# Patient Record
Sex: FEMALE | Race: WHITE | NOT HISPANIC OR LATINO | Employment: FULL TIME | URBAN - METROPOLITAN AREA
[De-identification: names, ages, dates, MRNs, and addresses within clinical notes are randomized per-mention and may not be internally consistent; named-entity substitution may affect disease eponyms.]

---

## 2023-04-15 DIAGNOSIS — Z00.00 ENCOUNTER FOR GENERAL ADULT MEDICAL EXAMINATION WITHOUT ABNORMAL FINDINGS: ICD-10-CM

## 2023-04-16 RX ORDER — PROPRANOLOL HYDROCHLORIDE 80 MG/1
CAPSULE, EXTENDED RELEASE ORAL
Qty: 90 CAPSULE | Refills: 0 | Status: SHIPPED | OUTPATIENT
Start: 2023-04-16 | End: 2023-08-21

## 2023-06-20 ENCOUNTER — OFFICE VISIT (OUTPATIENT)
Dept: PRIMARY CARE | Facility: CLINIC | Age: 58
End: 2023-06-20
Payer: COMMERCIAL

## 2023-06-20 VITALS
WEIGHT: 202 LBS | DIASTOLIC BLOOD PRESSURE: 82 MMHG | BODY MASS INDEX: 35.79 KG/M2 | HEART RATE: 84 BPM | HEIGHT: 63 IN | SYSTOLIC BLOOD PRESSURE: 139 MMHG

## 2023-06-20 DIAGNOSIS — E04.2 MULTINODULAR GOITER: ICD-10-CM

## 2023-06-20 DIAGNOSIS — Z00.00 HEALTH CARE MAINTENANCE: Primary | ICD-10-CM

## 2023-06-20 DIAGNOSIS — H90.3 BILATERAL SENSORINEURAL HEARING LOSS: ICD-10-CM

## 2023-06-20 DIAGNOSIS — E05.90 HYPERTHYROIDISM: ICD-10-CM

## 2023-06-20 DIAGNOSIS — K21.9 CHRONIC GERD: ICD-10-CM

## 2023-06-20 DIAGNOSIS — I10 PRIMARY HYPERTENSION: ICD-10-CM

## 2023-06-20 PROBLEM — D64.9 ANEMIA: Status: ACTIVE | Noted: 2023-06-20

## 2023-06-20 PROBLEM — F43.20 ADULT SITUATIONAL STRESS DISORDER: Status: ACTIVE | Noted: 2023-06-20

## 2023-06-20 PROBLEM — K80.20 CHOLELITHIASIS: Status: ACTIVE | Noted: 2023-06-20

## 2023-06-20 PROCEDURE — 1036F TOBACCO NON-USER: CPT | Performed by: INTERNAL MEDICINE

## 2023-06-20 PROCEDURE — 99396 PREV VISIT EST AGE 40-64: CPT | Performed by: INTERNAL MEDICINE

## 2023-06-20 PROCEDURE — 3079F DIAST BP 80-89 MM HG: CPT | Performed by: INTERNAL MEDICINE

## 2023-06-20 PROCEDURE — 3075F SYST BP GE 130 - 139MM HG: CPT | Performed by: INTERNAL MEDICINE

## 2023-06-20 RX ORDER — MESALAMINE 1.2 G/1
4 TABLET, DELAYED RELEASE ORAL
COMMUNITY
Start: 2022-05-18 | End: 2024-02-26 | Stop reason: SDUPTHER

## 2023-06-20 RX ORDER — AMOXICILLIN 500 MG/1
2000 TABLET, FILM COATED ORAL DAILY
COMMUNITY
Start: 2022-04-04

## 2023-06-20 ASSESSMENT — PROMIS GLOBAL HEALTH SCALE
RATE_AVERAGE_FATIGUE: MILD
CARRYOUT_PHYSICAL_ACTIVITIES: COMPLETELY
RATE_GENERAL_HEALTH: GOOD
RATE_AVERAGE_PAIN: 3
RATE_QUALITY_OF_LIFE: VERY GOOD
RATE_PHYSICAL_HEALTH: GOOD
EMOTIONAL_PROBLEMS: SOMETIMES
RATE_SOCIAL_SATISFACTION: GOOD
RATE_MENTAL_HEALTH: VERY GOOD
CARRYOUT_SOCIAL_ACTIVITIES: VERY GOOD

## 2023-06-20 ASSESSMENT — PATIENT HEALTH QUESTIONNAIRE - PHQ9
2. FEELING DOWN, DEPRESSED OR HOPELESS: NOT AT ALL
SUM OF ALL RESPONSES TO PHQ9 QUESTIONS 1 AND 2: 0
1. LITTLE INTEREST OR PLEASURE IN DOING THINGS: NOT AT ALL

## 2023-06-20 NOTE — ASSESSMENT & PLAN NOTE
Check screening labs.  Mammogram ordered.  Check ultrasound of the thyroid.  Check TSH.  Encouraged regular exercise well-balanced diet.  Encouraged weight reduction.

## 2023-06-20 NOTE — PROGRESS NOTES
"Subjective   Patient ID: Chelita Quiroz is a 58 y.o. female who presents for Annual Exam.        HPI   Patient is a 58-year-old female with past medical history of chronic GERD hypothyroidism hypertension cholelithiasis and hearing loss who presents for wellness.  She has no particular complaints at this time.  She has seen ENT in the last year but states that she is doing well.  No need for hearing aids.    She did go to GI to discuss her abdominal complaints.  She had a right upper quadrant ultrasound showing cholelithiasis and fatty liver without evidence of cholecystitis.  Patient denies any right upper quadrant abdominal pain when eating fatty foods    She has hypothyroidism in setting of multinodular goiter.  Last ultrasound done a year and a half ago and she is due for repeat ultrasound in 6 months.  Due for TSH recheck.  No heat or cold intolerances.    Patient states that she is up-to-date on her well woman examination and is due for a mammogram later this year    Denies illicit substances or smoking        Review of Systems  Constitutional: No fever or chills, No Night Sweats  Eyes: No Blurry Vision or Eye sight problems  ENT: No Nasal Discharge, Hoarseness, sore throat  Cardiovascular: no chest pain, no palpitations and no syncope.   Respiratory: no cough, no shortness of breath during exertion and no shortness of breath at rest.   Gastrointestinal: no abdominal pain, no nausea and no vomiting.   : No vaginal discharge, burning with urination, no blood in urine or stools  Skin: No Skin rashes or Lesions  Neuro: No Headache, no dizziness or Numbness or tingling  Psych: No Anxiety, depression or sleeping problems  Heme: No Easy bleeding or brusing.     Objective   /82   Pulse 84   Ht 1.6 m (5' 3\")   Wt 91.6 kg (202 lb)   BMI 35.78 kg/m²     Physical Exam  Patient declined Chaperone  Constitutional: Alert and in no acute distress. Well developed, well nourished.   Head and Face: Head and face: " Normal.    Eyes: Normal external exam. Pupils were equal in size, round, reactive to light (PERRL) with normal accommodation and extraocular movements intact (EOMI).   Ears, Nose, Mouth, and Throat: External inspection of ears and nose: Normal.  Hearing: Normal.  Nasal mucosa, septum, and turbinates: Normal.  Lips, teeth, and gums: Normal.  Oropharynx: Normal.   Neck: No neck mass was observed. Supple. Thyroid not enlarged and there were no palpable thyroid nodules.   Cardiovascular: Heart rate and rhythm were normal, normal S1 and S2. Pedal pulses: Normal. No peripheral edema.   Pulmonary: No respiratory distress. Clear bilateral breath sounds.   Breast: Normal Appearance, No Masses or lumps palpated  Abdomen: Soft nontender; no abdominal mass palpated. Normal bowel sounds. No organomegaly.   : Deferred   Musculoskeletal: No joint swelling seen, normal movements of all extremities. Range of motion: Normal.  Muscle strength/tone: Normal.    Skin: Normal skin color and pigmentation, normal skin turgor, and no rash.   Neurologic: Deep tendon reflexes were 2+ and symmetric.   Psychiatric: Judgment and insight: Intact. Mood and affect: Normal.  Lymphatic: No cervical lymphadenopathy. Palpation of lymph nodes in axillae: Normal.  Palpation of lymph nodes in groin: Normal.    Lab Results   Component Value Date    WBC 7.9 09/23/2021    HGB 13.3 09/23/2021    HCT 42.0 09/23/2021     09/23/2021    CHOL 231 (H) 09/23/2021    TRIG 144 09/23/2021    HDL 49.2 09/23/2021    ALT 20 09/23/2021    AST 21 09/23/2021     09/23/2021    K 4.4 09/23/2021     09/23/2021    CREATININE 0.87 09/23/2021    BUN 14 09/23/2021    CO2 30 09/23/2021    TSH 0.70 09/23/2021    HGBA1C 5.4 02/08/2021       US right upper quadrant  Narrative: Interpreted By:  BAILEY HERRERA MD  MRN: [id number]  Patient Name: COLEEN ZELAYA     STUDY:  US RUQ ABDOMEN  6/20/2023 8:00 am     INDICATION:  57 y/o   F with  change in bowel habits   R19.4: Change in bowel  habits K80.20: Cholelithiasis.     COMPARISON:  None.     ACCESSION NUMBER(S):  [id number]     ORDERING CLINICIAN:  SANDIE MOONEY     TECHNIQUE:  Routine ultrasound of the right upper quadrant was performed.  Static  images were obtained for remote interpretation.     FINDINGS:  LIVER:  Craniocaudal length:  17.9 cm,  enlarged  Echogenicity:  Increased  Mass:  None.     BILE DUCTS:  Intrahepatic ducts: Non-dilated.  Common bile duct diameter: 6 mm.     GALLBLADDER:  Gallbladder:  Normal.  Gallstones:  Present  Gallbladder sludge:  None.  Gallbladder wall thickening:  None.  Pericholecystic fluid:  None.     PANCREAS:   Visualized portions are unremarkable.     RIGHT KIDNEY:  Craniocaudal length:  10.4 cm,  within normal limits of size for age.  No hydronephrosis, hydroureter or focal renal lesion.     PERITONEAL FLUID:   None seen in the right upper quadrant.     Impression: Hepatomegaly with diffuse steatosis. No focal lesion.  Cholelithiasis without evidence of cholecystitis.      Assessment/Plan   Problem List Items Addressed This Visit          Circulatory    Hypertension     Blood pressure elevated but deferring increase in medications at this time            Digestive    Chronic GERD     Now off PPI.  Symptoms have improved since starting the PPI.  Continue following gastroenterology            Endocrine/Metabolic    Hyperthyroidism     Check TSH  Repeat ultrasound at the end of the year            Other    Bilateral sensorineural hearing loss     Continue following with ENT         Health care maintenance - Primary     Check screening labs.  Mammogram ordered.  Check ultrasound of the thyroid.  Check TSH.  Encouraged regular exercise well-balanced diet.  Encouraged weight reduction.         Relevant Orders    BI mammo bilateral screening tomosynthesis    Comprehensive metabolic panel    CBC    Lipid Panel    TSH with reflex to Free T4 if abnormal     Other Visit Diagnoses        Multinodular goiter        Relevant Orders    US thyroid              Dear Heidi Quiroz     It was my pleasure to take care of you today in the office. Below are the things we discussed today:    1. 1. Immunizations: Yearly Flu shot is recommended.          a: COVID: Up-to-Date         b: Tetanus: Up-to-date         c: Shingrix: Deferring    2. Blood Work: Ordered  3. Seen your dentist twice a year  4. Yearly Eye exam is recommended    5. BMI: 35 8  6: Diet recommendations:   Eat Clean, Try to have as many home cooked meals as possible  Avoid processed foods which contain excess calories, sugar, and sodium.    7. Exercise recommendations:   150 minutes a week to maintain your weight     If you have to loose weight, you need a better diet and exercise plan.     8. Supplements recommended:  a - Calcium 600 mg up to twice a day to get a total of 1200 mg. Each 8 oz of milk or yogurt or 1 oz of cheese, 1 Banana, 1 serving of green Leafy vegetable has about 300 mg of Calcium, so you may subtract that amount. Calcium citrate is the only acceptable supplement to take if you take an acid suppressing medication like Prilosec; otherwise Calcium carbonate is acceptable too (It can cause Constipation).   b - Vitamin D - 2000 IU daily     9. Please get your Living will / Advance directive completed if you do not have one already. Please make sure our office has a copy of the latest one.     10. Colonoscopy: Uptodate,   11. Mammogram: Ordered  12. PAP: Advise follow-up with gynecology  13. DEXA: N/A  14: Skin Check: Please see Dermatology once a year for a Skin Check.     15.    Follow up in one year for a Physical. Please call the office before your Physical to see if you need blood work completed prior to your physical.     Please call me if any questions arise from now until your next visit. I will call you after I am done seeing patients. A Doctor is always available by phone when the office is closed. Please feel free to  call for help with any problem that you feel shouldn't wait until the office re-opens.     Holden Hare, DO

## 2023-07-01 ENCOUNTER — LAB (OUTPATIENT)
Dept: LAB | Facility: LAB | Age: 58
End: 2023-07-01
Payer: COMMERCIAL

## 2023-07-01 DIAGNOSIS — Z00.00 HEALTH CARE MAINTENANCE: ICD-10-CM

## 2023-07-01 LAB
ALANINE AMINOTRANSFERASE (SGPT) (U/L) IN SER/PLAS: 28 U/L (ref 7–45)
ALBUMIN (G/DL) IN SER/PLAS: 4 G/DL (ref 3.4–5)
ALKALINE PHOSPHATASE (U/L) IN SER/PLAS: 100 U/L (ref 33–110)
ANION GAP IN SER/PLAS: 11 MMOL/L (ref 10–20)
ASPARTATE AMINOTRANSFERASE (SGOT) (U/L) IN SER/PLAS: 23 U/L (ref 9–39)
BILIRUBIN TOTAL (MG/DL) IN SER/PLAS: 0.6 MG/DL (ref 0–1.2)
C REACTIVE PROTEIN (MG/L) IN SER/PLAS: 1.75 MG/DL
CALCIUM (MG/DL) IN SER/PLAS: 9.1 MG/DL (ref 8.6–10.3)
CARBON DIOXIDE, TOTAL (MMOL/L) IN SER/PLAS: 29 MMOL/L (ref 21–32)
CHLORIDE (MMOL/L) IN SER/PLAS: 102 MMOL/L (ref 98–107)
CHOLESTEROL (MG/DL) IN SER/PLAS: 227 MG/DL (ref 0–199)
CHOLESTEROL IN HDL (MG/DL) IN SER/PLAS: 40.1 MG/DL
CHOLESTEROL/HDL RATIO: 5.7
CREATININE (MG/DL) IN SER/PLAS: 0.81 MG/DL (ref 0.5–1.05)
ERYTHROCYTE DISTRIBUTION WIDTH (RATIO) BY AUTOMATED COUNT: 13 % (ref 11.5–14.5)
ERYTHROCYTE MEAN CORPUSCULAR HEMOGLOBIN CONCENTRATION (G/DL) BY AUTOMATED: 30.7 G/DL (ref 32–36)
ERYTHROCYTE MEAN CORPUSCULAR VOLUME (FL) BY AUTOMATED COUNT: 87 FL (ref 80–100)
ERYTHROCYTES (10*6/UL) IN BLOOD BY AUTOMATED COUNT: 5.3 X10E12/L (ref 4–5.2)
GFR FEMALE: 84 ML/MIN/1.73M2
GLUCOSE (MG/DL) IN SER/PLAS: 85 MG/DL (ref 74–99)
HEMATOCRIT (%) IN BLOOD BY AUTOMATED COUNT: 46 % (ref 36–46)
HEMOGLOBIN (G/DL) IN BLOOD: 14.1 G/DL (ref 12–16)
LDL: 143 MG/DL (ref 0–99)
LEUKOCYTES (10*3/UL) IN BLOOD BY AUTOMATED COUNT: 7.1 X10E9/L (ref 4.4–11.3)
NON HDL CHOLESTEROL: 187 MG/DL
PLATELETS (10*3/UL) IN BLOOD AUTOMATED COUNT: 238 X10E9/L (ref 150–450)
POTASSIUM (MMOL/L) IN SER/PLAS: 4 MMOL/L (ref 3.5–5.3)
PROTEIN TOTAL: 6.7 G/DL (ref 6.4–8.2)
SODIUM (MMOL/L) IN SER/PLAS: 138 MMOL/L (ref 136–145)
THYROTROPIN (MIU/L) IN SER/PLAS BY DETECTION LIMIT <= 0.05 MIU/L: 0.76 MIU/L (ref 0.44–3.98)
TRIGLYCERIDE (MG/DL) IN SER/PLAS: 220 MG/DL (ref 0–149)
UREA NITROGEN (MG/DL) IN SER/PLAS: 13 MG/DL (ref 6–23)
VLDL: 44 MG/DL (ref 0–40)

## 2023-07-01 PROCEDURE — 80053 COMPREHEN METABOLIC PANEL: CPT

## 2023-07-01 PROCEDURE — 80061 LIPID PANEL: CPT

## 2023-07-01 PROCEDURE — 85027 COMPLETE CBC AUTOMATED: CPT

## 2023-07-01 PROCEDURE — 36415 COLL VENOUS BLD VENIPUNCTURE: CPT

## 2023-07-01 PROCEDURE — 84443 ASSAY THYROID STIM HORMONE: CPT

## 2023-07-06 DIAGNOSIS — E78.5 DYSLIPIDEMIA: Primary | ICD-10-CM

## 2023-07-06 RX ORDER — ATORVASTATIN CALCIUM 20 MG/1
20 TABLET, FILM COATED ORAL DAILY
Qty: 30 TABLET | Refills: 11 | Status: SHIPPED | OUTPATIENT
Start: 2023-07-06 | End: 2023-07-30

## 2023-07-06 NOTE — RESULT ENCOUNTER NOTE
We will start statin.  Start atorvastatin 20 mg p.o. daily.  Advised Mediterranean diet.  Follow-up 3 months for repeat lipid

## 2023-07-12 LAB — CALPROTECTIN, STOOL: 38 UG/G

## 2023-07-28 DIAGNOSIS — E78.5 DYSLIPIDEMIA: ICD-10-CM

## 2023-07-30 RX ORDER — ATORVASTATIN CALCIUM 20 MG/1
20 TABLET, FILM COATED ORAL DAILY
Qty: 30 TABLET | Refills: 11 | Status: SHIPPED | OUTPATIENT
Start: 2023-07-30 | End: 2023-10-31

## 2023-08-02 DIAGNOSIS — I10 PRIMARY HYPERTENSION: Primary | ICD-10-CM

## 2023-08-02 RX ORDER — ROSUVASTATIN CALCIUM 10 MG/1
10 TABLET, COATED ORAL DAILY
Qty: 30 TABLET | Refills: 5 | Status: SHIPPED | OUTPATIENT
Start: 2023-08-02 | End: 2023-08-24

## 2023-08-16 ENCOUNTER — OFFICE VISIT (OUTPATIENT)
Dept: PRIMARY CARE | Facility: CLINIC | Age: 58
End: 2023-08-16
Payer: COMMERCIAL

## 2023-08-16 VITALS
HEART RATE: 62 BPM | DIASTOLIC BLOOD PRESSURE: 83 MMHG | SYSTOLIC BLOOD PRESSURE: 134 MMHG | WEIGHT: 202 LBS | BODY MASS INDEX: 35.78 KG/M2

## 2023-08-16 DIAGNOSIS — M54.9 BACK PAIN, UNSPECIFIED BACK LOCATION, UNSPECIFIED BACK PAIN LATERALITY, UNSPECIFIED CHRONICITY: ICD-10-CM

## 2023-08-16 DIAGNOSIS — Z00.00 ENCOUNTER FOR GENERAL ADULT MEDICAL EXAMINATION WITHOUT ABNORMAL FINDINGS: ICD-10-CM

## 2023-08-16 DIAGNOSIS — E05.90 HYPERTHYROIDISM: Primary | ICD-10-CM

## 2023-08-16 DIAGNOSIS — I10 PRIMARY HYPERTENSION: ICD-10-CM

## 2023-08-16 PROCEDURE — 1036F TOBACCO NON-USER: CPT | Performed by: INTERNAL MEDICINE

## 2023-08-16 PROCEDURE — 3079F DIAST BP 80-89 MM HG: CPT | Performed by: INTERNAL MEDICINE

## 2023-08-16 PROCEDURE — 3075F SYST BP GE 130 - 139MM HG: CPT | Performed by: INTERNAL MEDICINE

## 2023-08-16 PROCEDURE — 99214 OFFICE O/P EST MOD 30 MIN: CPT | Performed by: INTERNAL MEDICINE

## 2023-08-16 NOTE — PROGRESS NOTES
Subjective   Patient ID: Chelita Quiroz is a 58 y.o. female who presents for Follow-up.        HPI   Patient is a 58-year-old female with past medical history of chronic GERD hypothyroidism hypertension cholelithiasis and hearing loss who presents for follow up.          She did go to GI to discuss her abdominal complaints.  She had a right upper quadrant ultrasound showing cholelithiasis and fatty liver without evidence of cholecystitis.  Patient denies any right upper quadrant abdominal pain when eating fatty foods.  She does have inflammatory bowel disease and follows with gastroenterology regularly.  Her CRP was slightly elevated and the calprotectin was normal.  Patient is worried about the elevated CRP as she states gastroenterology does not feel that is related to her IBD    She has hypothyroidism in setting of multinodular goiter.  Last ultrasound done a year and a half ago and she is due for repeat ultrasound in 6 months.  TSH within normal range since he has follow-up with ENT towards the end of the year.          Review of Systems  Constitutional: No fever or chills, No Night Sweats  Eyes: No Blurry Vision or Eye sight problems  ENT: No Nasal Discharge, Hoarseness, sore throat  Cardiovascular: no chest pain, no palpitations and no syncope.   Respiratory: no cough, no shortness of breath during exertion and no shortness of breath at rest.   Gastrointestinal: no abdominal pain, no nausea and no vomiting.   : No vaginal discharge, burning with urination, no blood in urine or stools  Skin: No Skin rashes or Lesions  Neuro: No Headache, no dizziness or Numbness or tingling  Psych: No Anxiety, depression or sleeping problems  Heme: No Easy bleeding or brusing.     Objective   /83   Pulse 62   Wt 91.6 kg (202 lb)   BMI 35.78 kg/m²     Physical Exam  Patient declined Chaperone  Constitutional: Alert and in no acute distress. Well developed, well nourished.   Head and Face: Head and face: Normal.     Eyes: Normal external exam. Pupils were equal in size, round, reactive to light (PERRL) with normal accommodation and extraocular movements intact (EOMI).   Ears, Nose, Mouth, and Throat: External inspection of ears and nose: Normal.  Hearing: Normal.  Nasal mucosa, septum, and turbinates: Normal.  Lips, teeth, and gums: Normal.  Oropharynx: Normal.   Neck: No neck mass was observed. Supple. Thyroid not enlarged and there were no palpable thyroid nodules.   Cardiovascular: Heart rate and rhythm were normal, normal S1 and S2. Pedal pulses: Normal. No peripheral edema.   Pulmonary: No respiratory distress. Clear bilateral breath sounds.   Breast: Normal Appearance, No Masses or lumps palpated  Abdomen: Soft nontender; no abdominal mass palpated. Normal bowel sounds. No organomegaly.   : Deferred   Musculoskeletal: No joint swelling seen, normal movements of all extremities. Range of motion: Normal.  Muscle strength/tone: Normal.    Skin: Normal skin color and pigmentation, normal skin turgor, and no rash.   Neurologic: Deep tendon reflexes were 2+ and symmetric.   Psychiatric: Judgment and insight: Intact. Mood and affect: Normal.  Lymphatic: No cervical lymphadenopathy. Palpation of lymph nodes in axillae: Normal.  Palpation of lymph nodes in groin: Normal.    Lab Results   Component Value Date    WBC 7.1 07/01/2023    HGB 14.1 07/01/2023    HCT 46.0 07/01/2023     07/01/2023    CHOL 227 (H) 07/01/2023    TRIG 220 (H) 07/01/2023    HDL 40.1 07/01/2023    ALT 28 07/01/2023    AST 23 07/01/2023     07/01/2023    K 4.0 07/01/2023     07/01/2023    CREATININE 0.81 07/01/2023    BUN 13 07/01/2023    CO2 29 07/01/2023    TSH 0.76 07/01/2023    HGBA1C 5.4 02/08/2021       US right upper quadrant  Narrative: Interpreted By:  BAILEY HERRERA MD  MRN: [id number]  Patient Name: COLEEN ZELAYA     STUDY:  US RUQ ABDOMEN  6/20/2023 8:00 am     INDICATION:  57 y/o   F with  change in bowel habits  R19.4:  Change in bowel  habits K80.20: Cholelithiasis.     COMPARISON:  None.     ACCESSION NUMBER(S):  [id number]     ORDERING CLINICIAN:  SANDIE MOONEY     TECHNIQUE:  Routine ultrasound of the right upper quadrant was performed.  Static  images were obtained for remote interpretation.     FINDINGS:  LIVER:  Craniocaudal length:  17.9 cm,  enlarged  Echogenicity:  Increased  Mass:  None.     BILE DUCTS:  Intrahepatic ducts: Non-dilated.  Common bile duct diameter: 6 mm.     GALLBLADDER:  Gallbladder:  Normal.  Gallstones:  Present  Gallbladder sludge:  None.  Gallbladder wall thickening:  None.  Pericholecystic fluid:  None.     PANCREAS:   Visualized portions are unremarkable.     RIGHT KIDNEY:  Craniocaudal length:  10.4 cm,  within normal limits of size for age.  No hydronephrosis, hydroureter or focal renal lesion.     PERITONEAL FLUID:   None seen in the right upper quadrant.     Impression: Hepatomegaly with diffuse steatosis. No focal lesion.  Cholelithiasis without evidence of cholecystitis.      Assessment/Plan   Problem List Items Addressed This Visit          Cardiac and Vasculature    Hypertension    Relevant Orders    C-reactive protein    CK       Endocrine/Metabolic    Hyperthyroidism - Primary    Relevant Orders    C-reactive protein    CK     Other Visit Diagnoses       Back pain, unspecified back location, unspecified back pain laterality, unspecified chronicity        Relevant Orders    Referral to Physical Therapy          With regards to the lower back pain considering the chronicity of the pain will refer to physical therapy.  Can take Tylenol as needed  Recommend stretching and weight reduction    Cholelithiasis without cholecystitis  Encourage Mediterranean diet and weight reduction explained to the patient she should develop unremitting right upper quadrant abdominal pain she may need to go to the emergency room for  Removal of the gallbladder    IBD  Continue following with gastroenterology.   Check CRP as well as CPK  We will call with results of testing    Multinodular goiter  Continue following with ENT    Follow-up 6 months

## 2023-08-19 PROBLEM — E66.812 CLASS 2 SEVERE OBESITY WITH SERIOUS COMORBIDITY AND BODY MASS INDEX (BMI) OF 36.0 TO 36.9 IN ADULT: Status: ACTIVE | Noted: 2023-08-19

## 2023-08-19 PROBLEM — R10.13 DYSPEPSIA: Status: ACTIVE | Noted: 2023-08-19

## 2023-08-19 PROBLEM — Z78.0 MENOPAUSE: Status: ACTIVE | Noted: 2023-08-19

## 2023-08-19 PROBLEM — M25.512 LEFT SHOULDER PAIN: Status: ACTIVE | Noted: 2023-08-19

## 2023-08-19 PROBLEM — E66.812 OBESITY, CLASS II, BMI 35-39.9: Status: ACTIVE | Noted: 2023-08-19

## 2023-08-19 PROBLEM — R06.02 SHORTNESS OF BREATH ON EXERTION: Status: ACTIVE | Noted: 2023-08-19

## 2023-08-19 PROBLEM — M54.32 SCIATICA OF LEFT SIDE: Status: ACTIVE | Noted: 2023-08-19

## 2023-08-19 PROBLEM — Z87.09 HISTORY OF ALLERGIC RHINITIS: Status: ACTIVE | Noted: 2023-08-19

## 2023-08-19 PROBLEM — K51.90 ULCERATIVE COLITIS (MULTI): Status: ACTIVE | Noted: 2023-08-19

## 2023-08-19 PROBLEM — J31.0 RHINITIS: Status: ACTIVE | Noted: 2023-08-19

## 2023-08-19 PROBLEM — H93.13 TINNITUS OF BOTH EARS: Status: ACTIVE | Noted: 2023-08-19

## 2023-08-19 PROBLEM — G47.00 INSOMNIA: Status: ACTIVE | Noted: 2023-08-19

## 2023-08-19 PROBLEM — R23.3 EASY BRUISING: Status: ACTIVE | Noted: 2023-08-19

## 2023-08-19 PROBLEM — G47.9 SLEEP DISTURBANCES: Status: ACTIVE | Noted: 2023-08-19

## 2023-08-19 PROBLEM — R79.89 D-DIMER, ELEVATED: Status: ACTIVE | Noted: 2023-08-19

## 2023-08-19 PROBLEM — E66.9 OBESITY (BMI 30.0-34.9): Status: ACTIVE | Noted: 2023-08-19

## 2023-08-19 PROBLEM — M62.838 MUSCLE SPASM: Status: ACTIVE | Noted: 2023-08-19

## 2023-08-19 PROBLEM — M16.12 OSTEOARTHRITIS OF LEFT HIP: Status: ACTIVE | Noted: 2023-08-19

## 2023-08-19 PROBLEM — M43.00 PARS DEFECT: Status: ACTIVE | Noted: 2023-08-19

## 2023-08-19 PROBLEM — E55.9 VITAMIN D DEFICIENCY: Status: ACTIVE | Noted: 2023-08-19

## 2023-08-19 PROBLEM — R31.9 BLOOD IN URINE: Status: ACTIVE | Noted: 2023-08-19

## 2023-08-19 PROBLEM — R10.32 LLQ DISCOMFORT: Status: ACTIVE | Noted: 2023-08-19

## 2023-08-19 PROBLEM — R19.4 CHANGE IN BOWEL HABITS: Status: ACTIVE | Noted: 2023-08-19

## 2023-08-19 PROBLEM — R51.9 HEADACHE: Status: ACTIVE | Noted: 2023-08-19

## 2023-08-19 PROBLEM — R07.89 ATYPICAL CHEST PAIN: Status: ACTIVE | Noted: 2023-08-19

## 2023-08-19 PROBLEM — M25.559 HIP PAIN: Status: ACTIVE | Noted: 2023-08-19

## 2023-08-19 PROBLEM — R42 DIZZINESS: Status: ACTIVE | Noted: 2023-08-19

## 2023-08-19 PROBLEM — L65.9 HAIR THINNING: Status: ACTIVE | Noted: 2023-08-19

## 2023-08-19 PROBLEM — R93.89 ABNORMAL CHEST CT: Status: ACTIVE | Noted: 2023-08-19

## 2023-08-19 PROBLEM — R91.1 LUNG NODULE: Status: ACTIVE | Noted: 2023-08-19

## 2023-08-19 PROBLEM — E78.00 PURE HYPERCHOLESTEROLEMIA: Status: ACTIVE | Noted: 2023-08-19

## 2023-08-19 PROBLEM — M25.569 KNEE PAIN: Status: ACTIVE | Noted: 2023-08-19

## 2023-08-19 PROBLEM — M89.8X8 MASS OF STERNUM: Status: ACTIVE | Noted: 2023-08-19

## 2023-08-19 PROBLEM — E66.9 OBESITY, CLASS II, BMI 35-39.9: Status: ACTIVE | Noted: 2023-08-19

## 2023-08-19 PROBLEM — E04.2 MULTINODULAR THYROID: Status: ACTIVE | Noted: 2023-08-19

## 2023-08-19 PROBLEM — N76.0 VAGINITIS: Status: ACTIVE | Noted: 2023-08-19

## 2023-08-19 PROBLEM — Z79.1 NSAID LONG-TERM USE: Status: ACTIVE | Noted: 2023-08-19

## 2023-08-19 PROBLEM — E66.811 OBESITY (BMI 30.0-34.9): Status: ACTIVE | Noted: 2023-08-19

## 2023-08-19 PROBLEM — S42.252A CLOSED DISPLACED FRACTURE OF GREATER TUBEROSITY OF LEFT HUMERUS: Status: ACTIVE | Noted: 2023-08-19

## 2023-08-19 PROBLEM — M25.561 RIGHT KNEE PAIN: Status: ACTIVE | Noted: 2023-08-19

## 2023-08-19 PROBLEM — E66.01 CLASS 2 SEVERE OBESITY WITH SERIOUS COMORBIDITY AND BODY MASS INDEX (BMI) OF 36.0 TO 36.9 IN ADULT (MULTI): Status: ACTIVE | Noted: 2023-08-19

## 2023-08-19 PROBLEM — K52.3 COLITIS, INDETERMINATE: Status: ACTIVE | Noted: 2023-08-19

## 2023-08-19 PROBLEM — E66.3 OVERWEIGHT: Status: ACTIVE | Noted: 2023-08-19

## 2023-08-19 PROBLEM — R10.9 ABDOMINAL PAIN: Status: ACTIVE | Noted: 2023-08-19

## 2023-08-19 RX ORDER — AMLODIPINE BESYLATE 5 MG/1
5 TABLET ORAL DAILY
COMMUNITY
Start: 2023-08-02 | End: 2023-10-31

## 2023-08-21 DIAGNOSIS — Z00.00 ENCOUNTER FOR GENERAL ADULT MEDICAL EXAMINATION WITHOUT ABNORMAL FINDINGS: ICD-10-CM

## 2023-08-21 RX ORDER — PROPRANOLOL HYDROCHLORIDE 80 MG/1
80 CAPSULE, EXTENDED RELEASE ORAL DAILY
Qty: 90 CAPSULE | Refills: 1 | Status: SHIPPED | OUTPATIENT
Start: 2023-08-21 | End: 2024-04-17

## 2023-08-21 RX ORDER — PROPRANOLOL HYDROCHLORIDE 80 MG/1
CAPSULE, EXTENDED RELEASE ORAL
Qty: 90 CAPSULE | Refills: 0 | Status: SHIPPED | OUTPATIENT
Start: 2023-08-21 | End: 2023-08-21 | Stop reason: SDUPTHER

## 2023-08-24 DIAGNOSIS — I10 PRIMARY HYPERTENSION: ICD-10-CM

## 2023-08-24 RX ORDER — ROSUVASTATIN CALCIUM 10 MG/1
10 TABLET, COATED ORAL DAILY
Qty: 30 TABLET | Refills: 5 | Status: SHIPPED | OUTPATIENT
Start: 2023-08-24 | End: 2024-01-22

## 2023-09-07 ENCOUNTER — LAB (OUTPATIENT)
Dept: LAB | Facility: LAB | Age: 58
End: 2023-09-07
Payer: COMMERCIAL

## 2023-09-07 DIAGNOSIS — E05.90 HYPERTHYROIDISM: ICD-10-CM

## 2023-09-07 DIAGNOSIS — I10 PRIMARY HYPERTENSION: ICD-10-CM

## 2023-09-07 LAB
ALANINE AMINOTRANSFERASE (SGPT) (U/L) IN SER/PLAS: NORMAL
ALBUMIN (G/DL) IN SER/PLAS: NORMAL
ALKALINE PHOSPHATASE (U/L) IN SER/PLAS: NORMAL
ANION GAP IN SER/PLAS: NORMAL
ASPARTATE AMINOTRANSFERASE (SGOT) (U/L) IN SER/PLAS: NORMAL
BILIRUBIN TOTAL (MG/DL) IN SER/PLAS: NORMAL
C REACTIVE PROTEIN (MG/L) IN SER/PLAS: 1.02 MG/DL
CALCIUM (MG/DL) IN SER/PLAS: NORMAL
CARBON DIOXIDE, TOTAL (MMOL/L) IN SER/PLAS: NORMAL
CHLORIDE (MMOL/L) IN SER/PLAS: NORMAL
CREATINE KINASE (U/L) IN SER/PLAS: 107 U/L (ref 0–215)
CREATININE (MG/DL) IN SER/PLAS: NORMAL
ERYTHROCYTE DISTRIBUTION WIDTH (RATIO) BY AUTOMATED COUNT: NORMAL
ERYTHROCYTE MEAN CORPUSCULAR HEMOGLOBIN CONCENTRATION (G/DL) BY AUTOMATED: NORMAL
ERYTHROCYTE MEAN CORPUSCULAR VOLUME (FL) BY AUTOMATED COUNT: NORMAL
ERYTHROCYTES (10*6/UL) IN BLOOD BY AUTOMATED COUNT: NORMAL
GFR FEMALE: NORMAL
GFR MALE: NORMAL
GLUCOSE (MG/DL) IN SER/PLAS: NORMAL
HEMATOCRIT (%) IN BLOOD BY AUTOMATED COUNT: NORMAL
HEMOGLOBIN (G/DL) IN BLOOD: NORMAL
LEUKOCYTES (10*3/UL) IN BLOOD BY AUTOMATED COUNT: NORMAL
NRBC (PER 100 WBCS) BY AUTOMATED COUNT: NORMAL
PLATELETS (10*3/UL) IN BLOOD AUTOMATED COUNT: NORMAL
POTASSIUM (MMOL/L) IN SER/PLAS: NORMAL
PROTEIN TOTAL: NORMAL
SODIUM (MMOL/L) IN SER/PLAS: NORMAL
UREA NITROGEN (MG/DL) IN SER/PLAS: NORMAL

## 2023-09-07 PROCEDURE — 82550 ASSAY OF CK (CPK): CPT

## 2023-09-07 PROCEDURE — 36415 COLL VENOUS BLD VENIPUNCTURE: CPT

## 2023-09-07 PROCEDURE — 86140 C-REACTIVE PROTEIN: CPT

## 2023-10-10 ENCOUNTER — ANCILLARY PROCEDURE (OUTPATIENT)
Dept: RADIOLOGY | Facility: CLINIC | Age: 58
End: 2023-10-10
Payer: COMMERCIAL

## 2023-10-10 DIAGNOSIS — E04.2 NONTOXIC MULTINODULAR GOITER: ICD-10-CM

## 2023-10-10 PROCEDURE — 76536 US EXAM OF HEAD AND NECK: CPT

## 2023-10-10 PROCEDURE — 76536 US EXAM OF HEAD AND NECK: CPT | Performed by: RADIOLOGY

## 2023-10-11 NOTE — RESULT ENCOUNTER NOTE
The thyroid nodule has decreased in size from prior readings but is still TI-RADS 3.  I would recommend following up in 6 months.  Please make an appointment and if it grows then I would recommend biopsy

## 2023-10-23 ENCOUNTER — APPOINTMENT (OUTPATIENT)
Dept: PHYSICAL THERAPY | Facility: CLINIC | Age: 58
End: 2023-10-23
Payer: COMMERCIAL

## 2023-10-30 ENCOUNTER — APPOINTMENT (OUTPATIENT)
Dept: RADIOLOGY | Facility: CLINIC | Age: 58
End: 2023-10-30
Payer: COMMERCIAL

## 2023-10-31 ENCOUNTER — OFFICE VISIT (OUTPATIENT)
Dept: OBSTETRICS AND GYNECOLOGY | Facility: CLINIC | Age: 58
End: 2023-10-31
Payer: COMMERCIAL

## 2023-10-31 VITALS
HEIGHT: 63 IN | SYSTOLIC BLOOD PRESSURE: 140 MMHG | DIASTOLIC BLOOD PRESSURE: 82 MMHG | WEIGHT: 207 LBS | BODY MASS INDEX: 36.68 KG/M2

## 2023-10-31 DIAGNOSIS — Z78.0 MENOPAUSE: Primary | ICD-10-CM

## 2023-10-31 DIAGNOSIS — Z12.31 VISIT FOR SCREENING MAMMOGRAM: ICD-10-CM

## 2023-10-31 PROCEDURE — 3077F SYST BP >= 140 MM HG: CPT | Performed by: OBSTETRICS & GYNECOLOGY

## 2023-10-31 PROCEDURE — 99396 PREV VISIT EST AGE 40-64: CPT | Performed by: OBSTETRICS & GYNECOLOGY

## 2023-10-31 PROCEDURE — 1036F TOBACCO NON-USER: CPT | Performed by: OBSTETRICS & GYNECOLOGY

## 2023-10-31 PROCEDURE — 3079F DIAST BP 80-89 MM HG: CPT | Performed by: OBSTETRICS & GYNECOLOGY

## 2023-10-31 NOTE — PROGRESS NOTES
Heidi Quiroz is a 58 y.o. female who presents with a chief complaint of Annual Exam (Last pap 10/03/2022 wnl neg hpv//Mammogram 7/10/2022 neg//Colonoscopy //Contraception n/a)      SUBJECTIVE  No postmenopausal bleeding or discharge.  She has a recent colonoscopy, some changes in bowel habits but no constipation.  There is no dysuria.  Review of the chart notes ultrasound on her thyroid was done in October 2023 she has follow-up.    Past Medical History:   Diagnosis Date    Abnormal findings on diagnostic imaging of other specified body structures 02/13/2020    Abnormal chest CT    Anemia, unspecified 08/03/2021    Anemia    Chronic rhinitis 10/17/2018    Rhinitis    Cough, unspecified 08/03/2017    Cough    Dizziness and giddiness 07/26/2017    Dizziness    Dorsalgia, unspecified 07/27/2021    Back pain    Dorsalgia, unspecified 03/28/2016    Back ache    Elevated blood-pressure reading, without diagnosis of hypertension 04/23/2015    Elevated blood pressure reading without diagnosis of hypertension    Encounter for screening for malignant neoplasm of colon 02/23/2022    Colon cancer screening    Epigastric pain 08/03/2021    Dyspepsia    Essential (primary) hypertension 05/24/2022    Hypertension    Left lower quadrant pain 04/18/2018    LLQ discomfort    Low back pain, unspecified 07/27/2021    Low back pain syndrome    Low back pain, unspecified 04/21/2016    Low back pain    Nonscarring hair loss, unspecified 10/14/2019    Hair thinning    Nontoxic multinodular goiter 11/29/2021    Multinodular thyroid    Other chest pain 07/26/2017    Atypical chest pain    Other specified disorders of bone, other site 10/11/2016    Mass of sternum    Overweight 10/14/2019    Overweight    Pain in right knee 04/23/2015    Right knee pain    Pain in unspecified hip 03/05/2020    Hip pain    Pain in unspecified knee 03/05/2020    Knee pain    Personal history of other diseases of the respiratory system 12/08/2016    History  of allergic rhinitis    Pure hypercholesterolemia, unspecified 11/15/2013    Low-density-lipoid-type (LDL) hyperlipoproteinemia    Spondylolysis, site unspecified 2016    Pars defect    Spondylolysis, site unspecified 2016    Pars defect    Spontaneous ecchymoses 2017    Easy bruising    Unspecified abdominal pain 2020    Abdominal pain    Vitamin D deficiency, unspecified 2013    Vitamin D deficiency    Vitamin D deficiency, unspecified 2017    Vitamin D deficiency     Past Surgical History:   Procedure Laterality Date    OTHER SURGICAL HISTORY  2020    Colonoscopy    OTHER SURGICAL HISTORY  2020    Hip replacement     Social History     Socioeconomic History    Marital status:      Spouse name: None    Number of children: None    Years of education: None    Highest education level: None   Occupational History    Occupation: Pharmacist   Tobacco Use    Smoking status: Former     Types: Cigarettes     Passive exposure: Past    Smokeless tobacco: Never   Vaping Use    Vaping Use: Never used   Substance and Sexual Activity    Alcohol use: Not Currently    Drug use: Never    Sexual activity: Not Currently   Other Topics Concern    None   Social History Narrative    None     Social Determinants of Health     Financial Resource Strain: Not on file   Food Insecurity: Not on file   Transportation Needs: Not on file   Physical Activity: Not on file   Stress: Not on file   Social Connections: Not on file   Intimate Partner Violence: Not on file   Housing Stability: Not on file     Family History   Problem Relation Name Age of Onset    Heart attack Mother      Lung cancer Mother      Hypertension Father      Liver cancer Father      Hypertension Brother      Other (psoriatic arthritis) Son         OB History    Para Term  AB Living   2 1           SAB IAB Ectopic Multiple Live Births                  # Outcome Date GA Lbr Víctor/2nd Weight Sex Delivery Anes  "PTL Lv   2             1 Para                OBJECTIVE  Allergies   Allergen Reactions    Other Unknown      (Not in a hospital admission)       Review of Systems  Negative except changes in bowels that are monitored with specialist  Physical Exam  General Appearance: awake, alert, oriented, in no acute distress  Constitutional: Alert and in no acute distress. Well developed, well nourished.   Head and Face: Head and face: Normal.    Eyes: Normal external exam - nonicteric sclera, extraocular movements intact (EOMI) and no ptosis.   Neck: No neck asymmetry. Supple. Thyroid not enlarged and there were no palpable thyroid nodules.    Pulmonary: No respiratory distress.   Chest: Breasts: Normal appearance, no nipple discharge and no skin changes. Palpation of breasts and axillae: No palpable mass and no axillary lymphadenopathy.   Abdomen: Soft nontender; no abdominal mass palpated. No organomegaly. No hernias.   Genitourinary: External genitalia: Normal. No inguinal lymphadenopathy. Bartholin's Urethral and Skenes Glands: Normal. Urethra: Normal.  Bladder: Normal on palpation. Vagina: Normal. Cervix: Normal. No pap sent. Uterus: Normal.  Right Adnexa/parametria: Normal.  Left Adnexa/parametria: Normal.  Inspection of Perianal Area: Normal.   Musculoskeletal: No joint swelling seen, normal movements of all extremities.   Skin: Normal skin color and pigmentation, normal skin turgor, and no rash.   Neurologic: Non-focal. Grossly intact.   Psychiatric: Alert and oriented x 3. Affect normal to patient baseline. Mood: Appropriate.  /82   Ht 1.6 m (5' 3\")   Wt 93.9 kg (207 lb)   LMP  (LMP Unknown)   BMI 36.67 kg/m²    Problem List Items Addressed This Visit    None  This is a 50-year-old female with a normal exam.  No Pap smear was sent, her Pap was normal in  and high risk HPV negative.  Her routine mammogram was ordered with tomosynthesis.  We discussed also obtaining a bone density test the last 1 in " 2019 showed osteopenia.  I will see her in 1 year.

## 2023-11-06 ENCOUNTER — APPOINTMENT (OUTPATIENT)
Dept: PHYSICAL THERAPY | Facility: CLINIC | Age: 58
End: 2023-11-06
Payer: COMMERCIAL

## 2023-11-13 ENCOUNTER — APPOINTMENT (OUTPATIENT)
Dept: PHYSICAL THERAPY | Facility: CLINIC | Age: 58
End: 2023-11-13
Payer: COMMERCIAL

## 2023-11-16 ENCOUNTER — APPOINTMENT (OUTPATIENT)
Dept: SURGERY | Facility: CLINIC | Age: 58
End: 2023-11-16
Payer: COMMERCIAL

## 2023-11-16 ENCOUNTER — OFFICE VISIT (OUTPATIENT)
Dept: SURGERY | Facility: CLINIC | Age: 58
End: 2023-11-16
Payer: COMMERCIAL

## 2023-11-16 VITALS
WEIGHT: 200 LBS | SYSTOLIC BLOOD PRESSURE: 142 MMHG | DIASTOLIC BLOOD PRESSURE: 80 MMHG | HEART RATE: 70 BPM | BODY MASS INDEX: 35.43 KG/M2

## 2023-11-16 DIAGNOSIS — E04.2 MULTINODULAR THYROID: Primary | ICD-10-CM

## 2023-11-16 PROCEDURE — 99213 OFFICE O/P EST LOW 20 MIN: CPT | Performed by: SURGERY

## 2023-11-16 PROCEDURE — 3079F DIAST BP 80-89 MM HG: CPT | Performed by: SURGERY

## 2023-11-16 PROCEDURE — 3077F SYST BP >= 140 MM HG: CPT | Performed by: SURGERY

## 2023-11-16 PROCEDURE — 1036F TOBACCO NON-USER: CPT | Performed by: SURGERY

## 2023-11-16 NOTE — PROGRESS NOTES
"Subjective   Patient ID: Heidi Quiroz \"Chelita\" is a 58 y.o. female who presents for follow-up of multinodular thyroid gland.    HPI I saw Mrs. Quiroz back in surgery clinic today.  I been following her since 2018 for multinodular thyroid.  Prior to her appointment today she did undergo a follow-up ultrasound.  This was compared to her ultrasound from 2021.  Radiology remarked on the fact that the right-sided thyroid nodule did increase a bit in size.  This was mostly in thickness and width.  Based on this they suggested either biopsy or ongoing follow-up which I will discuss with the patient today.    She remains clinically and biochemically euthyroid with a TSH of 0.76.    She denies any neck pain no difficulty breathing or swallowing no troubles with her voice.    Only change in her past medical history is that she developed ulcerative colitis.  She is now on medical therapy for that.    Review of Systems    Objective   Physical Exam  Vitals reviewed.   Constitutional:       Appearance: Normal appearance.   Neck:      Comments: Mild palpable fullness of the right thyroid lobe compared to left which is consistent with her ultrasound.  Trachea midline.  Lymphadenopathy:      Cervical: No cervical adenopathy.   Neurological:      Mental Status: She is alert.         Narrative & Impression   Interpreted By:  Jaciel Salas,   STUDY:  US THYROID;  10/10/2023 1:30 pm      INDICATION:  Signs/Symptoms: MNG.      COMPARISON:  Thyroid ultrasound dated 11/01/2021.      ACCESSION NUMBER(S):  [id number]      ORDERING CLINICIAN:  SOPHY VELEZ      TECHNIQUE:  Multiple ultrasonographic images of the thyroid gland were obtained.      FINDINGS:          RIGHT LOBE:  The right lobe of the thyroid measures 6.8 cm x 3.0 cm x 2.9 cm. The  right lobe of the thyroid is mildly heterogeneous in echotexture.      LEFT LOBE:  The left lobe of the thyroid measures 5.2 cm x 2.0 cm x 2.2 cm. The  left lobe of the thyroid is mildly " heterogeneous in echotexture.      ISTHMUS:  Thyroid isthmus measures 5 mm in thickness.      Nodule # 1  Location: Posteroinferior aspect of the right lobe of the thyroid  gland. Size: 2.9 x 2.7 x 3.1 cm. Previously measuring 3.0 x 2.1 x 2.3  cm. Composition: Solid or almost completely solid with trace cystic  components (2). In comparison with 04/11/2018 examination there is  overall decreased cystic components. Echogenicity: Heterogeneous with  hypoechoic components (2) Shape: wider than tall (0)  Margins: Combination of ill-defined and smooth (0)  Echogenic foci: None  Other: None  TIRADS score: TR4 moderately suspicious (4-6)      Nodule # 2  Location: Lateral midpole aspect of the left lobe of the thyroid  gland. Size: 1.2 x 0.9 x 1.0 cm. Previously measuring 1.2 x 0.8 x 1.0  cm. Composition: Spongiform (0)  TIRADS score: TR1 benign (0)      Nodule # 3  Location: Inferior posterior aspect of the left lobe of the thyroid  gland. Size: 1.3 x 0.9 x 1.1 cm. Previously measuring 1.3 x 1.2 x 1.3  cm. Composition: Solid or almost completely solid (2)  Echogenicity: Hypoechoic (2)  Shape: wider than tall (0)  Margins: ill defined (0)  Echogenic foci: None  Other: None  TIRADS score: TR4 moderately suspicious (4-6)      Nodule # 4  Location: Inferior aspect of the left lobe of the thyroid gland.  Size: 1.0 x 0.7 x 0.8 cm. Previously measuring 1.4 x 0.9 x 1.3 cm.  Composition: Mixed cystic and solid (1)  Echogenicity: Heterogeneous with hypoechoic components (2)  Shape: wider than tall (0)  Margins: ill defined (0)  Echogenic foci: None  Other: None  TIRADS score: TR 3 mildly suspicious (3)          CERVICAL LYMPH NODES:  No cervical lymph adenopathy is present.      IMPRESSION:  Stable sonographic appearance of multinodular goiter. Dominant  TI-RADS 4 nodule within the inferior aspect of the right lobe of the  thyroid gland measures up to 3.1 cm in diameter. FNA may be  considered if not previously performed. In  comparison with 2018  examination there is decreased cystic component to this dominant  nodule with otherwise similar appearance. Remaining left thyroid  gland nodules are not appreciably changed dating back to 2018.       Assessment/Plan    Mrs. Quiroz has a known history of multinodular thyroid gland dating back to 2018.  On her most recent ultrasound she did have some increase in the right sided thyroid nodule.  Left-sided nodules all look stable or slightly smaller.  She has no new compressive symptoms remains clinically and biochemically euthyroid.    I reviewed options with her which would include doing ultrasound-guided biopsy of the right-sided thyroid nodule versus ongoing observation with a shorter interval of follow-up.    At this time she said she is not interested in a biopsy.  She would prefer to proceed forward with ongoing observation.  I will order a 1 year thyroid ultrasound for her.    I did also instruct her if she notices increasing pain pressure difficulty breathing swallowing or changes in her voice that we can explain for another reason to contact us sooner and we could do the ultrasound sooner.  In addition, if she were to change her mind and decided to move forward with a biopsy she could contact my office and we could set that up for her.

## 2023-12-05 ENCOUNTER — ANCILLARY PROCEDURE (OUTPATIENT)
Dept: RADIOLOGY | Facility: CLINIC | Age: 58
End: 2023-12-05
Payer: COMMERCIAL

## 2023-12-05 VITALS — BODY MASS INDEX: 35.43 KG/M2 | WEIGHT: 199.96 LBS | HEIGHT: 63 IN

## 2023-12-05 DIAGNOSIS — Z12.31 VISIT FOR SCREENING MAMMOGRAM: ICD-10-CM

## 2023-12-05 PROCEDURE — 77063 BREAST TOMOSYNTHESIS BI: CPT

## 2023-12-05 PROCEDURE — 77063 BREAST TOMOSYNTHESIS BI: CPT | Performed by: RADIOLOGY

## 2023-12-05 PROCEDURE — 77067 SCR MAMMO BI INCL CAD: CPT | Performed by: RADIOLOGY

## 2023-12-07 DIAGNOSIS — R92.8 ABNORMALITY OF LEFT BREAST ON SCREENING MAMMOGRAM: Primary | ICD-10-CM

## 2023-12-08 NOTE — RESULT ENCOUNTER NOTE
Your mammogram showed left breast asymmetry and calcifications.  The right breast is normal.  They want to do a diagnostic image of the left breast.  It would be another mammogram, looking at the area more closely.  They will also do a breast ultrasound.  These were ordered in the EMR.  You can call to schedule the diagnostic testing at [phone].    They will be able to read the images while you are there and you will know before you leave if you need any further follow-up.

## 2023-12-14 ENCOUNTER — ANCILLARY PROCEDURE (OUTPATIENT)
Dept: RADIOLOGY | Facility: CLINIC | Age: 58
End: 2023-12-14
Payer: COMMERCIAL

## 2023-12-14 DIAGNOSIS — R92.8 ABNORMALITY OF LEFT BREAST ON SCREENING MAMMOGRAM: ICD-10-CM

## 2023-12-14 PROCEDURE — 77065 DX MAMMO INCL CAD UNI: CPT | Mod: LEFT SIDE | Performed by: RADIOLOGY

## 2023-12-14 PROCEDURE — 77061 BREAST TOMOSYNTHESIS UNI: CPT | Mod: LEFT SIDE | Performed by: RADIOLOGY

## 2023-12-14 PROCEDURE — 77061 BREAST TOMOSYNTHESIS UNI: CPT | Mod: LT

## 2023-12-14 PROCEDURE — 76642 ULTRASOUND BREAST LIMITED: CPT | Mod: LEFT SIDE | Performed by: RADIOLOGY

## 2023-12-14 PROCEDURE — 76642 ULTRASOUND BREAST LIMITED: CPT | Mod: LT

## 2023-12-14 PROCEDURE — 76982 USE 1ST TARGET LESION: CPT | Mod: LT

## 2023-12-22 ENCOUNTER — APPOINTMENT (OUTPATIENT)
Dept: RADIOLOGY | Facility: CLINIC | Age: 58
End: 2023-12-22
Payer: COMMERCIAL

## 2023-12-22 DIAGNOSIS — I10 PRIMARY HYPERTENSION: Primary | ICD-10-CM

## 2023-12-22 RX ORDER — HYDRALAZINE HYDROCHLORIDE 50 MG/1
50 TABLET, FILM COATED ORAL 2 TIMES DAILY
Qty: 60 TABLET | Refills: 2 | Status: SHIPPED | OUTPATIENT
Start: 2023-12-22 | End: 2024-01-02 | Stop reason: SDUPTHER

## 2024-01-02 ENCOUNTER — OFFICE VISIT (OUTPATIENT)
Dept: PRIMARY CARE | Facility: CLINIC | Age: 59
End: 2024-01-02
Payer: COMMERCIAL

## 2024-01-02 VITALS — SYSTOLIC BLOOD PRESSURE: 124 MMHG | HEART RATE: 62 BPM | DIASTOLIC BLOOD PRESSURE: 75 MMHG | OXYGEN SATURATION: 96 %

## 2024-01-02 DIAGNOSIS — E05.90 HYPERTHYROIDISM: ICD-10-CM

## 2024-01-02 DIAGNOSIS — E78.00 PURE HYPERCHOLESTEROLEMIA: ICD-10-CM

## 2024-01-02 DIAGNOSIS — E66.9 OBESITY, CLASS II, BMI 35-39.9: Primary | ICD-10-CM

## 2024-01-02 DIAGNOSIS — I10 PRIMARY HYPERTENSION: ICD-10-CM

## 2024-01-02 DIAGNOSIS — K51.919 ULCERATIVE COLITIS WITH COMPLICATION, UNSPECIFIED LOCATION (MULTI): ICD-10-CM

## 2024-01-02 PROBLEM — R31.9 BLOOD IN URINE: Status: RESOLVED | Noted: 2023-08-19 | Resolved: 2024-01-02

## 2024-01-02 PROBLEM — E66.01 CLASS 2 SEVERE OBESITY WITH SERIOUS COMORBIDITY AND BODY MASS INDEX (BMI) OF 36.0 TO 36.9 IN ADULT (MULTI): Status: RESOLVED | Noted: 2023-08-19 | Resolved: 2024-01-02

## 2024-01-02 PROBLEM — J31.0 RHINITIS: Status: RESOLVED | Noted: 2023-08-19 | Resolved: 2024-01-02

## 2024-01-02 PROBLEM — R19.4 CHANGE IN BOWEL HABITS: Status: RESOLVED | Noted: 2023-08-19 | Resolved: 2024-01-02

## 2024-01-02 PROBLEM — M25.559 HIP PAIN: Status: RESOLVED | Noted: 2023-08-19 | Resolved: 2024-01-02

## 2024-01-02 PROBLEM — M16.12 OSTEOARTHRITIS OF LEFT HIP: Status: RESOLVED | Noted: 2023-08-19 | Resolved: 2024-01-02

## 2024-01-02 PROBLEM — N76.0 VAGINITIS: Status: RESOLVED | Noted: 2023-08-19 | Resolved: 2024-01-02

## 2024-01-02 PROBLEM — Z00.00 HEALTH CARE MAINTENANCE: Status: RESOLVED | Noted: 2023-06-20 | Resolved: 2024-01-02

## 2024-01-02 PROBLEM — M62.838 MUSCLE SPASM: Status: RESOLVED | Noted: 2023-08-19 | Resolved: 2024-01-02

## 2024-01-02 PROBLEM — E66.2 CLASS 2 OBESITY WITH ALVEOLAR HYPOVENTILATION AND BODY MASS INDEX (BMI) OF 35.0 TO 35.9 IN ADULT (MULTI): Status: ACTIVE | Noted: 2023-08-19

## 2024-01-02 PROBLEM — R42 DIZZINESS: Status: RESOLVED | Noted: 2023-08-19 | Resolved: 2024-01-02

## 2024-01-02 PROBLEM — Z79.1 NSAID LONG-TERM USE: Status: RESOLVED | Noted: 2023-08-19 | Resolved: 2024-01-02

## 2024-01-02 PROBLEM — Z87.09 HISTORY OF ALLERGIC RHINITIS: Status: RESOLVED | Noted: 2023-08-19 | Resolved: 2024-01-02

## 2024-01-02 PROBLEM — E66.812 CLASS 2 SEVERE OBESITY WITH SERIOUS COMORBIDITY AND BODY MASS INDEX (BMI) OF 36.0 TO 36.9 IN ADULT: Status: RESOLVED | Noted: 2023-08-19 | Resolved: 2024-01-02

## 2024-01-02 PROBLEM — R07.89 ATYPICAL CHEST PAIN: Status: RESOLVED | Noted: 2023-08-19 | Resolved: 2024-01-02

## 2024-01-02 PROBLEM — R23.3 EASY BRUISING: Status: RESOLVED | Noted: 2023-08-19 | Resolved: 2024-01-02

## 2024-01-02 PROBLEM — E66.812 OBESITY, CLASS II, BMI 35-39.9: Status: RESOLVED | Noted: 2023-08-19 | Resolved: 2024-01-02

## 2024-01-02 PROBLEM — M89.8X8 MASS OF STERNUM: Status: RESOLVED | Noted: 2023-08-19 | Resolved: 2024-01-02

## 2024-01-02 PROBLEM — R10.13 DYSPEPSIA: Status: RESOLVED | Noted: 2023-08-19 | Resolved: 2024-01-02

## 2024-01-02 PROBLEM — H93.13 TINNITUS OF BOTH EARS: Status: RESOLVED | Noted: 2023-08-19 | Resolved: 2024-01-02

## 2024-01-02 PROBLEM — R10.32 LLQ DISCOMFORT: Status: RESOLVED | Noted: 2023-08-19 | Resolved: 2024-01-02

## 2024-01-02 PROBLEM — S42.252A CLOSED DISPLACED FRACTURE OF GREATER TUBEROSITY OF LEFT HUMERUS: Status: RESOLVED | Noted: 2023-08-19 | Resolved: 2024-01-02

## 2024-01-02 PROBLEM — R10.9 ABDOMINAL PAIN: Status: RESOLVED | Noted: 2023-08-19 | Resolved: 2024-01-02

## 2024-01-02 PROBLEM — E66.3 OVERWEIGHT: Status: RESOLVED | Noted: 2023-08-19 | Resolved: 2024-01-02

## 2024-01-02 PROBLEM — R93.89 ABNORMAL CHEST CT: Status: RESOLVED | Noted: 2023-08-19 | Resolved: 2024-01-02

## 2024-01-02 PROBLEM — K80.20 CHOLELITHIASIS: Status: RESOLVED | Noted: 2023-06-20 | Resolved: 2024-01-02

## 2024-01-02 PROBLEM — M25.512 LEFT SHOULDER PAIN: Status: RESOLVED | Noted: 2023-08-19 | Resolved: 2024-01-02

## 2024-01-02 PROBLEM — R79.89 D-DIMER, ELEVATED: Status: RESOLVED | Noted: 2023-08-19 | Resolved: 2024-01-02

## 2024-01-02 PROBLEM — M25.561 RIGHT KNEE PAIN: Status: RESOLVED | Noted: 2023-08-19 | Resolved: 2024-01-02

## 2024-01-02 PROBLEM — M25.569 KNEE PAIN: Status: RESOLVED | Noted: 2023-08-19 | Resolved: 2024-01-02

## 2024-01-02 PROBLEM — G47.9 SLEEP DISTURBANCES: Status: RESOLVED | Noted: 2023-08-19 | Resolved: 2024-01-02

## 2024-01-02 PROBLEM — M43.00 PARS DEFECT: Status: RESOLVED | Noted: 2023-08-19 | Resolved: 2024-01-02

## 2024-01-02 PROBLEM — K21.9 CHRONIC GERD: Status: RESOLVED | Noted: 2023-06-20 | Resolved: 2024-01-02

## 2024-01-02 PROBLEM — R06.02 SHORTNESS OF BREATH ON EXERTION: Status: RESOLVED | Noted: 2023-08-19 | Resolved: 2024-01-02

## 2024-01-02 PROBLEM — Z78.0 MENOPAUSE: Status: RESOLVED | Noted: 2023-08-19 | Resolved: 2024-01-02

## 2024-01-02 PROBLEM — E66.811 OBESITY (BMI 30.0-34.9): Status: RESOLVED | Noted: 2023-08-19 | Resolved: 2024-01-02

## 2024-01-02 PROCEDURE — 99213 OFFICE O/P EST LOW 20 MIN: CPT | Performed by: INTERNAL MEDICINE

## 2024-01-02 PROCEDURE — 3074F SYST BP LT 130 MM HG: CPT | Performed by: INTERNAL MEDICINE

## 2024-01-02 PROCEDURE — 3078F DIAST BP <80 MM HG: CPT | Performed by: INTERNAL MEDICINE

## 2024-01-02 PROCEDURE — 1036F TOBACCO NON-USER: CPT | Performed by: INTERNAL MEDICINE

## 2024-01-02 RX ORDER — HYDRALAZINE HYDROCHLORIDE 50 MG/1
50 TABLET, FILM COATED ORAL 2 TIMES DAILY
Qty: 180 TABLET | Refills: 1 | Status: SHIPPED | OUTPATIENT
Start: 2024-01-02 | End: 2024-02-07 | Stop reason: SINTOL

## 2024-01-02 NOTE — PROGRESS NOTES
Subjective   Patient ID: Chelita Quiroz is a 58 y.o. female who presents for Follow-up.        HPI   Patient is a 58-year-old female with past medical history of chronic GERD hypothyroidism hypertension cholelithiasis and hearing loss who presents for follow up.            She has hypothyroidism in setting of multinodular goiter.  Last ultrasound done a year and a half ago and she is due for repeat ultrasound in 6 months.  TSH within normal range and due for repeat.  She continues to follow-up with ENT    Hypertension currently well-controlled on her medications denies headache changes in vision orthopnea          Review of Systems  Constitutional: No fever or chills, No Night Sweats  Eyes: No Blurry Vision or Eye sight problems  ENT: No Nasal Discharge, Hoarseness, sore throat  Cardiovascular: no chest pain, no palpitations and no syncope.   Respiratory: no cough, no shortness of breath during exertion and no shortness of breath at rest.   Gastrointestinal: no abdominal pain, no nausea and no vomiting.   : No vaginal discharge, burning with urination, no blood in urine or stools  Skin: No Skin rashes or Lesions  Neuro: No Headache, no dizziness or Numbness or tingling  Psych: No Anxiety, depression or sleeping problems  Heme: No Easy bleeding or brusing.     Objective   /75   Pulse 62   LMP  (LMP Unknown)   SpO2 96%     Physical Exam  Patient declined Chaperone  Constitutional: Alert and in no acute distress. Well developed, well nourished.   Head and Face: Head and face: Normal.    Eyes: Normal external exam. Pupils were equal in size, round, reactive to light (PERRL) with normal accommodation and extraocular movements intact (EOMI).   Ears, Nose, Mouth, and Throat: External inspection of ears and nose: Normal.  Hearing: Normal.  Nasal mucosa, septum, and turbinates: Normal.  Lips, teeth, and gums: Normal.  Oropharynx: Normal.   Neck: No neck mass was observed. Supple. Thyroid not enlarged and there were  no palpable thyroid nodules.   Cardiovascular: Heart rate and rhythm were normal, normal S1 and S2. Pedal pulses: Normal. No peripheral edema.   Pulmonary: No respiratory distress. Clear bilateral breath sounds.   Breast: Normal Appearance, No Masses or lumps palpated  Abdomen: Soft nontender; no abdominal mass palpated. Normal bowel sounds. No organomegaly.   : Deferred   Musculoskeletal: No joint swelling seen, normal movements of all extremities. Range of motion: Normal.  Muscle strength/tone: Normal.    Skin: Normal skin color and pigmentation, normal skin turgor, and no rash.   Neurologic: Deep tendon reflexes were 2+ and symmetric.   Psychiatric: Judgment and insight: Intact. Mood and affect: Normal.  Lymphatic: No cervical lymphadenopathy. Palpation of lymph nodes in axillae: Normal.  Palpation of lymph nodes in groin: Normal.    Lab Results   Component Value Date    WBC CANCELED 09/07/2023    HGB CANCELED 09/07/2023    HCT CANCELED 09/07/2023    PLT CANCELED 09/07/2023    CHOL 227 (H) 07/01/2023    TRIG 220 (H) 07/01/2023    HDL 40.1 07/01/2023    ALT CANCELED 09/07/2023    AST CANCELED 09/07/2023    NA CANCELED 09/07/2023    K CANCELED 09/07/2023    CL CANCELED 09/07/2023    CREATININE CANCELED 09/07/2023    BUN CANCELED 09/07/2023    CO2 CANCELED 09/07/2023    TSH 0.76 07/01/2023    HGBA1C 5.4 02/08/2021       BI mammo left diagnostic tomosynthesis, BI US breast limited left  Narrative: Interpreted By:  Jannette Shea,   STUDY:  BI MAMMO LEFT DIAGNOSTIC TOMOSYNTHESIS; BI US BREAST LIMITED LEFT;  12/14/2023 8:37 am; 12/14/2023 9:05 am      ACCESSION NUMBER(S):  [id number]; [id number]      ORDERING CLINICIAN:  RAKEL BRANTLEY      INDICATION:  The patient was recalled from recent screening mammogram 12/05/2023  for left breast calcifications and asymmetry.      COMPARISON:  12/05/2023, 07/19/2022, 03/15/2021 and 08/19/2019..      FINDINGS:  MAMMOGRAPHY: 2D and tomosynthesis images were reviewed at 1 mm  slice  thickness.      Density:  There are areas of scattered fibroglandular tissue.      The spot magnification views demonstrate a group of indeterminate  calcifications in the superolateral left breast at mid depth. The  superior left breast asymmetry resolves into a mixture of fatty and  fibroglandular tissue.      ULTRASOUND:  Targeted sonographic evaluation of the superolateral  left breast at posterior depth was performed using elastography. It  demonstrates an incidental small minimally complicated cyst in the 1  o'clock position 10 cm from the nipple which is avascular and soft on  elastography.      Impression: Indeterminate left breast calcifications. Recommendations are for  surgical consultation and stereotactic biopsy. This was discussed in  great detail with the patient by Dr. Jannette Shea. A pre-procedure form  has been completed.      Incidental benign left breast minimally complicated cyst.      BI-RADS CATEGORY:      BI-RADS Category:  4 Suspicious.  Recommendation:  Biopsy.  Recommended Date:  Immediate.  Laterality:  Left.      For any future breast imaging appointments, please call [phone]).      Method of Detection: Category Sdbt - 3D Screening      MACRO:  Critical Finding:  See findings. Notification was initiated on  12/14/2023 at 9:41 am by  Jannette Shea.  (**-YCF-**) Instructions:  See  Impression for specific recommendations.      Signed by: Jannette Shea 12/14/2023 9:41 AM  Dictation workstation:   [id number]      Assessment/Plan   Problem List Items Addressed This Visit       Hypertension    Relevant Medications    hydrALAZINE (Apresoline) 50 mg tablet    Other Relevant Orders    Follow Up In [facility] - PCP - Health Maintenance    Ulcerative colitis (CMS/HCC)    Relevant Orders    Follow Up In [facility] - PCP - Health Maintenance    RESOLVED: Obesity, Class II, BMI 35-39.9 - Primary    Relevant Orders    Follow Up In [facility] - PCP - Health  Maintenance    Pure hypercholesterolemia    Relevant Orders    Lipid Panel    Follow Up In [facility] - PCP - Health Maintenance     Blood pressure well-controlled on current medications.  No medication adjustments and refills provided.  Check screening labs        IBD  Continue following with gastroenterology.  Check CRP as well as CPK  We will call with results of testing    Multinodular goiter  Continue following with ENT    Follow-up 6 months

## 2024-01-08 ENCOUNTER — TELEMEDICINE (OUTPATIENT)
Dept: PRIMARY CARE | Facility: CLINIC | Age: 59
End: 2024-01-08
Payer: COMMERCIAL

## 2024-01-08 DIAGNOSIS — U07.1 COVID-19: Primary | ICD-10-CM

## 2024-01-08 PROCEDURE — 99213 OFFICE O/P EST LOW 20 MIN: CPT | Performed by: INTERNAL MEDICINE

## 2024-01-08 NOTE — PROGRESS NOTES
"Subjective   Patient ID: Heidi Quiroz \"Chelita\" is a 58 y.o. female who presents for No chief complaint on file..    I discussed with the patient the potential benefits and risks of the use of telephone or video-conferencing that differ from in-person services (e.g., limits to patient confidentiality, limitations on the provider’s ability to observe the patient, limitations on the diagnostic tools available). I explained to the patient that I may determine at any point that telehealth services are not appropriate based on the patient’s circumstances and either party may therefore end the service to schedule an alternative in-person service or contact 911 to address a medical emergency. With the understanding of these risks, benefits and alternatives, the patient agreed to use the telephone or video-conferencing platform selected for this virtual session and further the patient confirmed his/her understanding that the services do not guarantee a specific outcome or recovery.  Patient confirms they are currently physically located in the [facility] at this time.     HPI     Patient is a 58-year-old female with past medical history of hypothyroidism dyslipidemia and hypertension who presents with chief complaint of COVID-19.  Symptom onset 3 days ago.  No shortness of breath but she does have nasal congestion and fatigue.  She is interested in starting Paxlovid    Review of Systems:  Constitutional: No fever or chills  Cardiovascular: no chest pain, no palpitations and no syncope.   Respiratory: cough, no shortness of breath during exertion and no shortness of breath at rest.   Gastrointestinal: no abdominal pain, no nausea and no vomiting.  Neuro: No Headache, no dizziness    Physical Exam:   Constitutional: Alert and in no acute distress. Well developed, well nourished.   Head and Face: Head and face: Normal.     Eyes: Normal external exam.    Ears, Nose, Mouth, and Throat: External inspection of ears and nose: " Normal.  Hearing: Normal.   Neck: No neck mass was observed. Supple.  Pulmonary: No respiratory distress.    Musculoskeletal: Range of motion: Normal.     Skin: Normal skin color and pigmentation, normal skin turgor, and no rash.    Neurologic: Coordination: Normal.    Psychiatric: Judgment and insight: Intact. Mood and affect: Normal.    Assessment/Plan   Problem List Items Addressed This Visit    None  Visit Diagnoses         Codes    COVID-19    -  Primary U07.1    Relevant Medications    nirmatrelvir-ritonavir (PAXLOVID) 300 mg (150 mg x 2)-100 mg tablet therapy pack            1.  Considering patient has obesity hypothyroidism hypertension will treat with Paxlovid.  Discussed risks benefits side effects and patient is agreeable to take the medication.  Should she develop shortness of breath or worsening symptoms please go to the emergency room.  Please stop the rosuvastatin for 8 days duration      This Medical recommendation has been made based on the Telephonic/Video conversation and the history is given by the patient, which I as a Physician and the patient also understand that there are limitations given the lack of an in-person Physical Exam. Patient will call back if symptoms don't improve.        A Doctor is always available by phone when the office is closed. Please feel free to call for help with any problem that you feel shouldn't wait until the office re-opens.     Holden Hare, DO

## 2024-01-09 ENCOUNTER — APPOINTMENT (OUTPATIENT)
Dept: PRIMARY CARE | Facility: CLINIC | Age: 59
End: 2024-01-09
Payer: COMMERCIAL

## 2024-01-12 NOTE — PROGRESS NOTES
"Heidi Quiroz female   1965 58 y.o.   [id number]      Chief Complaint  Biopsy consultation    History Of Present Illness  Heidi Quiroz \"Chelita\" is a very pleasant 58 y.o.  female [facility] by Tamika Castellano for biopsy consultation. She has no family history of breast cancer. She denies breast surgery. She has a remote history of left breast core biopsy in , benign cyst per patient report.     BREAST IMAGIN2023 Bilateral screening mammogram, indicates BI-RADS Category 0. Left breast microcalcifications and asymmetry warranting additional views. 2023 Left diagnostic mammogram and ultrasound, indicates BI-RADS Category 4. Left breast, indeterminate calcifications warranting stereotactic guided biopsy. Left breast asymmetry resolved. Incidental benign left breast minimally complicated cyst.     REPRODUCTIVE HISTORY: menarche age 12, , first birth age 29,  x 3-4 months, no OCP's, natural menopause age 49, no HRT, scattered fibroglandular tissue    FAMILY CANCER HISTORY:   Mother: Lung cancer, smoker, age 72  Father: Liver cancer, age 62  Brother (1): Lung cancer, smoker, age 63  Brother (2): Lung cancer, smoker, age 66      Surgical History  She has a past surgical history that includes Other surgical history (2020) and Other surgical history (2020).     Social History  She reports that she has quit smoking. Her smoking use included cigarettes. She has been exposed to tobacco smoke. She has never used smokeless tobacco. She reports that she does not currently use alcohol. She reports that she does not use drugs.    Family History  Family History   Problem Relation Name Age of Onset    Heart attack Mother      Lung cancer Mother      Hypertension Father      Liver cancer Father      Hypertension Brother      Other (psoriatic arthritis) Son          Allergies  Patient has no known allergies.    Medications  Current Outpatient Medications "   Medication Instructions    amitriptyline (Elavil) 10 mg tablet oral    amoxicillin (AMOXIL) 2,000 mg, oral, Every 12 hours scheduled    cholecalciferol (Vitamin D-3) 50 mcg (2,000 unit) capsule oral    clotrimazole-betamethasone (Lotrisone) cream Every 12 hours    fluticasone (Flonase) 50 mcg/actuation nasal spray nasal    hydrALAZINE (APRESOLINE) 50 mg, oral, 2 times daily    mesalamine (Lialda) 1.2 gram EC tablet 4 tablets, oral, Daily with evening meal    metaxalone (Skelaxin) 800 mg tablet oral    nebivolol (Bystolic) 10 mg tablet oral, Daily RT    omeprazole (PriLOSEC) 40 mg DR capsule oral    predniSONE (Deltasone) 20 mg tablet oral    propranolol LA (INDERAL LA) 80 mg, oral, Daily, Do not crush, chew, or split.    rosuvastatin (CRESTOR) 10 mg, oral, Daily         REVIEW OF SYSTEMS    Constitutional:  Negative for appetite change, fatigue, fever and unexpected weight change.   HENT:  Negative for ear pain, hearing loss, nosebleeds, sore throat and trouble swallowing.    Eyes:  Negative for discharge, itching and visual disturbance.   Respiratory:  Negative for cough, chest tightness and shortness of breath.    Cardiovascular:  Negative for chest pain, palpitations and leg swelling.   Breast: as indicated in HPI  Gastrointestinal:  Negative for abdominal pain, constipation, diarrhea and nausea.   Endocrine: Negative for cold intolerance and heat intolerance.   Genitourinary:  Negative for dysuria, frequency, hematuria, pelvic pain and vaginal bleeding.   Musculoskeletal:  Negative for arthralgias, back pain, gait problem, joint swelling and myalgias.   Skin:  Negative for color change and rash.   Allergic/Immunologic: Negative for environmental allergies and food allergies.   Neurological:  Negative for dizziness, tremors, speech difficulty, weakness, numbness and headaches.   Hematological:  Does not bruise/bleed easily.   Psychiatric/Behavioral:  Negative for agitation, dysphoric mood and sleep disturbance.  The patient is not nervous/anxious.         Past Medical History  She has a past medical history of Abnormal findings on diagnostic imaging of other specified body structures (02/13/2020), Anemia, unspecified (08/03/2021), Chronic rhinitis (10/17/2018), Cough, unspecified (08/03/2017), Dizziness and giddiness (07/26/2017), Dorsalgia, unspecified (07/27/2021), Dorsalgia, unspecified (03/28/2016), Elevated blood-pressure reading, without diagnosis of hypertension (04/23/2015), Encounter for screening for malignant neoplasm of colon (02/23/2022), Epigastric pain (08/03/2021), Essential (primary) hypertension (05/24/2022), Left lower quadrant pain (04/18/2018), Low back pain, unspecified (07/27/2021), Low back pain, unspecified (04/21/2016), Nonscarring hair loss, unspecified (10/14/2019), Nontoxic multinodular goiter (11/29/2021), Other chest pain (07/26/2017), Other specified disorders of bone, other site (10/11/2016), Overweight (10/14/2019), Pain in right knee (04/23/2015), Pain in unspecified hip (03/05/2020), Pain in unspecified knee (03/05/2020), Personal history of other diseases of the respiratory system (12/08/2016), Pure hypercholesterolemia, unspecified (11/15/2013), Spondylolysis, site unspecified (04/08/2016), Spondylolysis, site unspecified (04/08/2016), Spontaneous ecchymoses (04/13/2017), Unspecified abdominal pain (02/14/2020), Vitamin D deficiency, unspecified (09/13/2013), and Vitamin D deficiency, unspecified (07/13/2017).     Physical Exam  Patient is alert and oriented x3 and in a relaxed and appropriate mood. Her gait is steady and hand grasps are equal. Sclera is clear. The breasts are nearly symmetrical. The tissue is soft without palpable abnormalities, discrete nodules or masses. The skin and nipples appear normal. There is no cervical, supraclavicular or axillary lymphadenopathy. Heart rate and rhythm normal, S1 and S2 appreciated. The lungs are clear to auscultation bilaterally. Abdomen is  soft and non-tender.       Physical Exam     Last Recorded Vitals  Vitals:    01/22/24 0928   BP: 114/79   Pulse: 80       Relevant Results   Time was spent viewing digital images of the radiology testing with the patient. I explained the results in depth, along with suggested explanation for follow up recommendations based on the testing results. BI-RADS Category 4    Imaging  Narrative & Impression   Interpreted By:  Jannette Shea,   STUDY:  BI MAMMO LEFT DIAGNOSTIC TOMOSYNTHESIS; BI US BREAST LIMITED LEFT;  12/14/2023 8:37 am; 12/14/2023 9:05 am      ACCESSION NUMBER(S):  [id number]; [id number]      ORDERING CLINICIAN:  RAKEL BRANTLEY      INDICATION:  The patient was recalled from recent screening mammogram 12/05/2023  for left breast calcifications and asymmetry.      COMPARISON:  12/05/2023, 07/19/2022, 03/15/2021 and 08/19/2019..      FINDINGS:  MAMMOGRAPHY: 2D and tomosynthesis images were reviewed at 1 mm slice  thickness.      Density:  There are areas of scattered fibroglandular tissue.      The spot magnification views demonstrate a group of indeterminate  calcifications in the superolateral left breast at mid depth. The  superior left breast asymmetry resolves into a mixture of fatty and  fibroglandular tissue.      ULTRASOUND:  Targeted sonographic evaluation of the superolateral  left breast at posterior depth was performed using elastography. It  demonstrates an incidental small minimally complicated cyst in the 1  o'clock position 10 cm from the nipple which is avascular and soft on  elastography.      IMPRESSION:  Indeterminate left breast calcifications. Recommendations are for  surgical consultation and stereotactic biopsy. This was discussed in  great detail with the patient by Dr. Jannette Shea. A pre-procedure form  has been completed.      Incidental benign left breast minimally complicated cyst.      BI-RADS CATEGORY:      BI-RADS Category:  4 Suspicious.  Recommendation:   Biopsy.  Recommended Date:  Immediate.  Laterality:  Left.             BI mammo bilateral screening tomosynthesis 12/05/2023    Narrative  Interpreted By:  Shaye Quan,  STUDY:  BI MAMMO BILATERAL SCREENING TOMOSYNTHESIS;  12/5/2023 7:34 am    ACCESSION NUMBER(S):  [id number]    ORDERING CLINICIAN:  RAKEL BRANTLEY    INDICATION:  Screening.    COMPARISON:  07/19/2022, 03/15/2021, 08/19/2019, 08/09/2018    FINDINGS:  2D and tomosynthesis images were reviewed at 1 mm slice thickness.    Density:  There are areas of scattered fibroglandular tissue.    There are grouped microcalcifications in the upper outer left breast.  There is an asymmetry in the posterosuperior left breast. No  suspicious masses or calcifications are identified breast.    Impression  Left breast microcalcifications. Spot magnification views.    Left breast asymmetry. Additional imaging to include ultrasound  recommended.    No evidence of malignancy right breast.    BI-RADS CATEGORY:    BI-RADS Category:  0 Incomplete; Need Additional Imaging Evaluation  and/or Prior Mammograms for Comparison. Recommendation:  Additional  Imaging Diagnostic Mammogram. Recommended Date:  Immediate.  Laterality:  Left.    For any future breast imaging appointments, please call [phone]).      MACRO:  None    Signed by: Shaye Quan 12/7/2023 2:15 PM  Dictation workstation:   [id number]       Assessment/Plan   Abnormal mammogram, left breast calcifications, history of left breast core biopsy, benign, no family history of breast cancer, scattered fibroglandular tissue    Plan: Left breast stereotactic guided biopsy    Patient Discussion/Summary  I recommend a left breast mammogram guided biopsy. A breast radiology physician will perform the procedure. Possible diagnoses include benign, atypia or cancer. Bruising and mild discomfort after the biopsy is normal and will improve. I typically have results in 3-5 business days. I will call you with the  results, please have your phone handy to take my call. If you receive medical information from [facility] Personal Health Record, it is possible to view or see results of your biopsy or procedure before I contact you directly. I will provide recommendations for future follow up based on your biopsy results.     You can see your health information, review clinical summaries from office visits & test results online when you follow your health with MY  Chart, a personal health record. To sign up go to [email or web address]. If you need assistance with signing up or trouble getting into your account call [facility] Patient Line 24/7 at [phone].    Should you have any questions or concerns after biopsy, please do not hesitate to call my office at [phone]. If it has been more than a week since your biopsy was performed and you have not received results, please call my office at [phone]. Thank you for choosing [facility] and trusting me as your healthcare provider. I am honored to be a provider on your health care team and I remain dedicated to helping you achieve your health goals.      Joanna Rapp, APRN-CNP

## 2024-01-15 ENCOUNTER — HOSPITAL ENCOUNTER (OUTPATIENT)
Dept: RADIOLOGY | Facility: HOSPITAL | Age: 59
Discharge: HOME | End: 2024-01-15
Payer: COMMERCIAL

## 2024-01-15 DIAGNOSIS — Z78.0 MENOPAUSE: ICD-10-CM

## 2024-01-15 PROCEDURE — 77080 DXA BONE DENSITY AXIAL: CPT

## 2024-01-15 PROCEDURE — 77080 DXA BONE DENSITY AXIAL: CPT | Performed by: RADIOLOGY

## 2024-01-19 PROBLEM — R19.4 CHANGE IN BOWEL HABIT: Status: ACTIVE | Noted: 2023-06-13

## 2024-01-19 PROBLEM — H93.19 TINNITUS: Status: ACTIVE | Noted: 2023-02-28

## 2024-01-19 PROBLEM — R03.0 ELEVATED BLOOD PRESSURE READING WITHOUT DIAGNOSIS OF HYPERTENSION: Status: ACTIVE | Noted: 2024-01-19

## 2024-01-19 PROBLEM — R93.89 ABNORMAL CHEST CT: Status: ACTIVE | Noted: 2024-01-19

## 2024-01-19 PROBLEM — M25.552 PAIN IN LEFT HIP: Status: ACTIVE | Noted: 2024-01-19

## 2024-01-19 PROBLEM — M54.50 LOW BACK PAIN: Status: ACTIVE | Noted: 2023-08-16

## 2024-01-19 PROBLEM — R05.9 COUGH: Status: ACTIVE | Noted: 2024-01-19

## 2024-01-19 PROBLEM — M54.9 BACK ACHE: Status: ACTIVE | Noted: 2024-01-19

## 2024-01-19 PROBLEM — R93.7 ABNORMAL CT OF THORACIC SPINE: Status: ACTIVE | Noted: 2024-01-19

## 2024-01-19 PROBLEM — J02.9 SORE THROAT: Status: ACTIVE | Noted: 2024-01-19

## 2024-01-19 RX ORDER — OMEPRAZOLE 40 MG/1
CAPSULE, DELAYED RELEASE ORAL
COMMUNITY
Start: 2020-02-14 | End: 2024-01-31 | Stop reason: WASHOUT

## 2024-01-19 RX ORDER — METAXALONE 800 MG/1
TABLET ORAL
COMMUNITY
Start: 2014-10-28 | End: 2024-01-31 | Stop reason: WASHOUT

## 2024-01-19 RX ORDER — NEBIVOLOL 10 MG/1
TABLET ORAL
COMMUNITY
Start: 2017-07-26 | End: 2024-01-31 | Stop reason: WASHOUT

## 2024-01-19 RX ORDER — AMITRIPTYLINE HYDROCHLORIDE 10 MG/1
TABLET, FILM COATED ORAL
COMMUNITY
Start: 2021-07-26 | End: 2024-01-31 | Stop reason: WASHOUT

## 2024-01-19 RX ORDER — FLUTICASONE PROPIONATE 50 MCG
SPRAY, SUSPENSION (ML) NASAL
COMMUNITY
Start: 2018-10-17 | End: 2024-01-31 | Stop reason: WASHOUT

## 2024-01-19 RX ORDER — ACETAMINOPHEN 500 MG
1 TABLET ORAL DAILY
COMMUNITY
Start: 2013-11-22

## 2024-01-19 RX ORDER — PREDNISONE 20 MG/1
TABLET ORAL
COMMUNITY
Start: 2021-07-27 | End: 2024-01-31 | Stop reason: WASHOUT

## 2024-01-19 RX ORDER — CLOTRIMAZOLE AND BETAMETHASONE DIPROPIONATE 10; .64 MG/G; MG/G
CREAM TOPICAL EVERY 12 HOURS
COMMUNITY
Start: 2022-10-03 | End: 2024-01-31 | Stop reason: WASHOUT

## 2024-01-20 DIAGNOSIS — I10 PRIMARY HYPERTENSION: ICD-10-CM

## 2024-01-20 NOTE — RESULT ENCOUNTER NOTE
The bone density test was normal, I recommend repeat in 3 to 5 years.  I recommend calcium, vitamin D and weightbearing exercise to maintain bone health.

## 2024-01-22 ENCOUNTER — OFFICE VISIT (OUTPATIENT)
Dept: SURGICAL ONCOLOGY | Facility: CLINIC | Age: 59
End: 2024-01-22
Payer: COMMERCIAL

## 2024-01-22 ENCOUNTER — HOSPITAL ENCOUNTER (OUTPATIENT)
Dept: RADIOLOGY | Facility: CLINIC | Age: 59
Discharge: HOME | End: 2024-01-22
Payer: COMMERCIAL

## 2024-01-22 VITALS
WEIGHT: 208.11 LBS | HEART RATE: 80 BPM | BODY MASS INDEX: 36.88 KG/M2 | DIASTOLIC BLOOD PRESSURE: 79 MMHG | SYSTOLIC BLOOD PRESSURE: 114 MMHG | HEIGHT: 63 IN

## 2024-01-22 DIAGNOSIS — R92.1 MAMMOGRAPHIC CALCIFICATION FOUND ON DIAGNOSTIC IMAGING OF BREAST: ICD-10-CM

## 2024-01-22 DIAGNOSIS — R92.1 CALCIFICATION OF LEFT BREAST: ICD-10-CM

## 2024-01-22 DIAGNOSIS — R92.8 ABNORMAL MAMMOGRAM: Primary | ICD-10-CM

## 2024-01-22 PROCEDURE — 88305 TISSUE EXAM BY PATHOLOGIST: CPT | Performed by: PATHOLOGY

## 2024-01-22 PROCEDURE — 88360 TUMOR IMMUNOHISTOCHEM/MANUAL: CPT | Performed by: PATHOLOGY

## 2024-01-22 PROCEDURE — 3074F SYST BP LT 130 MM HG: CPT | Performed by: NURSE PRACTITIONER

## 2024-01-22 PROCEDURE — 1036F TOBACCO NON-USER: CPT | Performed by: NURSE PRACTITIONER

## 2024-01-22 PROCEDURE — 99214 OFFICE O/P EST MOD 30 MIN: CPT | Performed by: NURSE PRACTITIONER

## 2024-01-22 PROCEDURE — 2720000007 HC OR 272 NO HCPCS

## 2024-01-22 PROCEDURE — 88305 TISSUE EXAM BY PATHOLOGIST: CPT | Mod: TC,AHULAB | Performed by: NURSE PRACTITIONER

## 2024-01-22 PROCEDURE — 77065 DX MAMMO INCL CAD UNI: CPT | Mod: LT

## 2024-01-22 PROCEDURE — 77065 DX MAMMO INCL CAD UNI: CPT | Mod: LEFT SIDE | Performed by: STUDENT IN AN ORGANIZED HEALTH CARE EDUCATION/TRAINING PROGRAM

## 2024-01-22 PROCEDURE — 2500000005 HC RX 250 GENERAL PHARMACY W/O HCPCS: Performed by: STUDENT IN AN ORGANIZED HEALTH CARE EDUCATION/TRAINING PROGRAM

## 2024-01-22 PROCEDURE — 19081 BX BREAST 1ST LESION STRTCTC: CPT | Mod: LEFT SIDE | Performed by: STUDENT IN AN ORGANIZED HEALTH CARE EDUCATION/TRAINING PROGRAM

## 2024-01-22 PROCEDURE — 3078F DIAST BP <80 MM HG: CPT | Performed by: NURSE PRACTITIONER

## 2024-01-22 PROCEDURE — 19081 BX BREAST 1ST LESION STRTCTC: CPT | Mod: LT

## 2024-01-22 RX ORDER — ROSUVASTATIN CALCIUM 10 MG/1
10 TABLET, COATED ORAL DAILY
Qty: 30 TABLET | Refills: 0 | Status: SHIPPED | OUTPATIENT
Start: 2024-01-22 | End: 2024-02-14

## 2024-01-22 RX ADMIN — Medication 10 ML: at 10:10

## 2024-01-22 ASSESSMENT — PAIN SCALES - GENERAL
PAINLEVEL_OUTOF10: 0 - NO PAIN
PAINLEVEL: 0-NO PAIN
PAINLEVEL_OUTOF10: 0 - NO PAIN

## 2024-01-22 ASSESSMENT — PAIN - FUNCTIONAL ASSESSMENT: PAIN_FUNCTIONAL_ASSESSMENT: 0-10

## 2024-01-22 NOTE — PATIENT INSTRUCTIONS
I recommend a left breast mammogram guided biopsy. A breast radiology physician will perform the procedure. Possible diagnoses include benign, atypia or cancer. Bruising and mild discomfort after the biopsy is normal and will improve. I typically have results in 3-5 business days. I will call you with the results, please have your phone handy to take my call. If you receive medical information from [facility] Personal Health Record, it is possible to view or see results of your biopsy or procedure before I contact you directly. I will provide recommendations for future follow up based on your biopsy results.     You can see your health information, review clinical summaries from office visits & test results online when you follow your health with MY  Chart, a personal health record. To sign up go to [email or web address]. If you need assistance with signing up or trouble getting into your account call "[facility]" Patient Line 24/7 at [phone].    Should you have any questions or concerns after biopsy, please do not hesitate to call my office at [phone]. If it has been more than a week since your biopsy was performed and you have not received results, please call my office at [phone]. Thank you for choosing [facility] and trusting me as your healthcare provider. I am honored to be a provider on your health care team and I remain dedicated to helping you achieve your health goals.

## 2024-01-22 NOTE — Clinical Note
Procedural steps explained and patient given opportunity to verbalize concerns and seek clarification.  Post procedure self-care and potential for bruising , hematoma, and pain reviewed.  Patient verbalizes understanding.   Patient offered aromatherapy, warm blankets and music. Guided imagery, touch and relaxation breathing to be used throughout the procedure.

## 2024-01-23 ENCOUNTER — TELEPHONE (OUTPATIENT)
Dept: RADIOLOGY | Facility: CLINIC | Age: 59
End: 2024-01-23
Payer: COMMERCIAL

## 2024-01-24 ENCOUNTER — TELEPHONE (OUTPATIENT)
Dept: SURGERY | Facility: HOSPITAL | Age: 59
End: 2024-01-24
Payer: COMMERCIAL

## 2024-01-24 NOTE — TELEPHONE ENCOUNTER
Spoke with Heidi regarding left breast biopsy results. Surgical excision recommended. Referred to Samia Banda.

## 2024-01-26 LAB
LAB AP ASR DISCLAIMER: NORMAL
LABORATORY COMMENT REPORT: NORMAL
PATH REPORT.ADDENDUM SPEC: NORMAL
PATH REPORT.FINAL DX SPEC: NORMAL
PATH REPORT.GROSS SPEC: NORMAL
PATH REPORT.RELEVANT HX SPEC: NORMAL
PATH REPORT.TOTAL CANCER: NORMAL

## 2024-01-29 NOTE — H&P (VIEW-ONLY)
Chief Complaint  New left breast DCIS, ER negative.    History of Present Illness    Referring Provider: BRITANY Rapp  PCP BRITANY Hare DO    57 yo Ashkenazi Rastafarian,  female here with New left breast DCIS, ER negative.    History:  1) No abnormal mammograms, breast biopsies, or breast surgeries.  2) 2022.  Bilateral mammogram.  Scattered fibroglandular tissue bilaterally.  BI-RADS 1.  3) 2023.  Scattered fibroglandular tissue.  Right breast negative.  Left breast upper outer calcifications.  Left breast asymmetry, BI-RADS 0.  4) 2023.  Left breast diagnostic imaging.  Left breast indeterminate calcifications are confirmed.  Left breast asymmetry resolves.  Targeted ultrasound was performed.  No suspicious findings.  BI-RADS 4.  Left breast stereotactic biopsy of calcifications is recommended.  5) 2024.Left breast calcifications biopsy.  High-grade DCIS, ER negative.  Dumbbell Tri Jesse clip is in good position.  Calcifications span approximately 1 cm on mammogram.      She presents today for further management and surgical discussion.    Ob/gyn history:  Menarche 12  Menopause 49  , age of first delivery 29  no OCPs, no fertility treatments, no HRT    Mother with lung cancer at 72  Father with liver cancer at 62  Brother with lung cancer at 63  Brother with lung cancer at 66    Review of systems  A comprehensive ROS was taken on the patient intake form and reviewed with the patient. This form is scanned into the electronic medical record.    Constitutional symptoms: Denies generalized fatigue. Denies weight change, fevers/chills, difficulty sleeping   Eyes: Denies double vision, glaucoma, cataracts.  Ear/nose/throat/mouth: Denies hearing changes, sore throat, sinus problems.  Cardiovascular: No chest pain. Denies irregular heartbeat. Denies ankle swelling.  Respiratory: No wheezing, cough, or shortness of breath.  Gastrointestinal: No abdominal pain, No nausea/vomiting. No  indigestion/heartburn. No change in bowel habits. No constipation or diarrhea.   Genitourinary: No urinary incontinence. No urinary frequency. No painful urination.  Musculoskeletal: No bone pain, no muscle pain, no joint pain.   Integumentary: No rash. No masses. No changes in moles. No easy bruising.  Neurological: No headaches. No tremors. No numbness/tingling.  Psychiatric: No anxiety. No depression.  Endocrine: No excessive thirst. Not too hot or too cold. Not tired or fatigued.   Hematological/lymphatic: No swollen glands or blood clotting problems. No bruising.     Physical exam  A chaperone was offered for all portions of the physical exam. The patient declined.     General appearance: appears stated age, alert and oriented x 3  Head: Normocephalic, atraumatic  Eyes: conjunctivae/corneas clear.  Ears: External ears are normal, hearing is grossly intact.  Lungs: normal breathing  Heart: regular   Abdomen: Soft, nontender, nondistended.  Neurologic: grossly normal  Lymph nodes: No cervical, supraclavicular or axillary lymphadenopathy bilaterally    Breast: A comprehensive breast exam was performed in the seated and supine positions. Breasts are symmetrical. Bilateral nipples are everted. There are no skin changes on arm maneuvers. Bra size: 44D   Right: No masses   Left: No masses.  Biopsy site upper outer breast clean.  Minimal postbiopsy site change.    Results  Imaging  All breast imaging personally reviewed by me.  See HPI    Pathology  January 22, 2024.Left breast calcifications biopsy.  High-grade DCIS, ER negative.  Dumbbell Tri Jesse clip is in good position.  Calcifications span approximately 1 cm on mammogram.    Impression:   1) 59 yo Ashkenazi Episcopalian,  female here with New left breast DCIS, ER negative.  2) Co-morbidities include hypertension, hyperlipidemia.. Non-smoker  3) No family history of breast or ovarian cancer.  Referred to genetics.      Plan:   I had a long discussion with Ms.  Gabriella today. We reviewed her imaging and work-up to date and the results.  She has a new  left breast DCIS, ER-.  We reviewed the multidisciplinary treatment of breast cancer.     Her cancer can be effectively treated with lumpectomy.  We reviewed that lumpectomy with radiation treatment was equivalent to mastectomy in terms of overall survival and distant recurrence, with more than 20 years of follow-up data.  We discussed that lumpectomy with radiation has a slightly higher local recurrence rate, at about 5% in 10 years, versus mastectomy at 1% in 10 years.  The risks of lumpectomy including bleeding, infection, and need for reexcision of margins (approximately 20%), was discussed.  She was told that lumpectomy is usually a day surgery, and the postoperative recovery was discussed. She was informed that decisions regarding lumpectomy versus mastectomy would not impact need for chemotherapy.  We reviewed the need for preoperative mag seed placement as this is a nonpalpable lesion.    We discussed the requirements of radiation treatment, and the short term risks (mild burn, tiredness, swelling or shrinking of breast) and the long term risks (skin changes, angiosarcoma).    We discussed mastectomy with and without reconstruction. I explained that mastectomy results in loss of sensation over the breast.  The risks were described as bleeding, infection, and flap necrosis.  The patient was told that at least one drain would be required.  She was told that no reconstruction would result in a flat chest, and that a prosthetic could be used which, when she was in a bra and clothing, could not be distinguished from a breast.  Reconstruction options were discussed briefly. She was informed that the plastic surgeon would make the final decision regarding her appropriateness for either type of surgery, and whether she would be a candidate for immediate reconstruction.  She was offered referral to a plastic surgeon.      She  is clinically node negative.  We discussed management of the axilla.  There is no role for axillary staging in the setting of DCIS treated with primary lumpectomy.  We discussed the small chance of finding an invasive cancer on final pathology.  If this were to happen we would discuss axillary staging with sentinel lymph node biopsy.    She has no family history of breast or ovarian cancer.  She has Ashkenazi Hindu heritage.  Therefore she has been referred to genetic counseling for consideration of genetic testing.  We briefly reviewed how genetic testing results might impact surgical recommendations.    She is an excellent candidate for lumpectomy and this is her preference.  She will be scheduled for surgery.  She will need a preoperative Magseed placement.  She will follow-up about 2 weeks after surgery to review her pathology report.  She should call the office with any sooner concerns.

## 2024-01-29 NOTE — PROGRESS NOTES
Chief Complaint  New left breast DCIS, ER negative.    History of Present Illness    Referring Provider: BRITANY Rapp  PCP BRITANY Hare DO    57 yo Ashkenazi Nondenominational,  female here with New left breast DCIS, ER negative.    History:  1) No abnormal mammograms, breast biopsies, or breast surgeries.  2) 2022.  Bilateral mammogram.  Scattered fibroglandular tissue bilaterally.  BI-RADS 1.  3) 2023.  Scattered fibroglandular tissue.  Right breast negative.  Left breast upper outer calcifications.  Left breast asymmetry, BI-RADS 0.  4) 2023.  Left breast diagnostic imaging.  Left breast indeterminate calcifications are confirmed.  Left breast asymmetry resolves.  Targeted ultrasound was performed.  No suspicious findings.  BI-RADS 4.  Left breast stereotactic biopsy of calcifications is recommended.  5) 2024.Left breast calcifications biopsy.  High-grade DCIS, ER negative.  Dumbbell Tri Jesse clip is in good position.  Calcifications span approximately 1 cm on mammogram.      She presents today for further management and surgical discussion.    Ob/gyn history:  Menarche 12  Menopause 49  , age of first delivery 29  no OCPs, no fertility treatments, no HRT    Mother with lung cancer at 72  Father with liver cancer at 62  Brother with lung cancer at 63  Brother with lung cancer at 66    Review of systems  A comprehensive ROS was taken on the patient intake form and reviewed with the patient. This form is scanned into the electronic medical record.    Constitutional symptoms: Denies generalized fatigue. Denies weight change, fevers/chills, difficulty sleeping   Eyes: Denies double vision, glaucoma, cataracts.  Ear/nose/throat/mouth: Denies hearing changes, sore throat, sinus problems.  Cardiovascular: No chest pain. Denies irregular heartbeat. Denies ankle swelling.  Respiratory: No wheezing, cough, or shortness of breath.  Gastrointestinal: No abdominal pain, No nausea/vomiting. No  indigestion/heartburn. No change in bowel habits. No constipation or diarrhea.   Genitourinary: No urinary incontinence. No urinary frequency. No painful urination.  Musculoskeletal: No bone pain, no muscle pain, no joint pain.   Integumentary: No rash. No masses. No changes in moles. No easy bruising.  Neurological: No headaches. No tremors. No numbness/tingling.  Psychiatric: No anxiety. No depression.  Endocrine: No excessive thirst. Not too hot or too cold. Not tired or fatigued.   Hematological/lymphatic: No swollen glands or blood clotting problems. No bruising.     Physical exam  A chaperone was offered for all portions of the physical exam. The patient declined.     General appearance: appears stated age, alert and oriented x 3  Head: Normocephalic, atraumatic  Eyes: conjunctivae/corneas clear.  Ears: External ears are normal, hearing is grossly intact.  Lungs: normal breathing  Heart: regular   Abdomen: Soft, nontender, nondistended.  Neurologic: grossly normal  Lymph nodes: No cervical, supraclavicular or axillary lymphadenopathy bilaterally    Breast: A comprehensive breast exam was performed in the seated and supine positions. Breasts are symmetrical. Bilateral nipples are everted. There are no skin changes on arm maneuvers. Bra size: 44D   Right: No masses   Left: No masses.  Biopsy site upper outer breast clean.  Minimal postbiopsy site change.    Results  Imaging  All breast imaging personally reviewed by me.  See HPI    Pathology  January 22, 2024.Left breast calcifications biopsy.  High-grade DCIS, ER negative.  Dumbbell Tri Jesse clip is in good position.  Calcifications span approximately 1 cm on mammogram.    Impression:   1) 59 yo Ashkenazi Confucianist,  female here with New left breast DCIS, ER negative.  2) Co-morbidities include hypertension, hyperlipidemia.. Non-smoker  3) No family history of breast or ovarian cancer.  Referred to genetics.      Plan:   I had a long discussion with Ms.  Gabriella today. We reviewed her imaging and work-up to date and the results.  She has a new  left breast DCIS, ER-.  We reviewed the multidisciplinary treatment of breast cancer.     Her cancer can be effectively treated with lumpectomy.  We reviewed that lumpectomy with radiation treatment was equivalent to mastectomy in terms of overall survival and distant recurrence, with more than 20 years of follow-up data.  We discussed that lumpectomy with radiation has a slightly higher local recurrence rate, at about 5% in 10 years, versus mastectomy at 1% in 10 years.  The risks of lumpectomy including bleeding, infection, and need for reexcision of margins (approximately 20%), was discussed.  She was told that lumpectomy is usually a day surgery, and the postoperative recovery was discussed. She was informed that decisions regarding lumpectomy versus mastectomy would not impact need for chemotherapy.  We reviewed the need for preoperative mag seed placement as this is a nonpalpable lesion.    We discussed the requirements of radiation treatment, and the short term risks (mild burn, tiredness, swelling or shrinking of breast) and the long term risks (skin changes, angiosarcoma).    We discussed mastectomy with and without reconstruction. I explained that mastectomy results in loss of sensation over the breast.  The risks were described as bleeding, infection, and flap necrosis.  The patient was told that at least one drain would be required.  She was told that no reconstruction would result in a flat chest, and that a prosthetic could be used which, when she was in a bra and clothing, could not be distinguished from a breast.  Reconstruction options were discussed briefly. She was informed that the plastic surgeon would make the final decision regarding her appropriateness for either type of surgery, and whether she would be a candidate for immediate reconstruction.  She was offered referral to a plastic surgeon.      She  is clinically node negative.  We discussed management of the axilla.  There is no role for axillary staging in the setting of DCIS treated with primary lumpectomy.  We discussed the small chance of finding an invasive cancer on final pathology.  If this were to happen we would discuss axillary staging with sentinel lymph node biopsy.    She has no family history of breast or ovarian cancer.  She has Ashkenazi Roman Catholic heritage.  Therefore she has been referred to genetic counseling for consideration of genetic testing.  We briefly reviewed how genetic testing results might impact surgical recommendations.    She is an excellent candidate for lumpectomy and this is her preference.  She will be scheduled for surgery.  She will need a preoperative Magseed placement.  She will follow-up about 2 weeks after surgery to review her pathology report.  She should call the office with any sooner concerns.

## 2024-01-31 ENCOUNTER — OFFICE VISIT (OUTPATIENT)
Dept: SURGICAL ONCOLOGY | Facility: CLINIC | Age: 59
End: 2024-01-31
Payer: COMMERCIAL

## 2024-01-31 VITALS
BODY MASS INDEX: 36.59 KG/M2 | SYSTOLIC BLOOD PRESSURE: 147 MMHG | HEART RATE: 69 BPM | DIASTOLIC BLOOD PRESSURE: 81 MMHG | RESPIRATION RATE: 18 BRPM | WEIGHT: 206.57 LBS

## 2024-01-31 DIAGNOSIS — D05.12 DUCTAL CARCINOMA IN SITU (DCIS) OF LEFT BREAST: Primary | ICD-10-CM

## 2024-01-31 DIAGNOSIS — D05.12 DUCTAL CARCINOMA IN SITU (DCIS) OF LEFT BREAST: ICD-10-CM

## 2024-01-31 PROCEDURE — 99205 OFFICE O/P NEW HI 60 MIN: CPT | Performed by: SURGERY

## 2024-01-31 PROCEDURE — 3077F SYST BP >= 140 MM HG: CPT | Performed by: SURGERY

## 2024-01-31 PROCEDURE — 99215 OFFICE O/P EST HI 40 MIN: CPT | Performed by: SURGERY

## 2024-01-31 PROCEDURE — 3079F DIAST BP 80-89 MM HG: CPT | Performed by: SURGERY

## 2024-01-31 PROCEDURE — 1036F TOBACCO NON-USER: CPT | Performed by: SURGERY

## 2024-01-31 RX ORDER — CEFAZOLIN SODIUM 2 G/50ML
2 SOLUTION INTRAVENOUS ONCE
Status: CANCELLED | OUTPATIENT
Start: 2024-01-31 | End: 2024-01-31

## 2024-01-31 RX ORDER — SODIUM CHLORIDE 9 MG/ML
100 INJECTION, SOLUTION INTRAVENOUS CONTINUOUS
Status: CANCELLED | OUTPATIENT
Start: 2024-01-31

## 2024-01-31 ASSESSMENT — PAIN SCALES - GENERAL: PAINLEVEL: 0-NO PAIN

## 2024-01-31 NOTE — PATIENT INSTRUCTIONS
We discussed postoperative instructions and expectations today. Please contact our office if you have any questions.    You were given Breast Cancer binder today with additional information regarding breast cancer and pathology, along with additional resources. Please contact our office if you have any questions.

## 2024-02-07 ENCOUNTER — TELEMEDICINE CLINICAL SUPPORT (OUTPATIENT)
Dept: PREADMISSION TESTING | Facility: HOSPITAL | Age: 59
End: 2024-02-07
Payer: COMMERCIAL

## 2024-02-07 RX ORDER — ACETAMINOPHEN 500 MG
500 TABLET ORAL EVERY 6 HOURS PRN
COMMUNITY
End: 2024-05-17 | Stop reason: HOSPADM

## 2024-02-08 ENCOUNTER — HOSPITAL ENCOUNTER (OUTPATIENT)
Dept: RADIOLOGY | Facility: CLINIC | Age: 59
Discharge: HOME | End: 2024-02-08
Payer: COMMERCIAL

## 2024-02-08 DIAGNOSIS — D05.12 INTRADUCTAL CARCINOMA IN SITU OF LEFT BREAST: ICD-10-CM

## 2024-02-08 DIAGNOSIS — D05.12 DUCTAL CARCINOMA IN SITU (DCIS) OF LEFT BREAST: ICD-10-CM

## 2024-02-08 PROCEDURE — 2500000005 HC RX 250 GENERAL PHARMACY W/O HCPCS: Performed by: STUDENT IN AN ORGANIZED HEALTH CARE EDUCATION/TRAINING PROGRAM

## 2024-02-08 PROCEDURE — A4648 IMPLANTABLE TISSUE MARKER: HCPCS

## 2024-02-08 PROCEDURE — 2780000003 HC OR 278 NO HCPCS

## 2024-02-08 PROCEDURE — 19281 PERQ DEVICE BREAST 1ST IMAG: CPT | Mod: LT

## 2024-02-08 PROCEDURE — 19281 PERQ DEVICE BREAST 1ST IMAG: CPT | Mod: LEFT SIDE | Performed by: STUDENT IN AN ORGANIZED HEALTH CARE EDUCATION/TRAINING PROGRAM

## 2024-02-08 RX ADMIN — Medication 10 ML: at 15:02

## 2024-02-08 ASSESSMENT — PAIN - FUNCTIONAL ASSESSMENT
PAIN_FUNCTIONAL_ASSESSMENT: 0-10
PAIN_FUNCTIONAL_ASSESSMENT: 0-10

## 2024-02-08 ASSESSMENT — PAIN SCALES - GENERAL
PAINLEVEL_OUTOF10: 0 - NO PAIN
PAINLEVEL_OUTOF10: 3
PAINLEVEL_OUTOF10: 1

## 2024-02-08 NOTE — DISCHARGE INSTRUCTIONS
1450 Procedural steps explained and patient given opportunity to verbalize concerns and seek clarification.  Post procedure self-care and potential for bruising , hematoma, and pain reviewed.  Patient verbalizes understanding.   7440 Post procedure care reviewed with patient, ok to resume normal activity with no restrictions. Patient verbalized understanding and will follow up surgery as planned.    DC home

## 2024-02-12 ENCOUNTER — PRE-ADMISSION TESTING (OUTPATIENT)
Dept: PREADMISSION TESTING | Facility: HOSPITAL | Age: 59
End: 2024-02-12
Payer: COMMERCIAL

## 2024-02-12 ENCOUNTER — LAB (OUTPATIENT)
Dept: LAB | Facility: LAB | Age: 59
End: 2024-02-12
Payer: COMMERCIAL

## 2024-02-12 ENCOUNTER — APPOINTMENT (OUTPATIENT)
Dept: PREADMISSION TESTING | Facility: HOSPITAL | Age: 59
End: 2024-02-12
Payer: COMMERCIAL

## 2024-02-12 VITALS
HEART RATE: 60 BPM | RESPIRATION RATE: 16 BRPM | WEIGHT: 207.67 LBS | BODY MASS INDEX: 35.45 KG/M2 | TEMPERATURE: 97.8 F | OXYGEN SATURATION: 98 % | HEIGHT: 64 IN | DIASTOLIC BLOOD PRESSURE: 75 MMHG | SYSTOLIC BLOOD PRESSURE: 135 MMHG

## 2024-02-12 DIAGNOSIS — Z01.818 PREOP TESTING: ICD-10-CM

## 2024-02-12 DIAGNOSIS — Z01.818 PREOP TESTING: Primary | ICD-10-CM

## 2024-02-12 DIAGNOSIS — E05.90 HYPERTHYROIDISM: ICD-10-CM

## 2024-02-12 DIAGNOSIS — E78.00 PURE HYPERCHOLESTEROLEMIA: ICD-10-CM

## 2024-02-12 LAB
ANION GAP SERPL CALC-SCNC: 12 MMOL/L (ref 10–20)
BASOPHILS # BLD AUTO: 0.05 X10*3/UL (ref 0–0.1)
BASOPHILS NFR BLD AUTO: 0.7 %
BUN SERPL-MCNC: 9 MG/DL (ref 6–23)
CALCIUM SERPL-MCNC: 8.7 MG/DL (ref 8.6–10.3)
CHLORIDE SERPL-SCNC: 104 MMOL/L (ref 98–107)
CHOLEST SERPL-MCNC: 165 MG/DL (ref 0–199)
CHOLESTEROL/HDL RATIO: 3.5
CO2 SERPL-SCNC: 29 MMOL/L (ref 21–32)
CREAT SERPL-MCNC: 0.71 MG/DL (ref 0.5–1.05)
EGFRCR SERPLBLD CKD-EPI 2021: >90 ML/MIN/1.73M*2
EOSINOPHIL # BLD AUTO: 0.17 X10*3/UL (ref 0–0.7)
EOSINOPHIL NFR BLD AUTO: 2.3 %
ERYTHROCYTE [DISTWIDTH] IN BLOOD BY AUTOMATED COUNT: 13.1 % (ref 11.5–14.5)
GLUCOSE SERPL-MCNC: 89 MG/DL (ref 74–99)
HCT VFR BLD AUTO: 42 % (ref 36–46)
HDLC SERPL-MCNC: 46.5 MG/DL
HGB BLD-MCNC: 13.1 G/DL (ref 12–16)
IMM GRANULOCYTES # BLD AUTO: 0.02 X10*3/UL (ref 0–0.7)
IMM GRANULOCYTES NFR BLD AUTO: 0.3 % (ref 0–0.9)
LDLC SERPL CALC-MCNC: 69 MG/DL
LYMPHOCYTES # BLD AUTO: 1.83 X10*3/UL (ref 1.2–4.8)
LYMPHOCYTES NFR BLD AUTO: 24.5 %
MCH RBC QN AUTO: 26.5 PG (ref 26–34)
MCHC RBC AUTO-ENTMCNC: 31.2 G/DL (ref 32–36)
MCV RBC AUTO: 85 FL (ref 80–100)
MONOCYTES # BLD AUTO: 0.48 X10*3/UL (ref 0.1–1)
MONOCYTES NFR BLD AUTO: 6.4 %
NEUTROPHILS # BLD AUTO: 4.92 X10*3/UL (ref 1.2–7.7)
NEUTROPHILS NFR BLD AUTO: 65.8 %
NON HDL CHOLESTEROL: 119 MG/DL (ref 0–149)
NRBC BLD-RTO: 0 /100 WBCS (ref 0–0)
PLATELET # BLD AUTO: 222 X10*3/UL (ref 150–450)
POTASSIUM SERPL-SCNC: 4.3 MMOL/L (ref 3.5–5.3)
RBC # BLD AUTO: 4.94 X10*6/UL (ref 4–5.2)
SODIUM SERPL-SCNC: 141 MMOL/L (ref 136–145)
TRIGL SERPL-MCNC: 249 MG/DL (ref 0–149)
TSH SERPL-ACNC: 1.12 MIU/L (ref 0.44–3.98)
VLDL: 50 MG/DL (ref 0–40)
WBC # BLD AUTO: 7.5 X10*3/UL (ref 4.4–11.3)

## 2024-02-12 PROCEDURE — 80061 LIPID PANEL: CPT

## 2024-02-12 PROCEDURE — 99203 OFFICE O/P NEW LOW 30 MIN: CPT | Performed by: PHYSICIAN ASSISTANT

## 2024-02-12 PROCEDURE — 93005 ELECTROCARDIOGRAM TRACING: CPT | Performed by: PHYSICIAN ASSISTANT

## 2024-02-12 PROCEDURE — 36415 COLL VENOUS BLD VENIPUNCTURE: CPT

## 2024-02-12 PROCEDURE — 85025 COMPLETE CBC W/AUTO DIFF WBC: CPT

## 2024-02-12 PROCEDURE — 93010 ELECTROCARDIOGRAM REPORT: CPT | Performed by: INTERNAL MEDICINE

## 2024-02-12 PROCEDURE — 80048 BASIC METABOLIC PNL TOTAL CA: CPT

## 2024-02-12 PROCEDURE — 84443 ASSAY THYROID STIM HORMONE: CPT

## 2024-02-12 ASSESSMENT — ENCOUNTER SYMPTOMS
EYES NEGATIVE: 1
CARDIOVASCULAR NEGATIVE: 1
HEMATOLOGIC/LYMPHATIC NEGATIVE: 1
GASTROINTESTINAL NEGATIVE: 1
PSYCHIATRIC NEGATIVE: 1
NEUROLOGICAL NEGATIVE: 1
ALLERGIC/IMMUNOLOGIC NEGATIVE: 1
ENDOCRINE NEGATIVE: 1
RESPIRATORY NEGATIVE: 1
CONSTITUTIONAL NEGATIVE: 1
BACK PAIN: 1

## 2024-02-12 NOTE — CPM/PAT H&P
"[facility]/PAT Evaluation       Name: Heidi Quiroz (Heidi Quiroz \"Chelita\")  /Age: 1965/58 y.o.     In-Person       Chief Complaint: Ductal carcinoma in situ (DCIS) of left breas     HPI    Date of Consult: 24    Referring Provider: Dr. Banda    Surgery, Date, and Length: Left Breast Magseed Localized Partial Mastectomy ; 2/15/24; 120 minutes     Heidi Quiroz  is a 58 year-old female who presents to the [facility] for perioperative risk assessment prior to surgery.  2023 Bilateral screening mammogram, indicates BI-RADS Category 0. Left breast microcalcifications and asymmetry warranting additional views. 2023 Left diagnostic mammogram and ultrasound, indicates BI-RADS Category 4. Left breast, indeterminate calcifications warranting stereotactic guided biopsy. Pathology showed Ductal carcinoma in situ, high nuclear grade, solid pattern with necrosis and calcifications., ER negative.  Patient is  menarche age 12,  first birth age 29,  x 3-4 months, no OCP's, natural menopause age 49, no HRT.    This note was created in part upon personal review of patient's medical records.      Patient is scheduled to have Left Breast Magseed Localized Partial Mastectomy     Medical History  Past Medical History:   Diagnosis Date    Anemia     Cholelithiasis     Chronic rhinitis     Ductal carcinoma in situ (DCIS) of left breast     Essential (primary) hypertension     GERD (gastroesophageal reflux disease)     Hearing loss     Hypothyroidism     Insomnia     Lung nodule     Nontoxic multinodular goiter     Pure hypercholesterolemia, unspecified     Ulcerative colitis (CMS/HCC)     Vitamin D deficiency, unspecified         STOP BANG = 3    Caprini = 6    Surgical History  Past Surgical History:   Procedure Laterality Date    COLONOSCOPY      DILATION AND CURETTAGE OF UTERUS      TOTAL HIP ARTHROPLASTY Left              Family history:  Family History   Problem Relation Name Age of Onset    Heart " attack Mother      Lung cancer Mother      Hypertension Father      Liver cancer Father      Hypertension Brother      Lung cancer Brother      Other (psoriatic arthritis) Son          Social history:  Social History     Socioeconomic History    Marital status:      Spouse name: Not on file    Number of children: Not on file    Years of education: Not on file    Highest education level: Not on file   Occupational History    Occupation: Pharmacist   Tobacco Use    Smoking status: Former     Packs/day: 0.50     Years: 7.00     Additional pack years: 0.00     Total pack years: 3.50     Types: Cigarettes     Quit date:      Years since quittin.1     Passive exposure: Past    Smokeless tobacco: Never   Vaping Use    Vaping Use: Never used   Substance and Sexual Activity    Alcohol use: Not Currently    Drug use: Never    Sexual activity: Defer   Other Topics Concern    Not on file   Social History Narrative    Not on file     Social Determinants of Health     Financial Resource Strain: Not on file   Food Insecurity: Not on file   Transportation Needs: Not on file   Physical Activity: Not on file   Stress: Not on file   Social Connections: Not on file   Intimate Partner Violence: Not on file   Housing Stability: Not on file          Current Outpatient Medications:     acetaminophen (Tylenol) 500 mg tablet, Take 1 tablet (500 mg) by mouth every 6 hours if needed for mild pain (1 - 3)., Disp: , Rfl:     amoxicillin (Amoxil) 500 mg tablet, Take 4 tablets (2,000 mg) by mouth every 12 hours., Disp: , Rfl:     cholecalciferol (Vitamin D-3) 50 mcg (2,000 unit) capsule, Take by mouth., Disp: , Rfl:     mesalamine (Lialda) 1.2 gram EC tablet, Take 4 tablets (4.8 g) by mouth once daily in the evening. Take with meals., Disp: , Rfl:     propranolol LA (Inderal LA) 80 mg 24 hr capsule, Take 1 capsule (80 mg) by mouth once daily. Do not crush, chew, or split., Disp: 90 capsule, Rfl: 1    rosuvastatin (Crestor) 10 mg  "tablet, Take 1 tablet (10 mg) by mouth once daily. ----DUE FOR LABS, Disp: 30 tablet, Rfl: 0         Visit Vitals  /75   Pulse 60   Temp 36.6 °C (97.8 °F)   Resp 16   Ht 1.625 m (5' 3.98\")   Wt 94.2 kg (207 lb 10.8 oz)   LMP  (LMP Unknown)   SpO2 98%   BMI 35.67 kg/m²   OB Status Postmenopausal   Smoking Status Former   BSA 2.06 m²        Review of Systems   Constitutional: Negative.    HENT: Negative.     Eyes: Negative.    Respiratory: Negative.     Cardiovascular: Negative.    Gastrointestinal: Negative.    Endocrine: Negative.    Genitourinary: Negative.    Musculoskeletal:  Positive for back pain (chronic low back).   Skin: Negative.    Allergic/Immunologic: Negative.    Neurological: Negative.    Hematological: Negative.    Psychiatric/Behavioral: Negative.          Physical Exam  Vitals reviewed.   Constitutional:       Appearance: Normal appearance.   HENT:      Head: Normocephalic and atraumatic.      Right Ear: External ear normal.      Left Ear: External ear normal.      Nose: Nose normal.      Mouth/Throat:      Pharynx: Oropharynx is clear.   Eyes:      Extraocular Movements: Extraocular movements intact.      Conjunctiva/sclera: Conjunctivae normal.      Pupils: Pupils are equal, round, and reactive to light.   Cardiovascular:      Rate and Rhythm: Normal rate and regular rhythm.      Heart sounds: Normal heart sounds.   Pulmonary:      Effort: Pulmonary effort is normal.      Breath sounds: Normal breath sounds.   Abdominal:      Palpations: Abdomen is soft.   Musculoskeletal:         General: Normal range of motion.      Cervical back: Normal range of motion and neck supple.   Skin:     General: Skin is warm and dry.   Neurological:      General: No focal deficit present.      Mental Status: She is alert and oriented to person, place, and time.   Psychiatric:         Mood and Affect: Mood normal.         Behavior: Behavior normal.          PAT AIRWAY:   Airway:     Mallampati::  III    Neck " ROM::  Full    Lab Results   Component Value Date    WBC 7.5 02/12/2024    HGB 13.1 02/12/2024    HCT 42.0 02/12/2024    MCV 85 02/12/2024     02/12/2024      Lab Results   Component Value Date    GLUCOSE 89 02/12/2024    CALCIUM 8.7 02/12/2024     02/12/2024    K 4.3 02/12/2024    CO2 29 02/12/2024     02/12/2024    BUN 9 02/12/2024    CREATININE 0.71 02/12/2024      EKG 2/12/24  NSR  Low voltage QRS  Junctional ST depression, probably abnormal  67  BPM     RCRI  0  , 3.9 % Risk of MACE    Cardiac  HTN - propranolol HOLD 24 hours prior to surgery   HLD -  continue rosuvastatin DOS   Hematology       Patient instructed to ambulate as soon as possible postoperatively to decrease thromboembolic risk.      Initiate mechanical DVT prophylaxis as soon as possible and initiate chemical prophylaxis when deemed safe from a bleeding standpoint post surgery.       VTE prophylaxis per surgical team   GI  UC - continue mesalamine night prior     Tests ordered in PAT: cbc, bmp, EKG   LABS REVIEWED: unremarkable     Risk assessment complete.  Patient is scheduled for a low surgical risk procedure.        Preoperative medication instructions were provided and reviewed with the patient.  Any additional testing or evaluation was explained to the patient.  Nothing by mouth instructions were discussed and patient's questions were answered prior to conclusion to this encounter.  Patient verbalized understanding of preoperative instructions given in preadmission testing; discharge instructions available in EMR.    This note was dictated by a speech recognition.  Minor errors may have been detected in a speech recognition.

## 2024-02-12 NOTE — PREPROCEDURE INSTRUCTIONS
Medication List            Accurate as of February 12, 2024  8:13 AM. Always use your most recent med list.                acetaminophen 500 mg tablet  Commonly known as: Tylenol  Notes to patient: Use if needed morning of surgery     amoxicillin 500 mg tablet  Commonly known as: Amoxil  Notes to patient: Used for dental work     cholecalciferol 50 mcg (2,000 unit) capsule  Commonly known as: Vitamin D-3  Medication Adjustments for Surgery: Continue until night before surgery     mesalamine 1.2 gram EC tablet  Commonly known as: Lialda  Notes to patient: Continue night prior to surgery     propranolol LA 80 mg 24 hr capsule  Commonly known as: Inderal LA  Take 1 capsule (80 mg) by mouth once daily. Do not crush, chew, or split.  Notes to patient: HOLD 24 hours prior to surgery        rosuvastatin 10 mg tablet  Commonly known as: Crestor  Take 1 tablet (10 mg) by mouth once daily. ----DUE FOR LABS  Medication Adjustments for Surgery: Take morning of surgery with sip of water, no other fluids                 CONTACT SURGEON'S OFFICE IF YOU DEVELOP:  * Fever = 100.4 F   * New respiratory symptoms (e.g. cough, shortness of breath, respiratory distress, sore throat)  * Recent loss of taste or smell  *Flu like symptoms such as headache, fatigue or gastrointestinal symptoms  * You develop any open sores, shingles, burning or painful urination   AND/OR:  * You no longer wish to have the surgery.  * Any other personal circumstances change that may lead to the need to cancel or defer this surgery.  *You were admitted to any hospital within one week of your planned procedure.    SMOKING:  *Quitting smoking can make a huge difference to your health and recovery from surgery.    *If you need help with quitting, call 4-800-QUIT-NOW.    THE DAY BEFORE SURGERY:  *Do not eat any food after midnight the night before surgery/procedure.   *You are permitted to drink clear liquids (i.e. water, black coffee/tea, (no milk or cream)  apple juice, and electrolyte drinks (Gatorade)10 ounces up to 2 hours before your instructed arrival time to the hospital.  *You may chew gum until  2 hours before your surgery/procedure.    SURGICAL TIME  *You will be contacted between 2 p.m. and 6 p.m. the business day before your surgery with your arrival time.  *If you haven't received a call by 6pm, call [phone].  *Scheduled surgery times may change and you will be notified if this occurs-check your personal voicemail for any updates.    ON THE MORNING OF SURGERY:  *Wear comfortable, loose fitting clothing.   *Do not use moisturizers, creams, lotions or perfume.  *All jewelry and valuables should be left at home.  *Prosthetic devices such as contact lenses, hearing aids, dentures, eyelash extensions, hairpins and body piercing must be removed before surgery.    BRING WITH YOU:  *Photo ID and insurance card  *Current list of medicines and allergies  *Pacemaker/Defibrillator/Heart stent cards  *CPAP machine and mask  *Slings/splints/crutches  *Copy of your complete Advanced Directive/DHPOA-if applicable  *Neurostimulator implant remote    PARKING AND ARRIVAL:  *Check in at the Main Entrance desk and let them know you are here for surgery.  *You will be directed to the 2nd floor surgical waiting area.    AFTER OUTPATIENT SURGERY:  *A responsible adult MUST accompany you at the time of discharge and stay with you for 24 hours after your surgery.  *You may NOT drive yourself home after surgery.  *You may use a taxi or ride sharing service ([employer], [employer]) to return home ONLY if you are accompanied by a friend or family member.  *Instructions for resuming your medications will be provided by your surgeon.

## 2024-02-13 DIAGNOSIS — I10 PRIMARY HYPERTENSION: ICD-10-CM

## 2024-02-13 LAB
ATRIAL RATE: 67 BPM
P AXIS: 69 DEGREES
P OFFSET: 181 MS
P ONSET: 138 MS
PR INTERVAL: 162 MS
Q ONSET: 219 MS
QRS COUNT: 11 BEATS
QRS DURATION: 74 MS
QT INTERVAL: 420 MS
QTC CALCULATION(BAZETT): 443 MS
QTC FREDERICIA: 436 MS
R AXIS: 28 DEGREES
T AXIS: 26 DEGREES
T OFFSET: 429 MS
VENTRICULAR RATE: 67 BPM

## 2024-02-14 RX ORDER — ROSUVASTATIN CALCIUM 10 MG/1
10 TABLET, COATED ORAL DAILY
Qty: 90 TABLET | Refills: 0 | Status: SHIPPED | OUTPATIENT
Start: 2024-02-14 | End: 2024-02-16 | Stop reason: SDUPTHER

## 2024-02-15 ENCOUNTER — HOSPITAL ENCOUNTER (OUTPATIENT)
Dept: RADIOLOGY | Facility: CLINIC | Age: 59
Discharge: HOME | End: 2024-02-15
Payer: COMMERCIAL

## 2024-02-15 ENCOUNTER — ANESTHESIA (OUTPATIENT)
Dept: OPERATING ROOM | Facility: HOSPITAL | Age: 59
End: 2024-02-15
Payer: COMMERCIAL

## 2024-02-15 ENCOUNTER — HOSPITAL ENCOUNTER (OUTPATIENT)
Facility: HOSPITAL | Age: 59
Setting detail: OUTPATIENT SURGERY
Discharge: HOME | End: 2024-02-15
Attending: SURGERY | Admitting: SURGERY
Payer: COMMERCIAL

## 2024-02-15 ENCOUNTER — ANESTHESIA EVENT (OUTPATIENT)
Dept: OPERATING ROOM | Facility: HOSPITAL | Age: 59
End: 2024-02-15
Payer: COMMERCIAL

## 2024-02-15 VITALS
RESPIRATION RATE: 18 BRPM | HEART RATE: 80 BPM | TEMPERATURE: 97.2 F | SYSTOLIC BLOOD PRESSURE: 132 MMHG | WEIGHT: 211.64 LBS | BODY MASS INDEX: 36.13 KG/M2 | OXYGEN SATURATION: 95 % | HEIGHT: 64 IN | DIASTOLIC BLOOD PRESSURE: 58 MMHG

## 2024-02-15 DIAGNOSIS — R92.8 OTHER ABNORMAL AND INCONCLUSIVE FINDINGS ON DIAGNOSTIC IMAGING OF BREAST: ICD-10-CM

## 2024-02-15 DIAGNOSIS — D05.12 DUCTAL CARCINOMA IN SITU (DCIS) OF LEFT BREAST: Primary | ICD-10-CM

## 2024-02-15 PROCEDURE — 2720000007 HC OR 272 NO HCPCS: Performed by: SURGERY

## 2024-02-15 PROCEDURE — 88341 IMHCHEM/IMCYTCHM EA ADD ANTB: CPT | Performed by: PATHOLOGY

## 2024-02-15 PROCEDURE — 3700000002 HC GENERAL ANESTHESIA TIME - EACH INCREMENTAL 1 MINUTE: Performed by: SURGERY

## 2024-02-15 PROCEDURE — 88342 IMHCHEM/IMCYTCHM 1ST ANTB: CPT | Performed by: PATHOLOGY

## 2024-02-15 PROCEDURE — 3600000003 HC OR TIME - INITIAL BASE CHARGE - PROCEDURE LEVEL THREE: Performed by: SURGERY

## 2024-02-15 PROCEDURE — 3700000001 HC GENERAL ANESTHESIA TIME - INITIAL BASE CHARGE: Performed by: SURGERY

## 2024-02-15 PROCEDURE — 2500000005 HC RX 250 GENERAL PHARMACY W/O HCPCS: Performed by: SURGERY

## 2024-02-15 PROCEDURE — 7100000001 HC RECOVERY ROOM TIME - INITIAL BASE CHARGE: Performed by: SURGERY

## 2024-02-15 PROCEDURE — 7100000002 HC RECOVERY ROOM TIME - EACH INCREMENTAL 1 MINUTE: Performed by: SURGERY

## 2024-02-15 PROCEDURE — 3600000008 HC OR TIME - EACH INCREMENTAL 1 MINUTE - PROCEDURE LEVEL THREE: Performed by: SURGERY

## 2024-02-15 PROCEDURE — 19301 PARTIAL MASTECTOMY: CPT | Performed by: SURGERY

## 2024-02-15 PROCEDURE — A19301 PR MASTECTOMY, PARTIAL: Performed by: NURSE ANESTHETIST, CERTIFIED REGISTERED

## 2024-02-15 PROCEDURE — 2500000005 HC RX 250 GENERAL PHARMACY W/O HCPCS: Performed by: NURSE ANESTHETIST, CERTIFIED REGISTERED

## 2024-02-15 PROCEDURE — 76098 X-RAY EXAM SURGICAL SPECIMEN: CPT

## 2024-02-15 PROCEDURE — A19301 PR MASTECTOMY, PARTIAL: Performed by: STUDENT IN AN ORGANIZED HEALTH CARE EDUCATION/TRAINING PROGRAM

## 2024-02-15 PROCEDURE — 7100000010 HC PHASE TWO TIME - EACH INCREMENTAL 1 MINUTE: Performed by: SURGERY

## 2024-02-15 PROCEDURE — 2500000004 HC RX 250 GENERAL PHARMACY W/ HCPCS (ALT 636 FOR OP/ED): Performed by: SURGERY

## 2024-02-15 PROCEDURE — 88307 TISSUE EXAM BY PATHOLOGIST: CPT | Mod: TC,SUR,AHULAB | Performed by: SURGERY

## 2024-02-15 PROCEDURE — 88307 TISSUE EXAM BY PATHOLOGIST: CPT | Performed by: PATHOLOGY

## 2024-02-15 PROCEDURE — 76098 X-RAY EXAM SURGICAL SPECIMEN: CPT | Performed by: STUDENT IN AN ORGANIZED HEALTH CARE EDUCATION/TRAINING PROGRAM

## 2024-02-15 PROCEDURE — 7100000009 HC PHASE TWO TIME - INITIAL BASE CHARGE: Performed by: SURGERY

## 2024-02-15 PROCEDURE — 2500000004 HC RX 250 GENERAL PHARMACY W/ HCPCS (ALT 636 FOR OP/ED): Performed by: NURSE ANESTHETIST, CERTIFIED REGISTERED

## 2024-02-15 RX ORDER — LABETALOL HYDROCHLORIDE 5 MG/ML
5 INJECTION, SOLUTION INTRAVENOUS ONCE AS NEEDED
Status: DISCONTINUED | OUTPATIENT
Start: 2024-02-15 | End: 2024-02-15 | Stop reason: HOSPADM

## 2024-02-15 RX ORDER — LIDOCAINE HYDROCHLORIDE 10 MG/ML
0.1 INJECTION, SOLUTION EPIDURAL; INFILTRATION; INTRACAUDAL; PERINEURAL ONCE
Status: DISCONTINUED | OUTPATIENT
Start: 2024-02-15 | End: 2024-02-15 | Stop reason: HOSPADM

## 2024-02-15 RX ORDER — DEXAMETHASONE SODIUM PHOSPHATE 4 MG/ML
INJECTION, SOLUTION INTRA-ARTICULAR; INTRALESIONAL; INTRAMUSCULAR; INTRAVENOUS; SOFT TISSUE AS NEEDED
Status: DISCONTINUED | OUTPATIENT
Start: 2024-02-15 | End: 2024-02-15

## 2024-02-15 RX ORDER — LIDOCAINE HYDROCHLORIDE 20 MG/ML
INJECTION, SOLUTION INFILTRATION; PERINEURAL AS NEEDED
Status: DISCONTINUED | OUTPATIENT
Start: 2024-02-15 | End: 2024-02-15

## 2024-02-15 RX ORDER — GLYCOPYRROLATE 0.2 MG/ML
INJECTION INTRAMUSCULAR; INTRAVENOUS AS NEEDED
Status: DISCONTINUED | OUTPATIENT
Start: 2024-02-15 | End: 2024-02-15

## 2024-02-15 RX ORDER — ONDANSETRON HYDROCHLORIDE 2 MG/ML
INJECTION, SOLUTION INTRAVENOUS AS NEEDED
Status: DISCONTINUED | OUTPATIENT
Start: 2024-02-15 | End: 2024-02-15

## 2024-02-15 RX ORDER — SODIUM CHLORIDE 9 MG/ML
100 INJECTION, SOLUTION INTRAVENOUS CONTINUOUS
Status: DISCONTINUED | OUTPATIENT
Start: 2024-02-15 | End: 2024-02-15 | Stop reason: HOSPADM

## 2024-02-15 RX ORDER — OXYCODONE HYDROCHLORIDE 5 MG/1
5 TABLET ORAL EVERY 4 HOURS PRN
Status: DISCONTINUED | OUTPATIENT
Start: 2024-02-15 | End: 2024-02-15 | Stop reason: HOSPADM

## 2024-02-15 RX ORDER — MIDAZOLAM HYDROCHLORIDE 1 MG/ML
INJECTION, SOLUTION INTRAMUSCULAR; INTRAVENOUS AS NEEDED
Status: DISCONTINUED | OUTPATIENT
Start: 2024-02-15 | End: 2024-02-15

## 2024-02-15 RX ORDER — ACETAMINOPHEN 160 MG/5ML
1000 SUSPENSION ORAL ONCE
Status: DISCONTINUED | OUTPATIENT
Start: 2024-02-15 | End: 2024-02-15

## 2024-02-15 RX ORDER — PROPOFOL 10 MG/ML
INJECTION, EMULSION INTRAVENOUS AS NEEDED
Status: DISCONTINUED | OUTPATIENT
Start: 2024-02-15 | End: 2024-02-15

## 2024-02-15 RX ORDER — CEFAZOLIN SODIUM 2 G/100ML
2 INJECTION, SOLUTION INTRAVENOUS ONCE
Status: DISCONTINUED | OUTPATIENT
Start: 2024-02-15 | End: 2024-02-15 | Stop reason: HOSPADM

## 2024-02-15 RX ORDER — DIPHENHYDRAMINE HYDROCHLORIDE 50 MG/ML
12.5 INJECTION INTRAMUSCULAR; INTRAVENOUS ONCE AS NEEDED
Status: DISCONTINUED | OUTPATIENT
Start: 2024-02-15 | End: 2024-02-15 | Stop reason: HOSPADM

## 2024-02-15 RX ORDER — FENTANYL CITRATE 50 UG/ML
INJECTION, SOLUTION INTRAMUSCULAR; INTRAVENOUS AS NEEDED
Status: DISCONTINUED | OUTPATIENT
Start: 2024-02-15 | End: 2024-02-15

## 2024-02-15 RX ORDER — ONDANSETRON HYDROCHLORIDE 2 MG/ML
4 INJECTION, SOLUTION INTRAVENOUS ONCE AS NEEDED
Status: DISCONTINUED | OUTPATIENT
Start: 2024-02-15 | End: 2024-02-15 | Stop reason: HOSPADM

## 2024-02-15 RX ORDER — ACETAMINOPHEN 325 MG/1
975 TABLET ORAL ONCE
Status: COMPLETED | OUTPATIENT
Start: 2024-02-15 | End: 2024-02-15

## 2024-02-15 RX ORDER — CEFAZOLIN 1 G/1
INJECTION, POWDER, FOR SOLUTION INTRAVENOUS AS NEEDED
Status: DISCONTINUED | OUTPATIENT
Start: 2024-02-15 | End: 2024-02-15

## 2024-02-15 RX ORDER — SODIUM CHLORIDE, SODIUM LACTATE, POTASSIUM CHLORIDE, CALCIUM CHLORIDE 600; 310; 30; 20 MG/100ML; MG/100ML; MG/100ML; MG/100ML
INJECTION, SOLUTION INTRAVENOUS CONTINUOUS PRN
Status: DISCONTINUED | OUTPATIENT
Start: 2024-02-15 | End: 2024-02-15

## 2024-02-15 RX ORDER — SODIUM CHLORIDE, SODIUM LACTATE, POTASSIUM CHLORIDE, CALCIUM CHLORIDE 600; 310; 30; 20 MG/100ML; MG/100ML; MG/100ML; MG/100ML
100 INJECTION, SOLUTION INTRAVENOUS CONTINUOUS
Status: DISCONTINUED | OUTPATIENT
Start: 2024-02-15 | End: 2024-02-15 | Stop reason: HOSPADM

## 2024-02-15 RX ADMIN — LIDOCAINE HYDROCHLORIDE 40 MG: 20 INJECTION, SOLUTION INFILTRATION; PERINEURAL at 09:59

## 2024-02-15 RX ADMIN — GLYCOPYRROLATE 0.2 MG: 0.2 INJECTION INTRAMUSCULAR; INTRAVENOUS at 09:45

## 2024-02-15 RX ADMIN — PROPOFOL 200 MG: 10 INJECTION, EMULSION INTRAVENOUS at 09:59

## 2024-02-15 RX ADMIN — FENTANYL CITRATE 50 MCG: 50 INJECTION, SOLUTION INTRAMUSCULAR; INTRAVENOUS at 09:59

## 2024-02-15 RX ADMIN — ONDANSETRON 4 MG: 2 INJECTION, SOLUTION INTRAMUSCULAR; INTRAVENOUS at 09:59

## 2024-02-15 RX ADMIN — ACETAMINOPHEN 975 MG: 325 TABLET ORAL at 12:44

## 2024-02-15 RX ADMIN — SODIUM CHLORIDE, POTASSIUM CHLORIDE, SODIUM LACTATE AND CALCIUM CHLORIDE: 600; 310; 30; 20 INJECTION, SOLUTION INTRAVENOUS at 09:48

## 2024-02-15 RX ADMIN — DEXAMETHASONE SODIUM PHOSPHATE 8 MG: 4 INJECTION, SOLUTION INTRA-ARTICULAR; INTRALESIONAL; INTRAMUSCULAR; INTRAVENOUS; SOFT TISSUE at 09:59

## 2024-02-15 RX ADMIN — FENTANYL CITRATE 50 MCG: 50 INJECTION, SOLUTION INTRAMUSCULAR; INTRAVENOUS at 10:30

## 2024-02-15 RX ADMIN — MIDAZOLAM HYDROCHLORIDE 2 MG: 1 INJECTION, SOLUTION INTRAMUSCULAR; INTRAVENOUS at 09:45

## 2024-02-15 RX ADMIN — CEFAZOLIN 2 G: 330 INJECTION, POWDER, FOR SOLUTION INTRAMUSCULAR; INTRAVENOUS at 10:00

## 2024-02-15 ASSESSMENT — PAIN - FUNCTIONAL ASSESSMENT
PAIN_FUNCTIONAL_ASSESSMENT: 0-10

## 2024-02-15 ASSESSMENT — PAIN SCALES - GENERAL
PAINLEVEL_OUTOF10: 0 - NO PAIN
PAINLEVEL_OUTOF10: 3
PAINLEVEL_OUTOF10: 0 - NO PAIN
PAINLEVEL_OUTOF10: 0 - NO PAIN

## 2024-02-15 ASSESSMENT — COLUMBIA-SUICIDE SEVERITY RATING SCALE - C-SSRS
2. HAVE YOU ACTUALLY HAD ANY THOUGHTS OF KILLING YOURSELF?: NO
6. HAVE YOU EVER DONE ANYTHING, STARTED TO DO ANYTHING, OR PREPARED TO DO ANYTHING TO END YOUR LIFE?: NO
1. IN THE PAST MONTH, HAVE YOU WISHED YOU WERE DEAD OR WISHED YOU COULD GO TO SLEEP AND NOT WAKE UP?: NO

## 2024-02-15 NOTE — ANESTHESIA PROCEDURE NOTES
Airway  Date/Time: 2/15/2024 10:03 AM  Urgency: elective    Airway not difficult    Staffing  Performed: CRNA   Authorized by: Pelon Michaud MD    Performed by: SHAYLEE Patel-BETH, DNP  Patient location during procedure: OR    Indications and Patient Condition  Indications for airway management: anesthesia and airway protection  Spontaneous Ventilation: absent  Sedation level: deep  Preoxygenated: yes  Mask difficulty assessment: 1 - vent by mask    Final Airway Details  Final airway type: supraglottic airway      Successful airway: classic  Size 5     Number of attempts at approach: 1

## 2024-02-15 NOTE — ANESTHESIA POSTPROCEDURE EVALUATION
"Patient: Heidi Quiroz \"Chelita\"    Procedure Summary       Date: 02/15/24 Room / Location: [facility] 03 / [facility] OR    Anesthesia Start: 0948 Anesthesia Stop: 1128    Procedure: Left Breast Magseed Localized Partial Mastectomy (Left: Breast) Diagnosis:       Ductal carcinoma in situ (DCIS) of left breast      (Ductal carcinoma in situ (DCIS) of left breast [D05.12])    Surgeons: Samia Banda MD Responsible Provider: Pelon Michaud MD    Anesthesia Type: general ASA Status: 3            Anesthesia Type: general    Vitals Value Taken Time   /70 02/15/24 1147   Temp 36 °C (96.8 °F) 02/15/24 1123   Pulse 74 02/15/24 1148   Resp 12 02/15/24 1145   SpO2 98 % 02/15/24 1148   Vitals shown include unvalidated device data.    Anesthesia Post Evaluation    Patient location during evaluation: bedside  Patient participation: complete - patient participated  Level of consciousness: awake  Pain management: adequate  Multimodal analgesia pain management approach  Airway patency: patent  Cardiovascular status: stable  Respiratory status: spontaneous ventilation and unassisted  Hydration status: acceptable  Postoperative Nausea and Vomiting: none  Comments: No significant PONV.        No notable events documented.    "

## 2024-02-15 NOTE — OP NOTE
"Left Breast Magseed Localized Partial Mastectomy (L) Operative Note     Date: 2/15/2024  OR Location: [facility]    Name: Heidi Quiroz \"Chelita\", : 1965, Age: 58 y.o., MRN: [id number], Sex: female    Diagnosis  Pre-op Diagnosis     * Ductal carcinoma in situ (DCIS) of left breast [D05.12] Post-op Diagnosis     * Ductal carcinoma in situ (DCIS) of left breast [D05.12]     Procedures  Left Breast Magseed Localized Partial Mastectomy  86725 - [place] MASTECTOMY PARTIAL      Surgeons      * Samia Banda - Primary    Resident/Fellow/Other Assistant:  Surgeon(s) and Role:    Procedure Summary  Anesthesia: General  ASA: III  Anesthesia Staff: Anesthesiologist: Pelon Michaud MD  CRNA: Fanny Glass APRN-CRNA, DNP; DONTA Gonzales  Estimated Blood Loss: 5 mL  Intra-op Medications:   Administrations occurring from 0930 to 1130 on 02/15/24:   Medication Name Total Dose   bupivacaine PF (Marcaine) 0.25 % (2.5 mg/mL) 20 mL, lidocaine (Xylocaine) 20 mL syringe 30 mL              Anesthesia Record               Intraprocedure I/O Totals       None           Specimen:   ID Type Source Tests Collected by Time   1 : LEFT BREAST MAGSEED LOCALIZED PARTIAL MASTECTOMY Tissue BREAST MASTECTOMY LEFT SURGICAL PATHOLOGY EXAM Samia Banda MD 2/15/2024 1038   2 : LEFT ANTERIOR BREAST MARGIN Tissue BREAST MARGIN LEFT SURGICAL PATHOLOGY EXAM Samia Banda MD 2/15/2024 1039   3 : LEFT INFERIOR BREAST MARGIN Tissue BREAST MARGIN LEFT SURGICAL PATHOLOGY EXAM Samia Banda MD 2/15/2024 1039   4 : LEFT LATERAL BREAST MARGIN Tissue BREAST MARGIN LEFT SURGICAL PATHOLOGY EXAM Samia Banda MD 2/15/2024 1039   5 : LEFT MEDIAL BREAST MARGIN Tissue BREAST MARGIN LEFT SURGICAL PATHOLOGY EXAM Samia Banda MD 2/15/2024 1039   6 : LEFT POSTERIOR BREAST MARGIN Tissue BREAST MARGIN LEFT SURGICAL PATHOLOGY EXAM Samia Banda MD 2/15/2024 1039   7 : LEFT SUPERIOR BREAST MARGIN Tissue BREAST MARGIN LEFT SURGICAL PATHOLOGY EXAM Samia Banda MD " 2/15/2024 1039        Staff:   Circulator: Razia Bhat, REBECCA; Eufemia Quintero RN  Scrub Person: Nano Jair         Drains and/or Catheters: * None in log *    Tourniquet Times:         Implants:     Findings: clip and mag seed in specimen xray    Indications: Chelita Quiroz is an 58 y.o. female who is having surgery for Ductal carcinoma in situ (DCIS) of left breast [D05.12].     The patient was seen in the preoperative area. The risks, benefits, complications, treatment options, non-operative alternatives, expected recovery and outcomes were discussed with the patient. The possibilities of reaction to medication, pulmonary aspiration, injury to surrounding structures, bleeding, recurrent infection, the need for additional procedures, failure to diagnose a condition, and creating a complication requiring transfusion or operation were discussed with the patient. The patient concurred with the proposed plan, giving informed consent.  The site of surgery was properly noted/marked if necessary per policy. The patient has been actively warmed in preoperative area. Preoperative antibiotics have been ordered and given within 1 hours of incision. Venous thrombosis prophylaxis have been ordered including bilateral sequential compression devices    Procedure Details:   Informed consent was obtained.  The surgical site marked and the Day of Surgery Update completed. The patient was taken to the operating room and positioned in a supine position on the operating room table. Anesthesia was induced without difficulty. SCDs were placed for DVT prophylaxis. Antibiotics were administered within 60 minutes prior to incision for surgical prophylaxis. Lower body Alena Hugger was applied to help maintain normothermia.     I reviewed my note and the appropriate films.  A surgical safety time-out was performed.     Using the sentimag probe, I approximated the location of the mag seed marking the lesion of concern.      The patient  was sterilely prepped and draped in the usual fashion.       I then turned my attention to the left breast. The skin and surrounding tissue was anesthetized with local anesthetic. A  circumareolar skin incision was made with a 15 blade scalpel.  Subcutaneous tissues were sharply divided down to and into subcutaneous fat using Bovie cautery.  I dissected towards the mag seed. Mag seed location was confirmed throughout using the sentimag probe. I grasped the intended specimen with an Allis clamp.  The entire area of tissue surrounding the mag seed was excised en bloc with the mag seed intact. The specimen was oriented with a short stitch superiorly and a long stitch laterally, and labeled as left breast mag seed localized partial mastectomy. The specimen was inked per the vector kit.  Specimen radiograph was taken and reviewed by the radiologist. The clip was identified in the specimen and the mag seed was intact.  Radiologically the margins appeared adequate.  The film was reviewed by radiology.     I then excised 6 cavity shave margins using the bovie and oriented the new margin in with black ink    I placed clips around the perimeter of the lumpectomy cavity and 2 clips in the posterior margin. The cavity was irrigated with sterile saline solution.  Hemostasis was achieved and confirmed with cautery.         Hemostasis was achieved with bovie.  The deep dermal layer was closed with interrupted 3-0 vicryl sutures.  The skin was closed with a running 4-0 monocryl subcuticular stitch.        A sterile dressing was applied.  A surgical bra was used for light compression.     Anesthesia was reversed and the patient was brought to the recovery room in stable condition having tolerated the procedure well.  There were no complications.  30 cc of 1% lidocaine/0.25% marcaine mixed was used throughout the case.       Complications:  None; patient tolerated the procedure well.    Disposition: PACU - hemodynamically  stable.  Condition: stable     This procedure was not performed to treat breast cancer through sentinel node biopsy      Additional Details: posterior margin above pec    Attending Attestation: I was present and scrubbed for the key portions of the procedure.    Samia Banda  Phone Number: [phone]

## 2024-02-15 NOTE — PERIOPERATIVE NURSING NOTE
1123: Pt arrived to pacu sedated without signs or symptoms of distress, breathing regular and unlabored. Bedside report received from anesthesia and OR team. Initial vital signs and assessment charted per flowsheets, Cart low, locked and side rails up. Safety maintained  1148: Family updated  1215: PO intake encouraged. Water and crackers provided  1244: Pt requesting tylenol vs oxycodone for pain. 975 Tylenol provided per order. Pt denies nausea. Safety maintained

## 2024-02-15 NOTE — DISCHARGE INSTRUCTIONS
POST-OP INSTRUCTIONS FOR BREAST SURGERY PATIENTS: DR. MACHO BANDA    Activity:    Avoid direct impact to the surgical site.  No heavy lifting (greater than 10 lbs) or strenuous activity with the arm on the surgical side until evaluated at post-op appointment.    Wound care:    Ice pack, if desired, on and off in 15 minute intervals.  A supportive bra can be worn for comfort and support.  You may start showering normally tomorrow. Don't scrub and pat the surgical site dry.  No swimming or submerging the surgical site in a hot tub or bath for 2 weeks.  Outer dressing to be removed in 2 days.  Leave steri-strips intact for 1 week.  Observe the surgical site for signs of bleeding or infection. (Some swelling or bruising is anticipated.  A small amount of blood or an air bubble beneath the dressing is not a cause for concern.)    Call physician with abnormal findings: Progressive swelling, increasing pain, bright red skin discoloration, chills or fever (Temperature 101.5 or higher).    Follow-up:  post-op appointment with Dr. Banda was previously scheduled.  Pathology report will be discussed at that visit.    Pain:    No RX given  You may also use over-the-counter medication such as Tylenol (acetaminophen) or Advil (ibuprofen).  Remember that some prescription medications contain Tylenol (acetaminophen). If you are taking a prescription medication that contains Tylenol (acetaminophen), do not take additional over-the-counter Tylenol.  All pain medications can be constipating; remember to drink plenty of fluids and use stool softener and/or laxatives as needed. These are available over-the-counter.  Pain control is best when medication is taken before pain becomes severe.    Medication refills:    Medications cannot be refilled after business hours or on weekends.      Questions / Concerns:  Call Clinic [phone], option 2.

## 2024-02-15 NOTE — ANESTHESIA PREPROCEDURE EVALUATION
"Patient: Heidi Quiroz \"Chelita\"    Procedure Information       Date/Time: 02/15/24 0930    Procedure: Left Breast Magseed Localized Partial Mastectomy (Left: Breast)    Location: [facility] OR 03 / [facility] OR    Surgeons: Samia Banda MD            Relevant Problems   Cardiovascular   (+) Hypertension   (+) Pure hypercholesterolemia      Endocrine   (+) Class 2 obesity with alveolar hypoventilation and body mass index (BMI) of 35.0 to 35.9 in adult (CMS/HCC)   (+) Hyperthyroidism   (+) Multinodular thyroid      GI   (+) Ulcerative colitis (CMS/HCC)      Neuro/Psych   (+) Adult situational stress disorder   (+) Sciatica of left side      Hematology   (+) Anemia      Eyes, Ears, Nose, and Throat   (+) Bilateral sensorineural hearing loss       Clinical information reviewed:   Tobacco  Allergies  Meds   Med Hx  Surg Hx   Fam Hx  Soc Hx        NPO Detail:  NPO/Void Status  Date of Last Liquid: 02/15/24  Time of Last Liquid: 0615  Date of Last Solid: 02/14/24  Time of Last Solid: 2100  Last Intake Type: Clear fluids  Time of Last Void: 0749         Physical Exam    Airway  Mallampati: II  TM distance: >3 FB  Neck ROM: full     Cardiovascular   Rhythm: regular  Rate: normal     Dental    Pulmonary   Breath sounds clear to auscultation     Abdominal            Anesthesia Plan    History of general anesthesia?: yes  History of complications of general anesthesia?: no    ASA 3     general     intravenous induction   Anesthetic plan and risks discussed with patient.    Plan discussed with CRNA.      "

## 2024-02-16 DIAGNOSIS — I10 PRIMARY HYPERTENSION: ICD-10-CM

## 2024-02-16 RX ORDER — ROSUVASTATIN CALCIUM 20 MG/1
20 TABLET, COATED ORAL DAILY
Qty: 90 TABLET | Refills: 1 | Status: SHIPPED | OUTPATIENT
Start: 2024-02-16

## 2024-02-22 LAB
LAB AP ASR DISCLAIMER: NORMAL
LABORATORY COMMENT REPORT: NORMAL
PATH REPORT.FINAL DX SPEC: NORMAL
PATH REPORT.GROSS SPEC: NORMAL
PATH REPORT.RELEVANT HX SPEC: NORMAL
PATH REPORT.TOTAL CANCER: NORMAL
PATHOLOGY SYNOPTIC REPORT: NORMAL

## 2024-02-23 ENCOUNTER — PREP FOR PROCEDURE (OUTPATIENT)
Dept: SURGICAL ONCOLOGY | Facility: HOSPITAL | Age: 59
End: 2024-02-23
Payer: COMMERCIAL

## 2024-02-23 ENCOUNTER — TELEPHONE (OUTPATIENT)
Dept: OBSTETRICS AND GYNECOLOGY | Facility: CLINIC | Age: 59
End: 2024-02-23
Payer: COMMERCIAL

## 2024-02-23 DIAGNOSIS — D05.12 DUCTAL CARCINOMA IN SITU (DCIS) OF LEFT BREAST: Primary | ICD-10-CM

## 2024-02-23 RX ORDER — SODIUM CHLORIDE 9 MG/ML
100 INJECTION, SOLUTION INTRAVENOUS CONTINUOUS
Status: CANCELLED | OUTPATIENT
Start: 2024-02-23

## 2024-02-23 NOTE — H&P (VIEW-ONLY)
Chief Complaint  Left breast DCIS, ER neg  Post op      History of Present Illness    Referring Provider: BRITANY Rapp  PCP BRITANY Hare DO    59 yo Ashkenazi Hinduism,  female here with New left breast DCIS, ER negative.  S/p left PM 2/15/2024    History:  1) No abnormal mammograms, breast biopsies, or breast surgeries.  2) 2022.  Bilateral mammogram.  Scattered fibroglandular tissue bilaterally.  BI-RADS 1.  3) 2023.  Scattered fibroglandular tissue.  Right breast negative.  Left breast upper outer calcifications.  Left breast asymmetry, BI-RADS 0.  4) 2023.  Left breast diagnostic imaging.  Left breast indeterminate calcifications are confirmed.  Left breast asymmetry resolves.  Targeted ultrasound was performed.  No suspicious findings.  BI-RADS 4.  Left breast stereotactic biopsy of calcifications is recommended.  5) 2024.Left breast calcifications biopsy.  High-grade DCIS, ER negative.  Dumbbell Tri Jesse clip is in good position.  Calcifications span approximately 1 cm on mammogram.  6) 2/15/2024. Left PM 3.6 cm grade 3 DCIS, close anterior, lateral, posterior margins      She presents today for post op  No breast concerns    Ob/gyn history:  Menarche 12  Menopause 49  , age of first delivery 29  no OCPs, no fertility treatments, no HRT    Mother with lung cancer at 72  Father with liver cancer at 62  Brother with lung cancer at 63  Brother with lung cancer at 66    Review of systems  A comprehensive ROS was taken on the patient intake form and reviewed with the patient. This form is scanned into the electronic medical record.    Constitutional symptoms: Denies generalized fatigue. Denies weight change, fevers/chills, difficulty sleeping   Eyes: Denies double vision, glaucoma, cataracts.  Ear/nose/throat/mouth: Denies hearing changes, sore throat, sinus problems.  Cardiovascular: No chest pain. Denies irregular heartbeat. Denies ankle swelling.  Respiratory: No wheezing,  cough, or shortness of breath.  Gastrointestinal: No abdominal pain, No nausea/vomiting. No indigestion/heartburn. No change in bowel habits. No constipation or diarrhea.   Genitourinary: No urinary incontinence. No urinary frequency. No painful urination.  Musculoskeletal: No bone pain, no muscle pain, no joint pain.   Integumentary: No rash. No masses. No changes in moles. No easy bruising.  Neurological: No headaches. No tremors. No numbness/tingling.  Psychiatric: No anxiety. No depression.  Endocrine: No excessive thirst. Not too hot or too cold. Not tired or fatigued.   Hematological/lymphatic: No swollen glands or blood clotting problems. No bruising.     Physical exam  A chaperone was offered for all portions of the physical exam. The patient declined.     General appearance: appears stated age, alert and oriented x 3  Head: Normocephalic, atraumatic  Eyes: conjunctivae/corneas clear.  Ears: External ears are normal, hearing is grossly intact.  Lungs: normal breathing  Heart: regular   Abdomen: Soft, nontender, nondistended.  Neurologic: grossly normal  Lymph nodes: No cervical, supraclavicular or axillary lymphadenopathy bilaterally    Breast: A comprehensive breast exam was performed in the seated and supine positions. Breasts are symmetrical. Bilateral nipples are everted. There are no skin changes on arm maneuvers. Bra size: 44D   Right: No masses   Left: healing w circumareolar incision. No masses. No erythema.     Results  Imaging  All breast imaging personally reviewed by me.  See HPI    Pathology  January 22, 2024.Left breast calcifications biopsy.  High-grade DCIS, ER negative.  Dumbbell Tri Jesse clip is in good position.  Calcifications span approximately 1 cm on mammogram.    Impression:   1) 59 yo Ashkenazi Oriental orthodox,  female here with New left breast DCIS, ER negative.  Now s/p left PM, 3.6 cm grade 3 DCIS close margins.   2) Co-morbidities include hypertension, hyperlipidemia..  Non-smoker  3) No family history of breast or ovarian cancer.  Referred to genetics.      Plan:   She is here alone today.  She is recovering well from surgery.  We reviewed her pathology report and she was given a copy.  She is happy to hear that only DCIS was noted.  Unfortunately it was a little bit more extensive than imaging initially suggested.  There were multiple close margins.  I have advised a reexcision.    When I perform a re-excision I explained that I take additional tissue at the area of the close or positive margin. At the time of surgery, once again, I cannot see or feel if there are any cancer cells at the edge of the tissue and it has to be examined under the microscope. Every time I perform a re-excision there are 3 possibilities: 1) no residual disease, 2) residual disease, but a better margin (I would prefer 2 mm or greater) or 3) residual disease, but the margin is still close/positive. The first two possibilities would mean that the disease is completely removed and we can proceed with additional treatments. If the final possibility occurs, we would have to determine if yet another re-excision could be done or consider mastectomy.    She wishes to proceed with reexcision.  Surgery is scheduled.  Consent signed today.  She will follow-up about 2 weeks after surgery to review her pathology report.  She is to call the office with any sooner concerns.

## 2024-02-23 NOTE — TELEPHONE ENCOUNTER
Pt called and has been bleeding while wiping after using the bathroom. Its not every time. No uti symptoms. She feels dry and itchy in the vaginal area. Please advise thank you      Pt stated this has been going on for a few weeks now also she was diagnosed ductal carcinoma sit2. And had a lumpectomy.    Sherrie Paz MA

## 2024-02-23 NOTE — PROGRESS NOTES
Chief Complaint  Left breast DCIS, ER neg  Post op      History of Present Illness    Referring Provider: BRITANY Rapp  PCP BRITANY Hare DO    57 yo Ashkenazi Christian,  female here with New left breast DCIS, ER negative.  S/p left PM 2/15/2024    History:  1) No abnormal mammograms, breast biopsies, or breast surgeries.  2) 2022.  Bilateral mammogram.  Scattered fibroglandular tissue bilaterally.  BI-RADS 1.  3) 2023.  Scattered fibroglandular tissue.  Right breast negative.  Left breast upper outer calcifications.  Left breast asymmetry, BI-RADS 0.  4) 2023.  Left breast diagnostic imaging.  Left breast indeterminate calcifications are confirmed.  Left breast asymmetry resolves.  Targeted ultrasound was performed.  No suspicious findings.  BI-RADS 4.  Left breast stereotactic biopsy of calcifications is recommended.  5) 2024.Left breast calcifications biopsy.  High-grade DCIS, ER negative.  Dumbbell Tri Jesse clip is in good position.  Calcifications span approximately 1 cm on mammogram.  6) 2/15/2024. Left PM 3.6 cm grade 3 DCIS, close anterior, lateral, posterior margins      She presents today for post op  No breast concerns    Ob/gyn history:  Menarche 12  Menopause 49  , age of first delivery 29  no OCPs, no fertility treatments, no HRT    Mother with lung cancer at 72  Father with liver cancer at 62  Brother with lung cancer at 63  Brother with lung cancer at 66    Review of systems  A comprehensive ROS was taken on the patient intake form and reviewed with the patient. This form is scanned into the electronic medical record.    Constitutional symptoms: Denies generalized fatigue. Denies weight change, fevers/chills, difficulty sleeping   Eyes: Denies double vision, glaucoma, cataracts.  Ear/nose/throat/mouth: Denies hearing changes, sore throat, sinus problems.  Cardiovascular: No chest pain. Denies irregular heartbeat. Denies ankle swelling.  Respiratory: No wheezing,  cough, or shortness of breath.  Gastrointestinal: No abdominal pain, No nausea/vomiting. No indigestion/heartburn. No change in bowel habits. No constipation or diarrhea.   Genitourinary: No urinary incontinence. No urinary frequency. No painful urination.  Musculoskeletal: No bone pain, no muscle pain, no joint pain.   Integumentary: No rash. No masses. No changes in moles. No easy bruising.  Neurological: No headaches. No tremors. No numbness/tingling.  Psychiatric: No anxiety. No depression.  Endocrine: No excessive thirst. Not too hot or too cold. Not tired or fatigued.   Hematological/lymphatic: No swollen glands or blood clotting problems. No bruising.     Physical exam  A chaperone was offered for all portions of the physical exam. The patient declined.     General appearance: appears stated age, alert and oriented x 3  Head: Normocephalic, atraumatic  Eyes: conjunctivae/corneas clear.  Ears: External ears are normal, hearing is grossly intact.  Lungs: normal breathing  Heart: regular   Abdomen: Soft, nontender, nondistended.  Neurologic: grossly normal  Lymph nodes: No cervical, supraclavicular or axillary lymphadenopathy bilaterally    Breast: A comprehensive breast exam was performed in the seated and supine positions. Breasts are symmetrical. Bilateral nipples are everted. There are no skin changes on arm maneuvers. Bra size: 44D   Right: No masses   Left: healing w circumareolar incision. No masses. No erythema.     Results  Imaging  All breast imaging personally reviewed by me.  See HPI    Pathology  January 22, 2024.Left breast calcifications biopsy.  High-grade DCIS, ER negative.  Dumbbell Tri Jesse clip is in good position.  Calcifications span approximately 1 cm on mammogram.    Impression:   1) 59 yo Ashkenazi Druze,  female here with New left breast DCIS, ER negative.  Now s/p left PM, 3.6 cm grade 3 DCIS close margins.   2) Co-morbidities include hypertension, hyperlipidemia..  Non-smoker  3) No family history of breast or ovarian cancer.  Referred to genetics.      Plan:   She is here alone today.  She is recovering well from surgery.  We reviewed her pathology report and she was given a copy.  She is happy to hear that only DCIS was noted.  Unfortunately it was a little bit more extensive than imaging initially suggested.  There were multiple close margins.  I have advised a reexcision.    When I perform a re-excision I explained that I take additional tissue at the area of the close or positive margin. At the time of surgery, once again, I cannot see or feel if there are any cancer cells at the edge of the tissue and it has to be examined under the microscope. Every time I perform a re-excision there are 3 possibilities: 1) no residual disease, 2) residual disease, but a better margin (I would prefer 2 mm or greater) or 3) residual disease, but the margin is still close/positive. The first two possibilities would mean that the disease is completely removed and we can proceed with additional treatments. If the final possibility occurs, we would have to determine if yet another re-excision could be done or consider mastectomy.    She wishes to proceed with reexcision.  Surgery is scheduled.  Consent signed today.  She will follow-up about 2 weeks after surgery to review her pathology report.  She is to call the office with any sooner concerns.

## 2024-02-26 ENCOUNTER — OFFICE VISIT (OUTPATIENT)
Dept: PRIMARY CARE | Facility: CLINIC | Age: 59
End: 2024-02-26
Payer: COMMERCIAL

## 2024-02-26 ENCOUNTER — OFFICE VISIT (OUTPATIENT)
Dept: SURGICAL ONCOLOGY | Facility: CLINIC | Age: 59
End: 2024-02-26
Payer: COMMERCIAL

## 2024-02-26 ENCOUNTER — TELEPHONE (OUTPATIENT)
Dept: GASTROENTEROLOGY | Facility: HOSPITAL | Age: 59
End: 2024-02-26

## 2024-02-26 ENCOUNTER — APPOINTMENT (OUTPATIENT)
Dept: PRIMARY CARE | Facility: CLINIC | Age: 59
End: 2024-02-26
Payer: COMMERCIAL

## 2024-02-26 VITALS — HEART RATE: 65 BPM | DIASTOLIC BLOOD PRESSURE: 78 MMHG | OXYGEN SATURATION: 100 % | SYSTOLIC BLOOD PRESSURE: 153 MMHG

## 2024-02-26 DIAGNOSIS — R94.31 ABNORMAL EKG: Primary | ICD-10-CM

## 2024-02-26 DIAGNOSIS — E78.5 DYSLIPIDEMIA: ICD-10-CM

## 2024-02-26 DIAGNOSIS — N95.0 PMB (POSTMENOPAUSAL BLEEDING): Primary | ICD-10-CM

## 2024-02-26 DIAGNOSIS — K51.00 ULCERATIVE PANCOLITIS WITHOUT COMPLICATION (MULTI): Primary | ICD-10-CM

## 2024-02-26 DIAGNOSIS — D05.12 DUCTAL CARCINOMA IN SITU (DCIS) OF LEFT BREAST: Primary | ICD-10-CM

## 2024-02-26 DIAGNOSIS — K51.90 ULCERATIVE COLITIS, UNSPECIFIED, WITHOUT COMPLICATIONS (MULTI): ICD-10-CM

## 2024-02-26 DIAGNOSIS — N89.8 VAGINA ITCHING: Primary | ICD-10-CM

## 2024-02-26 DIAGNOSIS — N88.2 CERVICAL STENOSIS (UTERINE CERVIX): Primary | ICD-10-CM

## 2024-02-26 PROCEDURE — 1036F TOBACCO NON-USER: CPT | Performed by: SURGERY

## 2024-02-26 PROCEDURE — 3077F SYST BP >= 140 MM HG: CPT | Performed by: INTERNAL MEDICINE

## 2024-02-26 PROCEDURE — 1036F TOBACCO NON-USER: CPT | Performed by: INTERNAL MEDICINE

## 2024-02-26 PROCEDURE — 99024 POSTOP FOLLOW-UP VISIT: CPT | Performed by: SURGERY

## 2024-02-26 PROCEDURE — 93000 ELECTROCARDIOGRAM COMPLETE: CPT | Performed by: INTERNAL MEDICINE

## 2024-02-26 PROCEDURE — 3078F DIAST BP <80 MM HG: CPT | Performed by: INTERNAL MEDICINE

## 2024-02-26 PROCEDURE — 99213 OFFICE O/P EST LOW 20 MIN: CPT | Performed by: INTERNAL MEDICINE

## 2024-02-26 RX ORDER — FLUCONAZOLE 150 MG/1
150 TABLET ORAL ONCE
Qty: 2 TABLET | Refills: 1 | Status: SHIPPED | OUTPATIENT
Start: 2024-02-26 | End: 2024-02-26

## 2024-02-26 RX ORDER — MISOPROSTOL 200 UG/1
200 TABLET ORAL ONCE
Qty: 1 TABLET | Refills: 0 | Status: SHIPPED | OUTPATIENT
Start: 2024-02-26 | End: 2024-05-17 | Stop reason: HOSPADM

## 2024-02-26 RX ORDER — MESALAMINE 1.2 G/1
4.8 TABLET, DELAYED RELEASE ORAL
Qty: 360 TABLET | Refills: 3 | Status: SHIPPED | OUTPATIENT
Start: 2024-02-26 | End: 2025-02-25

## 2024-02-26 RX ORDER — MESALAMINE 1.2 G/1
4.8 TABLET, DELAYED RELEASE ORAL
Qty: 360 TABLET | Refills: 2 | Status: SHIPPED | OUTPATIENT
Start: 2024-02-26

## 2024-02-26 NOTE — TELEPHONE ENCOUNTER
I talked to the patient.  She has postmenopausal bleeding.  She does have a lumpectomy for breast cancer planned for February 28.  Reassurance was given that having the ultrasound after her breast surgery is fine.  She does currently have it scheduled for March 4.  I did recommend calling the office to schedule an endometrial biopsy.  The Cytotec was ordered.

## 2024-02-26 NOTE — TELEPHONE ENCOUNTER
I recommend scheduling a pelvic ultrasound due to the postmenopausal bleeding.  For itchiness I ordered fluconazole to the pharmacy.  After the ultrasound I would recommend an endometrial biopsy.  She will need Cytotec for the night before and I recommend 600 mg ibuprofen 1 hour before the visit.    Talked to pt and will getting surgery this wed for breast, should she get ultrasound done before or after surgery? Please advise. Thank you

## 2024-02-26 NOTE — PROGRESS NOTES
Subjective   Patient ID: Chelita Quiroz is a 58 y.o. female who presents for No chief complaint on file..        HPI   Patient is a 58-year-old female with past medical history of chronic GERD hypothyroidism hypertension cholelithiasis and hearing loss who presents for follow up.      Patient has been experiencing shortness of breath at times as well as palpitations and nonspecific chest pains since she was told that her EKG was somewhat abnormal prior to her recent operation.  Of note the went forward with the operation.    She is here to follow-up on EKG.  Pulse ox 100% on room air  Heart rate 58            Review of Systems  Constitutional: No fever or chills, No Night Sweats  Eyes: No Blurry Vision or Eye sight problems  ENT: No Nasal Discharge, Hoarseness, sore throat  Cardiovascular: no chest pain, no palpitations and no syncope.   Respiratory: no cough, no shortness of breath during exertion and no shortness of breath at rest.   Gastrointestinal: no abdominal pain, no nausea and no vomiting.   : No vaginal discharge, burning with urination, no blood in urine or stools  Skin: No Skin rashes or Lesions  Neuro: No Headache, no dizziness or Numbness or tingling  Psych: No Anxiety, depression or sleeping problems  Heme: No Easy bleeding or brusing.     Objective   /78   Pulse 65   LMP  (LMP Unknown)   SpO2 100%     Physical Exam  Patient declined Chaperone  Constitutional: Alert and in no acute distress. Well developed, well nourished.   Head and Face: Head and face: Normal.    Eyes: Normal external exam. Pupils were equal in size, round, reactive to light (PERRL) with normal accommodation and extraocular movements intact (EOMI).   Ears, Nose, Mouth, and Throat: External inspection of ears and nose: Normal.  Hearing: Normal.  Nasal mucosa, septum, and turbinates: Normal.  Lips, teeth, and gums: Normal.  Oropharynx: Normal.   Neck: No neck mass was observed. Supple. Thyroid not enlarged and there were no  palpable thyroid nodules.   Cardiovascular: Heart rate and rhythm were normal, normal S1 and S2. Pedal pulses: Normal. No peripheral edema.   Pulmonary: No respiratory distress. Clear bilateral breath sounds.   Breast: Normal Appearance, No Masses or lumps palpated  Abdomen: Soft nontender; no abdominal mass palpated. Normal bowel sounds. No organomegaly.   : Deferred   Musculoskeletal: No joint swelling seen, normal movements of all extremities. Range of motion: Normal.  Muscle strength/tone: Normal.    Skin: Normal skin color and pigmentation, normal skin turgor, and no rash.   Neurologic: Deep tendon reflexes were 2+ and symmetric.   Psychiatric: Judgment and insight: Intact. Mood and affect: Normal.  Lymphatic: No cervical lymphadenopathy. Palpation of lymph nodes in axillae: Normal.  Palpation of lymph nodes in groin: Normal.    Lab Results   Component Value Date    WBC 7.5 02/12/2024    HGB 13.1 02/12/2024    HCT 42.0 02/12/2024     02/12/2024    CHOL 165 02/12/2024    TRIG 249 (H) 02/12/2024    HDL 46.5 02/12/2024    ALT CANCELED 09/07/2023    AST CANCELED 09/07/2023     02/12/2024    K 4.3 02/12/2024     02/12/2024    CREATININE 0.71 02/12/2024    BUN 9 02/12/2024    CO2 29 02/12/2024    TSH 1.12 02/12/2024    HGBA1C 5.4 02/08/2021       BI RAD breast exam specimen  Narrative: Interpreted By:  Kiara Bland,   STUDY:  BI RAD BREAST EXAM SPECIMEN;  2/15/2024 10:46 am      ACCESSION NUMBER(S):  [id number]      ORDERING CLINICIAN:  KANDIS LERNER      INDICATION:  Left breast Magseed localization.      FINDINGS:  The specimen radiograph demonstrates the calcifications of concern,  associated biopsy marker and the Magseed in the tissues.      Impression: Status post surgical excision of Left breast Magseed localization.      MACRO:  None      Signed by: Kiara Bland 2/15/2024 12:06 PM  Dictation workstation:   [id number]      Assessment/Plan   Problem List Items Addressed This Visit     None  Visit Diagnoses       Abnormal EKG    -  Primary    Relevant Orders    ECG 12 lead (Clinic Performed)    Vitamin B12    Iron and TIBC    Dyslipidemia        Relevant Orders    Lipid Panel    Vitamin B12    Iron and TIBC          Blood pressure well-controlled on current medications.  No medication adjustments and refills provided.  Check screening labs    Abnormal EKG  EKG today shows sinus bradycardia.  No abnormality.  Continue to monitor symptoms    IBD  Continue following with gastroenterology.  Check CRP as well as CPK  We will call with results of testing    Multinodular goiter  Continue following with ENT    Follow-up 6 months

## 2024-02-28 ENCOUNTER — ANESTHESIA (OUTPATIENT)
Dept: OPERATING ROOM | Facility: HOSPITAL | Age: 59
End: 2024-02-28
Payer: COMMERCIAL

## 2024-02-28 ENCOUNTER — ANESTHESIA EVENT (OUTPATIENT)
Dept: OPERATING ROOM | Facility: HOSPITAL | Age: 59
End: 2024-02-28
Payer: COMMERCIAL

## 2024-02-28 ENCOUNTER — HOSPITAL ENCOUNTER (OUTPATIENT)
Facility: HOSPITAL | Age: 59
Setting detail: OUTPATIENT SURGERY
Discharge: HOME | End: 2024-02-28
Attending: SURGERY | Admitting: SURGERY
Payer: COMMERCIAL

## 2024-02-28 VITALS
OXYGEN SATURATION: 94 % | BODY MASS INDEX: 35.19 KG/M2 | HEART RATE: 71 BPM | DIASTOLIC BLOOD PRESSURE: 40 MMHG | HEIGHT: 64 IN | SYSTOLIC BLOOD PRESSURE: 111 MMHG | WEIGHT: 206.13 LBS | TEMPERATURE: 97.7 F | RESPIRATION RATE: 18 BRPM

## 2024-02-28 DIAGNOSIS — D05.12 DUCTAL CARCINOMA IN SITU (DCIS) OF LEFT BREAST: Primary | ICD-10-CM

## 2024-02-28 PROCEDURE — 7100000009 HC PHASE TWO TIME - INITIAL BASE CHARGE: Performed by: SURGERY

## 2024-02-28 PROCEDURE — 19301 PARTIAL MASTECTOMY: CPT | Performed by: SURGERY

## 2024-02-28 PROCEDURE — 7100000010 HC PHASE TWO TIME - EACH INCREMENTAL 1 MINUTE: Performed by: SURGERY

## 2024-02-28 PROCEDURE — A19301 PR MASTECTOMY, PARTIAL: Performed by: NURSE ANESTHETIST, CERTIFIED REGISTERED

## 2024-02-28 PROCEDURE — 3700000002 HC GENERAL ANESTHESIA TIME - EACH INCREMENTAL 1 MINUTE: Performed by: SURGERY

## 2024-02-28 PROCEDURE — 88307 TISSUE EXAM BY PATHOLOGIST: CPT | Performed by: PATHOLOGY

## 2024-02-28 PROCEDURE — 2500000004 HC RX 250 GENERAL PHARMACY W/ HCPCS (ALT 636 FOR OP/ED): Performed by: NURSE ANESTHETIST, CERTIFIED REGISTERED

## 2024-02-28 PROCEDURE — 88307 TISSUE EXAM BY PATHOLOGIST: CPT | Mod: TC,AHULAB | Performed by: SURGERY

## 2024-02-28 PROCEDURE — A19301 PR MASTECTOMY, PARTIAL: Performed by: STUDENT IN AN ORGANIZED HEALTH CARE EDUCATION/TRAINING PROGRAM

## 2024-02-28 PROCEDURE — 3600000009 HC OR TIME - EACH INCREMENTAL 1 MINUTE - PROCEDURE LEVEL FOUR: Performed by: SURGERY

## 2024-02-28 PROCEDURE — 7100000001 HC RECOVERY ROOM TIME - INITIAL BASE CHARGE: Performed by: SURGERY

## 2024-02-28 PROCEDURE — 2500000005 HC RX 250 GENERAL PHARMACY W/O HCPCS: Performed by: SURGERY

## 2024-02-28 PROCEDURE — 2500000005 HC RX 250 GENERAL PHARMACY W/O HCPCS: Performed by: NURSE ANESTHETIST, CERTIFIED REGISTERED

## 2024-02-28 PROCEDURE — 7100000002 HC RECOVERY ROOM TIME - EACH INCREMENTAL 1 MINUTE: Performed by: SURGERY

## 2024-02-28 PROCEDURE — 3600000004 HC OR TIME - INITIAL BASE CHARGE - PROCEDURE LEVEL FOUR: Performed by: SURGERY

## 2024-02-28 PROCEDURE — 2720000007 HC OR 272 NO HCPCS: Performed by: SURGERY

## 2024-02-28 PROCEDURE — 3700000001 HC GENERAL ANESTHESIA TIME - INITIAL BASE CHARGE: Performed by: SURGERY

## 2024-02-28 PROCEDURE — A4217 STERILE WATER/SALINE, 500 ML: HCPCS | Performed by: SURGERY

## 2024-02-28 PROCEDURE — 2500000004 HC RX 250 GENERAL PHARMACY W/ HCPCS (ALT 636 FOR OP/ED): Performed by: SURGERY

## 2024-02-28 RX ORDER — CEFAZOLIN SODIUM 2 G/100ML
2 INJECTION, SOLUTION INTRAVENOUS ONCE
Status: DISCONTINUED | OUTPATIENT
Start: 2024-02-28 | End: 2024-02-28 | Stop reason: HOSPADM

## 2024-02-28 RX ORDER — LIDOCAINE HYDROCHLORIDE 20 MG/ML
INJECTION, SOLUTION INFILTRATION; PERINEURAL AS NEEDED
Status: DISCONTINUED | OUTPATIENT
Start: 2024-02-28 | End: 2024-02-28

## 2024-02-28 RX ORDER — ONDANSETRON HYDROCHLORIDE 2 MG/ML
INJECTION, SOLUTION INTRAVENOUS AS NEEDED
Status: DISCONTINUED | OUTPATIENT
Start: 2024-02-28 | End: 2024-02-28

## 2024-02-28 RX ORDER — SODIUM CHLORIDE, SODIUM LACTATE, POTASSIUM CHLORIDE, CALCIUM CHLORIDE 600; 310; 30; 20 MG/100ML; MG/100ML; MG/100ML; MG/100ML
INJECTION, SOLUTION INTRAVENOUS CONTINUOUS PRN
Status: DISCONTINUED | OUTPATIENT
Start: 2024-02-28 | End: 2024-02-28

## 2024-02-28 RX ORDER — LIDOCAINE HYDROCHLORIDE 10 MG/ML
0.1 INJECTION, SOLUTION EPIDURAL; INFILTRATION; INTRACAUDAL; PERINEURAL ONCE
Status: DISCONTINUED | OUTPATIENT
Start: 2024-02-28 | End: 2024-02-28 | Stop reason: HOSPADM

## 2024-02-28 RX ORDER — HYDROMORPHONE HYDROCHLORIDE 1 MG/ML
INJECTION, SOLUTION INTRAMUSCULAR; INTRAVENOUS; SUBCUTANEOUS AS NEEDED
Status: DISCONTINUED | OUTPATIENT
Start: 2024-02-28 | End: 2024-02-28

## 2024-02-28 RX ORDER — SODIUM CHLORIDE, SODIUM LACTATE, POTASSIUM CHLORIDE, CALCIUM CHLORIDE 600; 310; 30; 20 MG/100ML; MG/100ML; MG/100ML; MG/100ML
100 INJECTION, SOLUTION INTRAVENOUS CONTINUOUS
Status: DISCONTINUED | OUTPATIENT
Start: 2024-02-28 | End: 2024-02-28 | Stop reason: HOSPADM

## 2024-02-28 RX ORDER — PROPOFOL 10 MG/ML
INJECTION, EMULSION INTRAVENOUS AS NEEDED
Status: DISCONTINUED | OUTPATIENT
Start: 2024-02-28 | End: 2024-02-28

## 2024-02-28 RX ORDER — PROPOFOL 10 MG/ML
INJECTION, EMULSION INTRAVENOUS CONTINUOUS PRN
Status: DISCONTINUED | OUTPATIENT
Start: 2024-02-28 | End: 2024-02-28

## 2024-02-28 RX ORDER — SODIUM CHLORIDE 0.9 G/100ML
IRRIGANT IRRIGATION AS NEEDED
Status: DISCONTINUED | OUTPATIENT
Start: 2024-02-28 | End: 2024-02-28 | Stop reason: HOSPADM

## 2024-02-28 RX ORDER — PHENYLEPHRINE HCL IN 0.9% NACL 1 MG/10 ML
SYRINGE (ML) INTRAVENOUS AS NEEDED
Status: DISCONTINUED | OUTPATIENT
Start: 2024-02-28 | End: 2024-02-28

## 2024-02-28 RX ORDER — FENTANYL CITRATE 50 UG/ML
INJECTION, SOLUTION INTRAMUSCULAR; INTRAVENOUS AS NEEDED
Status: DISCONTINUED | OUTPATIENT
Start: 2024-02-28 | End: 2024-02-28

## 2024-02-28 RX ORDER — ACETAMINOPHEN 325 MG/1
650 TABLET ORAL EVERY 4 HOURS PRN
Status: DISCONTINUED | OUTPATIENT
Start: 2024-02-28 | End: 2024-02-28 | Stop reason: HOSPADM

## 2024-02-28 RX ORDER — CEFAZOLIN 1 G/1
INJECTION, POWDER, FOR SOLUTION INTRAVENOUS AS NEEDED
Status: DISCONTINUED | OUTPATIENT
Start: 2024-02-28 | End: 2024-02-28

## 2024-02-28 RX ORDER — SODIUM CHLORIDE 9 MG/ML
100 INJECTION, SOLUTION INTRAVENOUS CONTINUOUS
Status: DISCONTINUED | OUTPATIENT
Start: 2024-02-28 | End: 2024-02-28 | Stop reason: HOSPADM

## 2024-02-28 RX ORDER — DEXAMETHASONE SODIUM PHOSPHATE 4 MG/ML
INJECTION, SOLUTION INTRA-ARTICULAR; INTRALESIONAL; INTRAMUSCULAR; INTRAVENOUS; SOFT TISSUE AS NEEDED
Status: DISCONTINUED | OUTPATIENT
Start: 2024-02-28 | End: 2024-02-28

## 2024-02-28 RX ORDER — MIDAZOLAM HYDROCHLORIDE 1 MG/ML
INJECTION, SOLUTION INTRAMUSCULAR; INTRAVENOUS AS NEEDED
Status: DISCONTINUED | OUTPATIENT
Start: 2024-02-28 | End: 2024-02-28

## 2024-02-28 RX ORDER — OXYCODONE HYDROCHLORIDE 5 MG/1
10 TABLET ORAL EVERY 4 HOURS PRN
Status: DISCONTINUED | OUTPATIENT
Start: 2024-02-28 | End: 2024-02-28 | Stop reason: HOSPADM

## 2024-02-28 RX ORDER — OXYCODONE HYDROCHLORIDE 5 MG/1
5 TABLET ORAL EVERY 4 HOURS PRN
Status: DISCONTINUED | OUTPATIENT
Start: 2024-02-28 | End: 2024-02-28 | Stop reason: HOSPADM

## 2024-02-28 RX ADMIN — SODIUM CHLORIDE, POTASSIUM CHLORIDE, SODIUM LACTATE AND CALCIUM CHLORIDE: 600; 310; 30; 20 INJECTION, SOLUTION INTRAVENOUS at 14:13

## 2024-02-28 RX ADMIN — DEXAMETHASONE SODIUM PHOSPHATE 8 MG: 4 INJECTION, SOLUTION INTRA-ARTICULAR; INTRALESIONAL; INTRAMUSCULAR; INTRAVENOUS; SOFT TISSUE at 14:22

## 2024-02-28 RX ADMIN — CEFAZOLIN 2 G: 330 INJECTION, POWDER, FOR SOLUTION INTRAMUSCULAR; INTRAVENOUS at 14:28

## 2024-02-28 RX ADMIN — Medication 100 MCG: at 14:55

## 2024-02-28 RX ADMIN — MIDAZOLAM HYDROCHLORIDE 2 MG: 1 INJECTION, SOLUTION INTRAMUSCULAR; INTRAVENOUS at 14:13

## 2024-02-28 RX ADMIN — PROPOFOL 30 MG: 10 INJECTION, EMULSION INTRAVENOUS at 14:56

## 2024-02-28 RX ADMIN — Medication 100 MCG: at 14:47

## 2024-02-28 RX ADMIN — LIDOCAINE HYDROCHLORIDE 80 MG: 20 INJECTION, SOLUTION INFILTRATION; PERINEURAL at 14:20

## 2024-02-28 RX ADMIN — HYDROMORPHONE HYDROCHLORIDE 1 MG: 1 INJECTION, SOLUTION INTRAMUSCULAR; INTRAVENOUS; SUBCUTANEOUS at 14:30

## 2024-02-28 RX ADMIN — PROPOFOL 150 MG: 10 INJECTION, EMULSION INTRAVENOUS at 14:20

## 2024-02-28 RX ADMIN — FENTANYL CITRATE 50 MCG: 50 INJECTION, SOLUTION INTRAMUSCULAR; INTRAVENOUS at 14:13

## 2024-02-28 RX ADMIN — Medication 100 MCG: at 14:41

## 2024-02-28 RX ADMIN — FENTANYL CITRATE 50 MCG: 50 INJECTION, SOLUTION INTRAMUSCULAR; INTRAVENOUS at 14:20

## 2024-02-28 RX ADMIN — PROPOFOL 50 MCG/KG/MIN: 10 INJECTION, EMULSION INTRAVENOUS at 14:27

## 2024-02-28 RX ADMIN — ONDANSETRON 4 MG: 2 INJECTION, SOLUTION INTRAMUSCULAR; INTRAVENOUS at 14:22

## 2024-02-28 ASSESSMENT — PAIN - FUNCTIONAL ASSESSMENT
PAIN_FUNCTIONAL_ASSESSMENT: 0-10

## 2024-02-28 ASSESSMENT — PAIN SCALES - GENERAL
PAINLEVEL_OUTOF10: 0 - NO PAIN
PAIN_LEVEL: 0
PAINLEVEL_OUTOF10: 0 - NO PAIN

## 2024-02-28 ASSESSMENT — COLUMBIA-SUICIDE SEVERITY RATING SCALE - C-SSRS
2. HAVE YOU ACTUALLY HAD ANY THOUGHTS OF KILLING YOURSELF?: NO
1. IN THE PAST MONTH, HAVE YOU WISHED YOU WERE DEAD OR WISHED YOU COULD GO TO SLEEP AND NOT WAKE UP?: NO
6. HAVE YOU EVER DONE ANYTHING, STARTED TO DO ANYTHING, OR PREPARED TO DO ANYTHING TO END YOUR LIFE?: NO

## 2024-02-28 NOTE — ANESTHESIA POSTPROCEDURE EVALUATION
"Patient: Heidi Quiroz \"Chelita\"    Procedure Summary       Date: 02/28/24 Room / Location: [facility] / [facility]    Anesthesia Start: 1414 Anesthesia Stop: 1514    Procedure: Left Breast Partial Mastectomy, Reexcision (Left) Diagnosis:       Ductal carcinoma in situ (DCIS) of left breast      (Ductal carcinoma in situ (DCIS) of left breast [D05.12])    Surgeons: Samia Banda MD Responsible Provider: Torito Graham DO    Anesthesia Type: general ASA Status: 2            Anesthesia Type: general    Vitals Value Taken Time   /48 02/28/24 1514   Temp 36 02/28/24 1514   Pulse 80 02/28/24 1514   Resp 14 02/28/24 1514   SpO2 98 02/28/24 1514       Anesthesia Post Evaluation    Patient location during evaluation: PACU  Patient participation: complete - patient participated  Level of consciousness: awake and alert  Pain score: 0  Pain management: satisfactory to patient  Airway patency: patent  Cardiovascular status: acceptable  Respiratory status: acceptable  Hydration status: acceptable  Postoperative Nausea and Vomiting: none      There were no known notable events for this encounter.    "

## 2024-02-28 NOTE — ANESTHESIA PREPROCEDURE EVALUATION
"Patient: Heidi Quiroz \"Chelita\"    Procedure Information       Date/Time: 02/28/24 1200    Procedure: Left Breast Partial Mastectomy, Reexcision (Left)    Location: [facility] OR 03 / Virtual [facility] A OR    Surgeons: Samia Banda MD            Relevant Problems   Anesthesia   (-) PONV (postoperative nausea and vomiting)      Cardiovascular   (+) Hypertension   (+) Pure hypercholesterolemia      Endocrine   (+) Class 2 obesity with alveolar hypoventilation and body mass index (BMI) of 35.0 to 35.9 in adult (CMS/HCC)   (+) Hyperthyroidism   (+) Multinodular thyroid      GI   (+) Ulcerative colitis (CMS/HCC)      Neuro/Psych   (+) Adult situational stress disorder   (+) Sciatica of left side      Hematology   (+) Anemia      Eyes, Ears, Nose, and Throat   (+) Bilateral sensorineural hearing loss       Clinical information reviewed:   Tobacco  Allergies  Meds   Med Hx  Surg Hx  OB Status  Fam Hx  Soc   Hx        NPO Detail:  NPO/Void Status  Carbohydrate Drink Given Prior to Surgery? : N  Date of Last Liquid: 02/28/24  Time of Last Liquid: 0800  Date of Last Solid: 02/27/24  Time of Last Solid: 2100  Last Intake Type: Clear fluids         Physical Exam    Airway  Mallampati: II  TM distance: >3 FB  Neck ROM: full     Cardiovascular - normal exam     Dental    Pulmonary - normal exam     Abdominal            Anesthesia Plan    History of general anesthesia?: yes  History of complications of general anesthesia?: no    ASA 2     general     intravenous induction   Anesthetic plan and risks discussed with patient.  Use of blood products discussed with patient who.    Plan discussed with CRNA.      "

## 2024-02-28 NOTE — NURSING NOTE
1701: Handoff received from Rachael FERNANDEZ    1710: Patient dressed with RN assistance    1715: Peripheral IV removed with no complications.     1730: Discharge instructions reviewed with patient and family, no further questions at this time.     1801: Patient to main lobby via transport with all belongings in stable condition. Phase 2 complete.

## 2024-02-28 NOTE — OP NOTE
"Left Breast Partial Mastectomy, Reexcision (L) Operative Note     Date: 2024  OR Location: [facility] OR    Name: Heidi Quiroz \"Yasmeen", : 1965, Age: 58 y.o., MRN: [id number], Sex: female    Diagnosis  Pre-op Diagnosis     * Ductal carcinoma in situ (DCIS) of left breast [D05.12] Post-op Diagnosis     * Ductal carcinoma in situ (DCIS) of left breast [D05.12]     Procedures  Left Breast Partial Mastectomy, Reexcision  42158 - ND MASTECTOMY PARTIAL      Surgeons      * Samia Banda - Primary    Resident/Fellow/Other Assistant:  Surgeon(s) and Role:    Procedure Summary  Anesthesia: General  ASA: II  Anesthesia Staff: Anesthesiologist: Torito Graham DO  CRNA: SHAYLEE Jesus-CRNA  Estimated Blood Loss: 5 mL  Intra-op Medications: Administrations occurring from 1200 to 1330 on 24:  * No intraprocedure medications in log *           Anesthesia Record               Intraprocedure I/O Totals          Intake    Propofol Drip 0.00 mL    The total shown is the total volume documented since Anesthesia Start was filed.    Total Intake 0 mL          Specimen:   ID Type Source Tests Collected by Time   1 : LEFT BREAST NEW ANTERIOR MARGIN, STITCH MARKS NEW MARGIN Tissue BREAST MARGIN LEFT SURGICAL PATHOLOGY EXAM Samia Banda MD 2024 1440   2 : LEFT BREAST NEW LATERAL MARGIN, STITCH MARKS NEW MARGIN Tissue BREAST MARGIN LEFT SURGICAL PATHOLOGY EXAM Samia Banda MD 2024 1440   3 : LEFT BREAST NEW POSTERIOR MARGIN, STITCH MARKS NEW MARGIN Tissue BREAST MARGIN LEFT SURGICAL PATHOLOGY EXAM Samia Banda MD 2024 1441        Staff:   Circulator: Eufemia Quintero RN; Eufemia Benavidez RN  Scrub Person: Gaye Escobedo; Leti Melo         Drains and/or Catheters: * None in log *    Tourniquet Times:         Implants:     Findings: well defined lumpectomy cavity    Indications: Chelita Quiroz is an 58 y.o. female who is having surgery for Ductal carcinoma in situ (DCIS) of left breast [D05.12].     The patient was seen " in the preoperative area. The risks, benefits, complications, treatment options, non-operative alternatives, expected recovery and outcomes were discussed with the patient. The possibilities of reaction to medication, pulmonary aspiration, injury to surrounding structures, bleeding, recurrent infection, the need for additional procedures, failure to diagnose a condition, and creating a complication requiring transfusion or operation were discussed with the patient. The patient concurred with the proposed plan, giving informed consent.  The site of surgery was properly noted/marked if necessary per policy. The patient has been actively warmed in preoperative area. Preoperative antibiotics have been ordered and given within 1 hours of incision. Venous thrombosis prophylaxis have been ordered including bilateral sequential compression devices    Procedure Details:   Informed consent was obtained. The surgical site marked and the Day of Surgery Update completed. The patient was taken to the operating room and positioned in a supine position on the operating room table. Anesthesia was induced without difficulty. SCDs were placed for DVT prophylaxis. Antibiotics were administered within 60 minutes prior to incision for surgical prophylaxis. Lower body Alena Hugger was applied to help maintain normothermia.     I reviewed my note and the appropriate films.  A surgical safety time-out was performed.       The patient was sterilely prepped and draped in the usual fashion.       I then turned my attention to the left breast. The skin and surrounding tissue was anesthetized with local anesthetic. Her prior circumareolar skin incision was re opened with a 15 blade scalpel.  Subcutaneous tissues were sharply divided down to and into subcutaneous fat using Bovie cautery.  I dissected towards the lumpectomy cavity.  I identified by clips.  I excised new anterior, posterior and lateral margins and oriented the new margin with a  stitch.     Hemostasis was achieved with bovie.  The deep dermal layer was closed with interrupted 3-0 vicryl sutures.  The skin was closed with a running 4-0 monocryl subcuticular stitch.        A sterile dressing was applied.  A surgical bra was used for light compression.     Anesthesia was reversed and the patient was brought to the recovery room in stable condition having tolerated the procedure well.  There were no complications.  30 cc of 1% lidocaine/0.25% marcaine mixed was used throughout the case.       Complications:  None; patient tolerated the procedure well.    Disposition: PACU - hemodynamically stable.  Condition: stable     This procedure was not performed to treat breast cancer through sentinel node biopsy      Additional Details: posterior margin above pec fascia    Attending Attestation: I was present and scrubbed for the key portions of the procedure.    Samia Banda  Phone Number: [phone]

## 2024-02-28 NOTE — ANESTHESIA PROCEDURE NOTES
Airway  Date/Time: 2/28/2024 2:21 PM  Urgency: elective      Staffing  Performed: CRNA   Authorized by: Torito Graham DO    Performed by: SHAYLEE Jesus-BETH  Patient location during procedure: OR    Indications and Patient Condition  Indications for airway management: anesthesia  Spontaneous Ventilation: absent  Sedation level: deep  Preoxygenated: yes  Patient position: sniffing  Mask difficulty assessment: 0 - not attempted    Final Airway Details  Final airway type: supraglottic airway      Successful airway: Size 4     Number of attempts at approach: 1

## 2024-02-28 NOTE — DISCHARGE INSTRUCTIONS
POST-OP INSTRUCTIONS FOR BREAST SURGERY PATIENTS: DR. MACHO BANDA    Activity:    Avoid direct impact to the surgical site.  No heavy lifting (greater than 10 lbs) or strenuous activity with the arm on the surgical side until evaluated at post-op appointment.    Wound care:    Ice pack, if desired, on and off in 15 minute intervals.  A supportive bra can be worn for comfort and support.  You may start showering normally tomorrow. Don't scrub and pat the surgical site dry.  No swimming or submerging the surgical site in a hot tub or bath for 2 weeks.  Outer dressing to be removed in 2 days.  Leave steri-strips intact for 1 week.  Observe the surgical site for signs of bleeding or infection. (Some swelling or bruising is anticipated.  A small amount of blood or an air bubble beneath the dressing is not a cause for concern.)    Call physician with abnormal findings: Progressive swelling, increasing pain, bright red skin discoloration, chills or fever (Temperature 101.5 or higher).    Follow-up:  post-op appointment with Dr. Banda was previously scheduled.  Pathology report will be discussed at that visit.    Pain:    No RX given.   You may also use over-the-counter medication such as Tylenol (acetaminophen) or Advil (ibuprofen).  Remember that some prescription medications contain Tylenol (acetaminophen). If you are taking a prescription medication that contains Tylenol (acetaminophen), do not take additional over-the-counter Tylenol.  All pain medications can be constipating; remember to drink plenty of fluids and use stool softener and/or laxatives as needed. These are available over-the-counter.  Pain control is best when medication is taken before pain becomes severe.    Medication refills:    Medications cannot be refilled after business hours or on weekends.      Questions / Concerns:  Call Clinic [phone], option 2.

## 2024-03-04 ENCOUNTER — HOSPITAL ENCOUNTER (OUTPATIENT)
Dept: RADIOLOGY | Facility: CLINIC | Age: 59
Discharge: HOME | End: 2024-03-04
Payer: COMMERCIAL

## 2024-03-04 DIAGNOSIS — N95.0 POSTMENOPAUSAL BLEEDING: ICD-10-CM

## 2024-03-04 DIAGNOSIS — N95.0 PMB (POSTMENOPAUSAL BLEEDING): ICD-10-CM

## 2024-03-04 LAB
LABORATORY COMMENT REPORT: NORMAL
PATH REPORT.FINAL DX SPEC: NORMAL
PATH REPORT.GROSS SPEC: NORMAL
PATH REPORT.RELEVANT HX SPEC: NORMAL
PATH REPORT.TOTAL CANCER: NORMAL

## 2024-03-04 PROCEDURE — 76856 US EXAM PELVIC COMPLETE: CPT

## 2024-03-04 PROCEDURE — 76830 TRANSVAGINAL US NON-OB: CPT

## 2024-03-04 PROCEDURE — 76856 US EXAM PELVIC COMPLETE: CPT | Performed by: RADIOLOGY

## 2024-03-04 PROCEDURE — 76830 TRANSVAGINAL US NON-OB: CPT | Performed by: RADIOLOGY

## 2024-03-05 NOTE — PROGRESS NOTES
Chief Complaint  Left breast DCIS, ER neg  Post op  S/p left PM 2/15/2024  S/p left PM re excision 2024      History of Present Illness    Referring Provider: BRITANY Rapp  PCP BRITANY Hare DO    57 yo Ashkenazi Nondenominational,  female here with New left breast DCIS, ER negative.  S/p left PM 2/15/2024    History:  1) No abnormal mammograms, breast biopsies, or breast surgeries.  2) 2022.  Bilateral mammogram.  Scattered fibroglandular tissue bilaterally.  BI-RADS 1.  3) 2023.  Scattered fibroglandular tissue.  Right breast negative.  Left breast upper outer calcifications.  Left breast asymmetry, BI-RADS 0.  4) 2023.  Left breast diagnostic imaging.  Left breast indeterminate calcifications are confirmed.  Left breast asymmetry resolves.  Targeted ultrasound was performed.  No suspicious findings.  BI-RADS 4.  Left breast stereotactic biopsy of calcifications is recommended.  5) 2024.Left breast calcifications biopsy.  High-grade DCIS, ER negative.  Dumbbell Tri Jesse clip is in good position.  Calcifications span approximately 1 cm on mammogram.  6) 2/15/2024. Left PM 3.6 cm grade 3 DCIS, close anterior, lateral, posterior margins pTis  7) 2024 left re-excision no residual disease      She presents today for post op  No breast concerns    Ob/gyn history:  Menarche 12  Menopause 49  , age of first delivery 29  no OCPs, no fertility treatments, no HRT    Mother with lung cancer at 72  Father with liver cancer at 62  Brother with lung cancer at 63  Brother with lung cancer at 66    Review of systems  A comprehensive ROS was taken on the patient intake form and reviewed with the patient. This form is scanned into the electronic medical record.    Constitutional symptoms: Denies generalized fatigue. Denies weight change, fevers/chills, difficulty sleeping   Eyes: Denies double vision, glaucoma, cataracts.  Ear/nose/throat/mouth: Denies hearing changes, sore throat, sinus  problems.  Cardiovascular: No chest pain. Denies irregular heartbeat. Denies ankle swelling.  Respiratory: No wheezing, cough, or shortness of breath.  Gastrointestinal: No abdominal pain, No nausea/vomiting. No indigestion/heartburn. No change in bowel habits. No constipation or diarrhea.   Genitourinary: No urinary incontinence. No urinary frequency. No painful urination.  Musculoskeletal: No bone pain, no muscle pain, no joint pain.   Integumentary: No rash. No masses. No changes in moles. No easy bruising.  Neurological: No headaches. No tremors. No numbness/tingling.  Psychiatric: No anxiety. No depression.  Endocrine: No excessive thirst. Not too hot or too cold. Not tired or fatigued.   Hematological/lymphatic: No swollen glands or blood clotting problems. No bruising.     Physical exam  A chaperone was offered for all portions of the physical exam. The patient declined.     General appearance: appears stated age, alert and oriented x 3  Head: Normocephalic, atraumatic  Eyes: conjunctivae/corneas clear.  Ears: External ears are normal, hearing is grossly intact.  Lungs: normal breathing  Heart: regular   Abdomen: Soft, nontender, nondistended.  Neurologic: grossly normal  Lymph nodes: No cervical, supraclavicular or axillary lymphadenopathy bilaterally    Breast: A comprehensive breast exam was performed in the seated and supine positions. Breasts are symmetrical. Bilateral nipples are everted. There are no skin changes on arm maneuvers. Bra size: 44D   Right: No masses   Left: healing w circumareolar incision. No masses. No erythema.     Results  Imaging  All breast imaging personally reviewed by me.  See HPI    Pathology  January 22, 2024.Left breast calcifications biopsy.  High-grade DCIS, ER negative.  Dumbbell Tri Jeses clip is in good position.  Calcifications span approximately 1 cm on mammogram.    Impression:   1) 59 yo Ashkenazi [employer],  female here with New left breast DCIS, ER  negative.  Now s/p left PM, 3.6 cm grade 3 DCIS close margins.   Now s/p left PM re-excision, no residual disease  pTis  2) Co-morbidities include hypertension, hyperlipidemia.. Non-smoker  3) No family history of breast or ovarian cancer.  Referred to genetics.      Plan:   She is here alone today.  She is recovering well from surgery.  She has no activity restrictions.  We reviewed her pathology report and she was given a copy.  There was no residual disease noted on reexcision.  Her surgical treatment is complete.  We discussed the role of adjuvant therapies.  She has ER negative DCIS and there is no role for endocrine therapy.  Radiation oncology consultation is scheduled for today.  Bilateral mammogram is due in December.  She will follow-up in August for an exam.  She should call our office with any sooner concerns.

## 2024-03-06 ENCOUNTER — HOSPITAL ENCOUNTER (OUTPATIENT)
Dept: RADIATION ONCOLOGY | Facility: CLINIC | Age: 59
Setting detail: RADIATION/ONCOLOGY SERIES
Discharge: HOME | End: 2024-03-06
Payer: COMMERCIAL

## 2024-03-06 ENCOUNTER — APPOINTMENT (OUTPATIENT)
Dept: SURGICAL ONCOLOGY | Facility: CLINIC | Age: 59
End: 2024-03-06
Payer: COMMERCIAL

## 2024-03-06 ENCOUNTER — OFFICE VISIT (OUTPATIENT)
Dept: SURGICAL ONCOLOGY | Facility: CLINIC | Age: 59
End: 2024-03-06
Payer: COMMERCIAL

## 2024-03-06 VITALS
SYSTOLIC BLOOD PRESSURE: 128 MMHG | HEART RATE: 61 BPM | RESPIRATION RATE: 16 BRPM | OXYGEN SATURATION: 98 % | TEMPERATURE: 97.5 F | DIASTOLIC BLOOD PRESSURE: 82 MMHG | BODY MASS INDEX: 35.43 KG/M2 | WEIGHT: 206.4 LBS

## 2024-03-06 DIAGNOSIS — D05.12 DUCTAL CARCINOMA IN SITU (DCIS) OF LEFT BREAST: Primary | ICD-10-CM

## 2024-03-06 PROCEDURE — 1036F TOBACCO NON-USER: CPT | Performed by: SURGERY

## 2024-03-06 PROCEDURE — 99024 POSTOP FOLLOW-UP VISIT: CPT | Performed by: SURGERY

## 2024-03-06 PROCEDURE — 99215 OFFICE O/P EST HI 40 MIN: CPT | Performed by: RADIOLOGY

## 2024-03-06 PROCEDURE — 99205 OFFICE O/P NEW HI 60 MIN: CPT | Performed by: RADIOLOGY

## 2024-03-06 ASSESSMENT — ENCOUNTER SYMPTOMS
CONSTITUTIONAL NEGATIVE: 1
HEMATOLOGIC/LYMPHATIC NEGATIVE: 1
ENDOCRINE NEGATIVE: 1
DECREASED CONCENTRATION: 1
EYES NEGATIVE: 1
NERVOUS/ANXIOUS: 1
BACK PAIN: 1
RESPIRATORY NEGATIVE: 1
NEUROLOGICAL NEGATIVE: 1
CARDIOVASCULAR NEGATIVE: 1
GASTROINTESTINAL NEGATIVE: 1

## 2024-03-06 ASSESSMENT — COLUMBIA-SUICIDE SEVERITY RATING SCALE - C-SSRS
6. HAVE YOU EVER DONE ANYTHING, STARTED TO DO ANYTHING, OR PREPARED TO DO ANYTHING TO END YOUR LIFE?: NO
2. HAVE YOU ACTUALLY HAD ANY THOUGHTS OF KILLING YOURSELF?: NO
1. IN THE PAST MONTH, HAVE YOU WISHED YOU WERE DEAD OR WISHED YOU COULD GO TO SLEEP AND NOT WAKE UP?: NO

## 2024-03-06 ASSESSMENT — LIFESTYLE VARIABLES
AUDIT-C TOTAL SCORE: 1
SKIP TO QUESTIONS 9-10: 1
HOW MANY STANDARD DRINKS CONTAINING ALCOHOL DO YOU HAVE ON A TYPICAL DAY: 1 OR 2
HOW OFTEN DO YOU HAVE A DRINK CONTAINING ALCOHOL: MONTHLY OR LESS
HOW OFTEN DO YOU HAVE SIX OR MORE DRINKS ON ONE OCCASION: NEVER

## 2024-03-06 ASSESSMENT — PAIN SCALES - GENERAL: PAINLEVEL: 3

## 2024-03-06 ASSESSMENT — PATIENT HEALTH QUESTIONNAIRE - PHQ9
1. LITTLE INTEREST OR PLEASURE IN DOING THINGS: NOT AT ALL
SUM OF ALL RESPONSES TO PHQ9 QUESTIONS 1 AND 2: 0
2. FEELING DOWN, DEPRESSED OR HOPELESS: NOT AT ALL

## 2024-03-06 NOTE — PROGRESS NOTES
Radiation Oncology Outpatient Consult    Patient Name:  Heidi Quiroz  MRN:  [id number]  :  1965    Referring Provider: Samia Banda MD  Primary Care Provider: Holden Hare DO  Care Team: Patient Care Team:  Holden Hare DO as PCP - General (Internal Medicine)  Holden Hare DO as PCP - [facility] PCP    Date of Service: 3/6/2024     SUBJECTIVE  History of Present Illness:  Chelita Quiroz is a 58 y.o. female who was referred by Samia Banda MD, for a consultation to the [facility] Department of Radiation Oncology.  She is presenting for evaluation and management of her newly diagnosed ductal carcinoma in situ of the left breast.     Ms. Quiroz is a 58-year-old female who underwent a routine screening mammogram on 2023 which revealed grouped calcifications in the upper outer quadrant of the left breast.  Diagnostic views performed 2023 revealed indeterminate calcifications in the upper outer quadrant.  Ultrasound directed at the 1 o'clock position, 10 cm from the nipple revealed a cyst.  On 2024 she underwent a left breast core biopsy which revealed ductal carcinoma in situ, grade 3.  Estrogen receptors were negative.  She underwent a left partial mastectomy on 2/15/2024.  Pathology revealed a 3.6 cm ductal carcinoma in situ of the cribriform, micropapillary and solid subtypes, nuclear grade 3.  The resection margins were negative although DCIS was microns from the anterior margin and less than 1 mm from the lateral and posterior margins.  She underwent a reexcision on 2024 which was negative.  I was asked to see Ms. Quiroz by Dr. Samia Banda for a discussion regarding the role of radiation in the management of this newly diagnosed ductal carcinoma in situ.    Prior Radiotherapy:  No  Radiation Treatments       No radiation treatments to show. (Treatments may have been administered in another system.)          Current Systemic Treatment:  No     Presence of  Pacemaker or ICD:  No    Past Medical History:    Past Medical History:   Diagnosis Date    Cholelithiasis     Ductal carcinoma in situ (DCIS) of left breast     Essential (primary) hypertension     GERD (gastroesophageal reflux disease)     diet controlled    Hearing loss     mild    Hypothyroidism     Insomnia     Lung nodule     Nontoxic multinodular goiter     Pure hypercholesterolemia, unspecified     Ulcerative colitis (CMS/HCC)     under control with medication    Vitamin D deficiency, unspecified         Past Surgical History:    Past Surgical History:   Procedure Laterality Date    BREAST LUMPECTOMY Left 02/15/2024    COLONOSCOPY      DILATION AND CURETTAGE OF UTERUS      TOTAL HIP ARTHROPLASTY Left 2017    WISDOM TOOTH EXTRACTION          Family History:  Cancer-related family history includes Liver cancer in her father; Lung cancer in her brother and mother.    Social History:    Social History     Tobacco Use    Smoking status: Former     Packs/day: 0.50     Years: 7.00     Additional pack years: 0.00     Total pack years: 3.50     Types: Cigarettes     Quit date:      Years since quittin.1     Passive exposure: Past    Smokeless tobacco: Never   Vaping Use    Vaping Use: Never used   Substance Use Topics    Alcohol use: Yes     Comment: rare social    Drug use: Never       Allergies:  No Known Allergies     Medications:    Current Outpatient Medications:     cholecalciferol (Vitamin D-3) 50 mcg (2,000 unit) capsule, Take by mouth., Disp: , Rfl:     mesalamine (Lialda) 1.2 gram EC tablet, TAKE 4 TABLETS BY MOUTH ONCE DAILY WITH THE EVENING MEAL., Disp: 360 tablet, Rfl: 2    propranolol LA (Inderal LA) 80 mg 24 hr capsule, Take 1 capsule (80 mg) by mouth once daily. Do not crush, chew, or split., Disp: 90 capsule, Rfl: 1    rosuvastatin (Crestor) 20 mg tablet, Take 1 tablet (20 mg) by mouth once daily., Disp: 90 tablet, Rfl: 1    acetaminophen (Tylenol) 500 mg tablet, Take 1 tablet (500 mg) by  mouth every 6 hours if needed for mild pain (1 - 3)., Disp: , Rfl:     amoxicillin (Amoxil) 500 mg tablet, Take 4 tablets (2,000 mg) by mouth every 12 hours., Disp: , Rfl:     mesalamine (Lialda) 1.2 gram EC tablet, Take 4 tablets (4.8 g) by mouth once daily in the evening. Take with meals., Disp: 360 tablet, Rfl: 3    miSOPROStoL (Cytotec) 200 mcg tablet, Take 1 tablet (200 mcg) by mouth 1 time for 1 dose. Take 1 tablet by mouth the night before the biopsy., Disp: 1 tablet, Rfl: 0      Review of Systems:  Review of Systems   Constitutional: Negative.    HENT:  Negative.     Eyes: Negative.    Respiratory: Negative.     Cardiovascular: Negative.    Gastrointestinal: Negative.    Endocrine: Negative.    Genitourinary: Negative.     Musculoskeletal:  Positive for back pain.   Neurological: Negative.    Hematological: Negative.    Psychiatric/Behavioral:  Positive for decreased concentration. The patient is nervous/anxious.      The patient's current pain level was assessed.  They report currently having a pain of 3 out of 10.  They feel their pain is under control without the use of pain medications.    Performance Status:  The Karnofsky performance scale today is 100, Fully active, able to carry on all pre-disease performed without restriction (ECOG equivalent 0).        OBJECTIVE  Physical Exam:  /82 (BP Location: Right arm, Patient Position: Sitting, BP Cuff Size: Large adult)   Pulse 61   Temp 36.4 °C (97.5 °F) (Core)   Resp 16   Wt 93.6 kg (206 lb 6.4 oz)   LMP  (LMP Unknown)   SpO2 98%   BMI 35.43 kg/m²    Physical Exam  Constitutional:       Appearance: Normal appearance.   HENT:      Head: Normocephalic and atraumatic.   Eyes:      Conjunctiva/sclera: Conjunctivae normal.   Cardiovascular:      Heart sounds: Normal heart sounds.   Pulmonary:      Breath sounds: Normal breath sounds.   Chest:      Comments: There is a well-healing incision incision in the left breast.  There are no suspicious  nodules, nipple retraction, or nipple discharge bilaterally.  Abdominal:      Palpations: Abdomen is soft.   Musculoskeletal:      Right lower leg: No edema.      Left lower leg: No edema.   Lymphadenopathy:      Upper Body:      Right upper body: No supraclavicular or axillary adenopathy.      Left upper body: No supraclavicular or axillary adenopathy.   Neurological:      Mental Status: She is alert.          Laboratory Review:  There are no laboratory contraindications to radiation therapy.        Pathology Review:  The pertinent pathology results were reviewed and discussed with the patient.    See history for details.    Imaging:  The pertinent imaging results were reviewed and discussed with the patient.   See history for details       ASSESSMENT:  Heidi Quiroz is a 58 y.o. female with Ductal carcinoma in situ (DCIS) of left breast, Clinical: cTis (DCIS), cM0, G3, ER-  Ductal carcinoma in situ (DCIS) of left breast, Pathologic: Stage 0 (pTis (DCIS), pN0, cM0, G3, ER-).  She is status post left partial mastectomy.     PLAN: I had a long conversation with the patient detailing the role of radiation in the management of this newly diagnosed ductal carcinoma in situ.  The side effects of radiation discussed included but were not limited to skin irritation with possible breakdown, fatigue, possible poor cosmetic outcome, rib fragility, pulmonary toxicity, cardiac toxicity, and the possibility of a secondary malignancy induced by the radiation.  She voices understanding and wishes to proceed.  I have given her a simulation time and we will plan on starting her once the plan is complete.    NCCN Guidelines were applicable to guide this patients treatment plan.   Symone Hernandez MD

## 2024-03-06 NOTE — RESULT ENCOUNTER NOTE
The pelvic ultrasound does note an abnormally thickened endometrium of 1.9 cm.  Normally in menopause it is about 5 mm or less.  This could be a polyp, the endometrial biopsy appointment is already scheduled.  Otherwise the ultrasound is normal, no fibroids.  The left and right ovary are not visualized but this is normal in menopause.  There is no collection of fluid.    I recommend taking Cytotec the night before the endometrial biopsy and ibuprofen 1 hour before.

## 2024-03-11 ENCOUNTER — APPOINTMENT (OUTPATIENT)
Dept: SURGICAL ONCOLOGY | Facility: CLINIC | Age: 59
End: 2024-03-11
Payer: COMMERCIAL

## 2024-03-18 ENCOUNTER — PROCEDURE VISIT (OUTPATIENT)
Dept: OBSTETRICS AND GYNECOLOGY | Facility: CLINIC | Age: 59
End: 2024-03-18
Payer: COMMERCIAL

## 2024-03-18 DIAGNOSIS — N95.0 PMB (POSTMENOPAUSAL BLEEDING): Primary | ICD-10-CM

## 2024-03-18 PROCEDURE — 88305 TISSUE EXAM BY PATHOLOGIST: CPT

## 2024-03-18 PROCEDURE — 88341 IMHCHEM/IMCYTCHM EA ADD ANTB: CPT

## 2024-03-18 PROCEDURE — 88342 IMHCHEM/IMCYTCHM 1ST ANTB: CPT | Performed by: PATHOLOGY

## 2024-03-18 PROCEDURE — 88341 IMHCHEM/IMCYTCHM EA ADD ANTB: CPT | Performed by: PATHOLOGY

## 2024-03-18 PROCEDURE — 88305 TISSUE EXAM BY PATHOLOGIST: CPT | Performed by: PATHOLOGY

## 2024-03-18 PROCEDURE — 58100 BIOPSY OF UTERUS LINING: CPT | Performed by: OBSTETRICS & GYNECOLOGY

## 2024-03-18 PROCEDURE — 88342 IMHCHEM/IMCYTCHM 1ST ANTB: CPT

## 2024-03-18 NOTE — PROGRESS NOTES
"Patient ID: Heidi Quiroz \"Chelita\" is a 59 y.o. female with postmenopausal bleeding on her tissue for 2 weeks, thickened endometrial stripe on ultrasound.  Recent diagnosis of left breast DCIS.    Endometrial biopsy    Date/Time: 3/18/2024 12:21 PM    Performed by: Tamika Castellano MD  Authorized by: Tamika Castellano MD    Consent:     Consent obtained: written    Consent given by: patient    Risks discussed: bleeding and infection  Indications:     Indications: postmenopausal bleeding and thickened endometrium      Chronicity of post-menopausal bleeding: new    Progression of post-menopausal bleeding: unable to specify  Pre-procedure:     Urine pregnancy test: N/A    Procedure:     Prepped with: Betadine    Tenaculum used: yes      Local anesthetic comment: HurriCaine spray was used.    Cervix dilated: no      Number of passes: 1  Findings:     Uterus depth by sound (cm): 8    Specimen collected: specimen collected and sent to pathology      Patient tolerance: tolerated well, no immediate complications  Comments:     Procedure comments: This is a 59-year-old female with postmenopausal bleeding x 2 weeks.  The ultrasound on March 4, 2024 showed a uterus of 8.9 cm with thickened endometrial stripe of 1.9 cm.  The ovaries were not visualized, no free fluid.    Her Pap October 3, 2022 was negative, high risk HPV negative.  She was recently diagnosed with left breast DCIS and is getting radiation soon.    "

## 2024-03-21 ENCOUNTER — HOSPITAL ENCOUNTER (OUTPATIENT)
Dept: RADIATION ONCOLOGY | Facility: HOSPITAL | Age: 59
Setting detail: RADIATION/ONCOLOGY SERIES
Discharge: HOME | End: 2024-03-21
Payer: COMMERCIAL

## 2024-03-21 ENCOUNTER — HOSPITAL ENCOUNTER (OUTPATIENT)
Dept: RADIOLOGY | Facility: EXTERNAL LOCATION | Age: 59
Discharge: HOME | End: 2024-03-21

## 2024-03-21 DIAGNOSIS — D05.12 INTRADUCTAL CARCINOMA IN SITU OF LEFT BREAST: ICD-10-CM

## 2024-03-21 DIAGNOSIS — D05.12 INTRADUCTAL CARCINOMA IN SITU OF LEFT BREAST: Primary | ICD-10-CM

## 2024-03-21 PROCEDURE — 77263 THER RADIOLOGY TX PLNG CPLX: CPT | Performed by: RADIOLOGY

## 2024-03-21 PROCEDURE — 77332 RADIATION TREATMENT AID(S): CPT | Performed by: RADIOLOGY

## 2024-03-21 PROCEDURE — 77290 THER RAD SIMULAJ FIELD CPLX: CPT | Performed by: RADIOLOGY

## 2024-03-22 ENCOUNTER — TELEPHONE (OUTPATIENT)
Dept: OBSTETRICS AND GYNECOLOGY | Facility: CLINIC | Age: 59
End: 2024-03-22
Payer: COMMERCIAL

## 2024-03-22 DIAGNOSIS — N89.8 VAGINAL ODOR: Primary | ICD-10-CM

## 2024-03-22 RX ORDER — METRONIDAZOLE 500 MG/1
500 TABLET ORAL 2 TIMES DAILY
Qty: 14 TABLET | Refills: 0 | Status: SHIPPED | OUTPATIENT
Start: 2024-03-22 | End: 2024-03-29

## 2024-03-22 NOTE — TELEPHONE ENCOUNTER
Patient called yesterday concerned about cramping and bleeding. Patient states that those symptoms are better but is now experiencing irritation and a smell. Pharmacy confirmed.  Please advise.    English Oswald

## 2024-03-22 NOTE — TELEPHONE ENCOUNTER
Her endometrial biopsy is pending from March 18.  She had bleeding and now has a vaginal irritation and smell.  Metronidazole tablet was ordered to her pharmacy for BV symptoms.

## 2024-03-26 ENCOUNTER — HOSPITAL ENCOUNTER (OUTPATIENT)
Dept: RADIATION ONCOLOGY | Facility: HOSPITAL | Age: 59
Setting detail: RADIATION/ONCOLOGY SERIES
Discharge: HOME | End: 2024-03-26
Payer: COMMERCIAL

## 2024-03-26 DIAGNOSIS — C54.1 ENDOMETRIAL CANCER DETERMINED BY UTERINE BIOPSY (MULTI): Primary | ICD-10-CM

## 2024-03-26 LAB
LAB AP ASR DISCLAIMER: NORMAL
LAB AP BLOCK FOR ADDITIONAL STUDIES: NORMAL
LABORATORY COMMENT REPORT: NORMAL
PATH REPORT.ADDENDUM SPEC: NORMAL
PATH REPORT.ADDENDUM SPEC: NORMAL
PATH REPORT.COMMENTS IMP SPEC: NORMAL
PATH REPORT.FINAL DX SPEC: NORMAL
PATH REPORT.GROSS SPEC: NORMAL
PATH REPORT.RELEVANT HX SPEC: NORMAL
PATH REPORT.TOTAL CANCER: NORMAL

## 2024-03-26 PROCEDURE — 77336 RADIATION PHYSICS CONSULT: CPT | Performed by: RADIOLOGY

## 2024-03-26 PROCEDURE — 77300 RADIATION THERAPY DOSE PLAN: CPT | Performed by: RADIOLOGY

## 2024-03-26 PROCEDURE — 77334 RADIATION TREATMENT AID(S): CPT | Performed by: RADIOLOGY

## 2024-03-26 PROCEDURE — 77295 3-D RADIOTHERAPY PLAN: CPT | Performed by: RADIOLOGY

## 2024-03-26 NOTE — RESULT ENCOUNTER NOTE
I talked to the patient, we discussed the findings of the endometrial biopsy showed endometrial cancer.  She was referred to GYN oncology.    We did discuss that she unfortunately was recently diagnosed with breast cancer in February 2024, she reports it was not estrogen receptor positive.

## 2024-03-27 ENCOUNTER — TELEPHONE (OUTPATIENT)
Dept: OBSTETRICS AND GYNECOLOGY | Facility: CLINIC | Age: 59
End: 2024-03-27
Payer: COMMERCIAL

## 2024-03-27 NOTE — TELEPHONE ENCOUNTER
Pt has initial appointment on April 25th with the oncology surgeon, she wants to know if she can get in any sooner. She is very worried and concerned, GELY

## 2024-03-28 NOTE — TELEPHONE ENCOUNTER
The patient had left a message.  She was diagnosed with breast cancer in February 2024 and now endometrial cancer in March 2024.  Dr. Hernandez of radiation oncology had also contacted me.  The plan is to proceed with her 4 weeks of breast radiation, and she will then proceed with GYN oncology after.  She does an appointment with Dr. Nancy Eckert on April 25, 2024.

## 2024-04-01 ENCOUNTER — TELEPHONE (OUTPATIENT)
Dept: OBSTETRICS AND GYNECOLOGY | Facility: CLINIC | Age: 59
End: 2024-04-01
Payer: COMMERCIAL

## 2024-04-01 NOTE — TELEPHONE ENCOUNTER
Patient is calling about the results of her ultrasound. She wants to know if anything was ruled out with her ovaries.     English Oswald

## 2024-04-01 NOTE — TELEPHONE ENCOUNTER
I also left a voicemail.  The ultrasound that she had March 4, 2023 showed a normal uterus with endometrial thickening.  The endometrial biopsy since that visit has unfortunately diagnosed endometrial cancer.  The bilateral ovaries are not visualized, which is considered normal in menopause.  If there were to be a large cyst or mass, it would have been visualized.  Also, there is no collection of fluid or ascites.  Therefore the ovaries are considered normal.    She will follow-up with GYN oncology as planned, which may include a CT scan per their discretion..

## 2024-04-03 ENCOUNTER — HOSPITAL ENCOUNTER (OUTPATIENT)
Dept: RADIATION ONCOLOGY | Facility: CLINIC | Age: 59
Setting detail: RADIATION/ONCOLOGY SERIES
Discharge: HOME | End: 2024-04-03
Payer: COMMERCIAL

## 2024-04-03 DIAGNOSIS — D05.12 INTRADUCTAL CARCINOMA IN SITU OF LEFT BREAST: ICD-10-CM

## 2024-04-03 DIAGNOSIS — Z51.0 ENCOUNTER FOR ANTINEOPLASTIC RADIATION THERAPY: ICD-10-CM

## 2024-04-03 LAB
RAD ONC MSQ ACTUAL FRACTIONS DELIVERED: 1
RAD ONC MSQ ACTUAL SESSION DELIVERED DOSE: 266 CGRAY
RAD ONC MSQ ACTUAL TOTAL DOSE: 266 CGRAY
RAD ONC MSQ ELAPSED DAYS: 0
RAD ONC MSQ LAST DATE: NORMAL
RAD ONC MSQ PRESCRIBED FRACTIONAL DOSE: 266 CGRAY
RAD ONC MSQ PRESCRIBED NUMBER OF FRACTIONS: 16
RAD ONC MSQ PRESCRIBED TECHNIQUE: NORMAL
RAD ONC MSQ PRESCRIBED TOTAL DOSE: 4256 CGRAY
RAD ONC MSQ PRESCRIPTION PATTERN COMMENT: NORMAL
RAD ONC MSQ START DATE: NORMAL
RAD ONC MSQ TREATMENT COURSE NUMBER: 1
RAD ONC MSQ TREATMENT SITE: NORMAL

## 2024-04-03 PROCEDURE — 77280 THER RAD SIMULAJ FIELD SMPL: CPT | Performed by: RADIOLOGY

## 2024-04-03 PROCEDURE — 77412 RADIATION TX DELIVERY LVL 3: CPT | Performed by: RADIOLOGY

## 2024-04-04 ENCOUNTER — HOSPITAL ENCOUNTER (OUTPATIENT)
Dept: RADIATION ONCOLOGY | Facility: CLINIC | Age: 59
Setting detail: RADIATION/ONCOLOGY SERIES
Discharge: HOME | End: 2024-04-04
Payer: COMMERCIAL

## 2024-04-04 DIAGNOSIS — Z51.0 ENCOUNTER FOR ANTINEOPLASTIC RADIATION THERAPY: ICD-10-CM

## 2024-04-04 DIAGNOSIS — D05.12 INTRADUCTAL CARCINOMA IN SITU OF LEFT BREAST: ICD-10-CM

## 2024-04-04 LAB
RAD ONC MSQ ACTUAL FRACTIONS DELIVERED: 2
RAD ONC MSQ ACTUAL SESSION DELIVERED DOSE: 266 CGRAY
RAD ONC MSQ ACTUAL TOTAL DOSE: 532 CGRAY
RAD ONC MSQ ELAPSED DAYS: 1
RAD ONC MSQ LAST DATE: NORMAL
RAD ONC MSQ PRESCRIBED FRACTIONAL DOSE: 266 CGRAY
RAD ONC MSQ PRESCRIBED NUMBER OF FRACTIONS: 16
RAD ONC MSQ PRESCRIBED TECHNIQUE: NORMAL
RAD ONC MSQ PRESCRIBED TOTAL DOSE: 4256 CGRAY
RAD ONC MSQ PRESCRIPTION PATTERN COMMENT: NORMAL
RAD ONC MSQ START DATE: NORMAL
RAD ONC MSQ TREATMENT COURSE NUMBER: 1
RAD ONC MSQ TREATMENT SITE: NORMAL

## 2024-04-04 PROCEDURE — 77387 GUIDANCE FOR RADJ TX DLVR: CPT | Performed by: RADIOLOGY

## 2024-04-04 PROCEDURE — 77412 RADIATION TX DELIVERY LVL 3: CPT | Performed by: RADIOLOGY

## 2024-04-04 PROCEDURE — 77014 CHG CT GUIDANCE RADIATION THERAPY FLDS PLACEMENT: CPT | Performed by: RADIOLOGY

## 2024-04-05 ENCOUNTER — HOSPITAL ENCOUNTER (OUTPATIENT)
Dept: RADIATION ONCOLOGY | Facility: CLINIC | Age: 59
Setting detail: RADIATION/ONCOLOGY SERIES
Discharge: HOME | End: 2024-04-05
Payer: COMMERCIAL

## 2024-04-05 ENCOUNTER — RADIATION ONCOLOGY OTV (OUTPATIENT)
Dept: RADIATION ONCOLOGY | Facility: CLINIC | Age: 59
End: 2024-04-05
Payer: COMMERCIAL

## 2024-04-05 VITALS
DIASTOLIC BLOOD PRESSURE: 77 MMHG | BODY MASS INDEX: 36.05 KG/M2 | OXYGEN SATURATION: 96 % | SYSTOLIC BLOOD PRESSURE: 145 MMHG | TEMPERATURE: 97.9 F | RESPIRATION RATE: 17 BRPM | HEART RATE: 62 BPM | WEIGHT: 210 LBS

## 2024-04-05 DIAGNOSIS — Z51.0 ENCOUNTER FOR ANTINEOPLASTIC RADIATION THERAPY: ICD-10-CM

## 2024-04-05 DIAGNOSIS — D05.12 INTRADUCTAL CARCINOMA IN SITU OF LEFT BREAST: ICD-10-CM

## 2024-04-05 LAB
RAD ONC MSQ ACTUAL FRACTIONS DELIVERED: 3
RAD ONC MSQ ACTUAL SESSION DELIVERED DOSE: 266 CGRAY
RAD ONC MSQ ACTUAL TOTAL DOSE: 798 CGRAY
RAD ONC MSQ ELAPSED DAYS: 2
RAD ONC MSQ LAST DATE: NORMAL
RAD ONC MSQ PRESCRIBED FRACTIONAL DOSE: 266 CGRAY
RAD ONC MSQ PRESCRIBED NUMBER OF FRACTIONS: 16
RAD ONC MSQ PRESCRIBED TECHNIQUE: NORMAL
RAD ONC MSQ PRESCRIBED TOTAL DOSE: 4256 CGRAY
RAD ONC MSQ PRESCRIPTION PATTERN COMMENT: NORMAL
RAD ONC MSQ START DATE: NORMAL
RAD ONC MSQ TREATMENT COURSE NUMBER: 1
RAD ONC MSQ TREATMENT SITE: NORMAL

## 2024-04-05 PROCEDURE — 77387 GUIDANCE FOR RADJ TX DLVR: CPT | Performed by: RADIOLOGY

## 2024-04-05 PROCEDURE — 77412 RADIATION TX DELIVERY LVL 3: CPT | Performed by: RADIOLOGY

## 2024-04-05 PROCEDURE — 77014 CHG CT GUIDANCE RADIATION THERAPY FLDS PLACEMENT: CPT | Performed by: RADIOLOGY

## 2024-04-05 PROCEDURE — 77427 RADIATION TX MANAGEMENT X5: CPT | Performed by: RADIOLOGY

## 2024-04-05 ASSESSMENT — PAIN SCALES - GENERAL: PAINLEVEL: 0-NO PAIN

## 2024-04-05 NOTE — PROGRESS NOTES
Radiation Oncology On Treatment Visit    Patient Name:  Heidi Quiroz  MRN:  [id number]  :  1965    Referring Provider: No ref. provider found  Primary Care Provider: Holden Hare DO  Care Team: Patient Care Team:  Holden Hare DO as PCP - General (Internal Medicine)  Holden Hare DO as PCP - [employer] PCP    Date of Service: 2024     Diagnosis:   Specialty Problems    None    Treatment Summary:  Radiation Treatments       Active   Lt breast (Started on 4/3/2024)   Most recent fraction: 266 cGy given on 2024   Total given: 798 cGy / 4,256 cGy  (3 of 16 fractions)   Elapsed Days: 2   Technique: Opposed Tangential           Completed   No historical radiation treatments to show.             SUBJECTIVE: Mild itching over the breast.        OBJECTIVE:   Vital Signs:  /77 (BP Location: Right arm, Patient Position: Sitting, BP Cuff Size: Adult)   Pulse 62   Temp 36.6 °C (97.9 °F) (Core)   Resp 17   Wt 95.3 kg (210 lb)   LMP  (LMP Unknown)   SpO2 96%   BMI 36.05 kg/m²    Pain Scale: The patient's current pain level was assessed.  They report currently having a pain of 0 out of 10.    Other Pertinent Findings: No erythema, skin intact.    Toxicity Assessment          2024    16:33   Toxicity Assessment   Adverse Events Reviewed (WDL) No (Exceptions to WDL)   Treatment Site Breast   Anorexia Grade 0   Dermatitis Radiation Grade 0       skin intact.  given aquaphor and skintegrity with instruction of use.   Fatigue Grade 0   Breast Pain Grade 0   Lymphedema Grade 0        Assessment / Plan:  The patient is tolerating radiation therapy as anticipated.  Continue per current treatment plan.   Chart and films reviewed and approved.

## 2024-04-08 ENCOUNTER — HOSPITAL ENCOUNTER (OUTPATIENT)
Dept: RADIATION ONCOLOGY | Facility: CLINIC | Age: 59
Setting detail: RADIATION/ONCOLOGY SERIES
Discharge: HOME | End: 2024-04-08
Payer: COMMERCIAL

## 2024-04-08 DIAGNOSIS — Z51.0 ENCOUNTER FOR ANTINEOPLASTIC RADIATION THERAPY: ICD-10-CM

## 2024-04-08 DIAGNOSIS — D05.12 INTRADUCTAL CARCINOMA IN SITU OF LEFT BREAST: ICD-10-CM

## 2024-04-08 LAB
RAD ONC MSQ ACTUAL FRACTIONS DELIVERED: 4
RAD ONC MSQ ACTUAL SESSION DELIVERED DOSE: 266 CGRAY
RAD ONC MSQ ACTUAL TOTAL DOSE: 1064 CGRAY
RAD ONC MSQ ELAPSED DAYS: 5
RAD ONC MSQ LAST DATE: NORMAL
RAD ONC MSQ PRESCRIBED FRACTIONAL DOSE: 266 CGRAY
RAD ONC MSQ PRESCRIBED NUMBER OF FRACTIONS: 16
RAD ONC MSQ PRESCRIBED TECHNIQUE: NORMAL
RAD ONC MSQ PRESCRIBED TOTAL DOSE: 4256 CGRAY
RAD ONC MSQ PRESCRIPTION PATTERN COMMENT: NORMAL
RAD ONC MSQ START DATE: NORMAL
RAD ONC MSQ TREATMENT COURSE NUMBER: 1
RAD ONC MSQ TREATMENT SITE: NORMAL

## 2024-04-08 PROCEDURE — 77387 GUIDANCE FOR RADJ TX DLVR: CPT | Performed by: RADIOLOGY

## 2024-04-08 PROCEDURE — 77014 CHG CT GUIDANCE RADIATION THERAPY FLDS PLACEMENT: CPT | Performed by: RADIOLOGY

## 2024-04-08 PROCEDURE — 77412 RADIATION TX DELIVERY LVL 3: CPT | Performed by: RADIOLOGY

## 2024-04-09 ENCOUNTER — HOSPITAL ENCOUNTER (OUTPATIENT)
Dept: RADIATION ONCOLOGY | Facility: CLINIC | Age: 59
Setting detail: RADIATION/ONCOLOGY SERIES
Discharge: HOME | End: 2024-04-09
Payer: COMMERCIAL

## 2024-04-09 DIAGNOSIS — Z51.0 ENCOUNTER FOR ANTINEOPLASTIC RADIATION THERAPY: ICD-10-CM

## 2024-04-09 DIAGNOSIS — D05.12 INTRADUCTAL CARCINOMA IN SITU OF LEFT BREAST: ICD-10-CM

## 2024-04-09 LAB
RAD ONC MSQ ACTUAL FRACTIONS DELIVERED: 5
RAD ONC MSQ ACTUAL SESSION DELIVERED DOSE: 266 CGRAY
RAD ONC MSQ ACTUAL TOTAL DOSE: 1330 CGRAY
RAD ONC MSQ ELAPSED DAYS: 6
RAD ONC MSQ LAST DATE: NORMAL
RAD ONC MSQ PRESCRIBED FRACTIONAL DOSE: 266 CGRAY
RAD ONC MSQ PRESCRIBED NUMBER OF FRACTIONS: 16
RAD ONC MSQ PRESCRIBED TECHNIQUE: NORMAL
RAD ONC MSQ PRESCRIBED TOTAL DOSE: 4256 CGRAY
RAD ONC MSQ PRESCRIPTION PATTERN COMMENT: NORMAL
RAD ONC MSQ START DATE: NORMAL
RAD ONC MSQ TREATMENT COURSE NUMBER: 1
RAD ONC MSQ TREATMENT SITE: NORMAL

## 2024-04-09 PROCEDURE — 77014 CHG CT GUIDANCE RADIATION THERAPY FLDS PLACEMENT: CPT | Performed by: RADIOLOGY

## 2024-04-09 PROCEDURE — 77412 RADIATION TX DELIVERY LVL 3: CPT | Performed by: RADIOLOGY

## 2024-04-09 PROCEDURE — 77387 GUIDANCE FOR RADJ TX DLVR: CPT | Performed by: RADIOLOGY

## 2024-04-10 ENCOUNTER — HOSPITAL ENCOUNTER (OUTPATIENT)
Dept: RADIATION ONCOLOGY | Facility: CLINIC | Age: 59
Setting detail: RADIATION/ONCOLOGY SERIES
Discharge: HOME | End: 2024-04-10
Payer: COMMERCIAL

## 2024-04-10 DIAGNOSIS — Z51.0 ENCOUNTER FOR ANTINEOPLASTIC RADIATION THERAPY: ICD-10-CM

## 2024-04-10 DIAGNOSIS — D05.12 INTRADUCTAL CARCINOMA IN SITU OF LEFT BREAST: ICD-10-CM

## 2024-04-10 LAB
RAD ONC MSQ ACTUAL FRACTIONS DELIVERED: 6
RAD ONC MSQ ACTUAL SESSION DELIVERED DOSE: 266 CGRAY
RAD ONC MSQ ACTUAL TOTAL DOSE: 1596 CGRAY
RAD ONC MSQ ELAPSED DAYS: 7
RAD ONC MSQ LAST DATE: NORMAL
RAD ONC MSQ PRESCRIBED FRACTIONAL DOSE: 266 CGRAY
RAD ONC MSQ PRESCRIBED NUMBER OF FRACTIONS: 16
RAD ONC MSQ PRESCRIBED TECHNIQUE: NORMAL
RAD ONC MSQ PRESCRIBED TOTAL DOSE: 4256 CGRAY
RAD ONC MSQ PRESCRIPTION PATTERN COMMENT: NORMAL
RAD ONC MSQ START DATE: NORMAL
RAD ONC MSQ TREATMENT COURSE NUMBER: 1
RAD ONC MSQ TREATMENT SITE: NORMAL

## 2024-04-10 PROCEDURE — 77014 CHG CT GUIDANCE RADIATION THERAPY FLDS PLACEMENT: CPT | Performed by: RADIOLOGY

## 2024-04-10 PROCEDURE — 77412 RADIATION TX DELIVERY LVL 3: CPT | Performed by: RADIOLOGY

## 2024-04-10 PROCEDURE — 77387 GUIDANCE FOR RADJ TX DLVR: CPT | Performed by: RADIOLOGY

## 2024-04-11 ENCOUNTER — HOSPITAL ENCOUNTER (OUTPATIENT)
Dept: RADIATION ONCOLOGY | Facility: CLINIC | Age: 59
Setting detail: RADIATION/ONCOLOGY SERIES
Discharge: HOME | End: 2024-04-11
Payer: COMMERCIAL

## 2024-04-11 DIAGNOSIS — D05.12 INTRADUCTAL CARCINOMA IN SITU OF LEFT BREAST: ICD-10-CM

## 2024-04-11 DIAGNOSIS — Z51.0 ENCOUNTER FOR ANTINEOPLASTIC RADIATION THERAPY: ICD-10-CM

## 2024-04-11 LAB
RAD ONC MSQ ACTUAL FRACTIONS DELIVERED: 7
RAD ONC MSQ ACTUAL SESSION DELIVERED DOSE: 266 CGRAY
RAD ONC MSQ ACTUAL TOTAL DOSE: 1862 CGRAY
RAD ONC MSQ ELAPSED DAYS: 8
RAD ONC MSQ LAST DATE: NORMAL
RAD ONC MSQ PRESCRIBED FRACTIONAL DOSE: 266 CGRAY
RAD ONC MSQ PRESCRIBED NUMBER OF FRACTIONS: 16
RAD ONC MSQ PRESCRIBED TECHNIQUE: NORMAL
RAD ONC MSQ PRESCRIBED TOTAL DOSE: 4256 CGRAY
RAD ONC MSQ PRESCRIPTION PATTERN COMMENT: NORMAL
RAD ONC MSQ START DATE: NORMAL
RAD ONC MSQ TREATMENT COURSE NUMBER: 1
RAD ONC MSQ TREATMENT SITE: NORMAL

## 2024-04-11 PROCEDURE — 77387 GUIDANCE FOR RADJ TX DLVR: CPT | Performed by: RADIOLOGY

## 2024-04-11 PROCEDURE — 77014 CHG CT GUIDANCE RADIATION THERAPY FLDS PLACEMENT: CPT | Performed by: RADIOLOGY

## 2024-04-11 PROCEDURE — 77412 RADIATION TX DELIVERY LVL 3: CPT | Performed by: RADIOLOGY

## 2024-04-12 ENCOUNTER — RADIATION ONCOLOGY OTV (OUTPATIENT)
Dept: RADIATION ONCOLOGY | Facility: CLINIC | Age: 59
End: 2024-04-12
Payer: COMMERCIAL

## 2024-04-12 VITALS
WEIGHT: 209 LBS | BODY MASS INDEX: 35.87 KG/M2 | RESPIRATION RATE: 16 BRPM | DIASTOLIC BLOOD PRESSURE: 84 MMHG | OXYGEN SATURATION: 98 % | HEART RATE: 59 BPM | TEMPERATURE: 98.3 F | SYSTOLIC BLOOD PRESSURE: 136 MMHG

## 2024-04-12 LAB
RAD ONC MSQ ACTUAL FRACTIONS DELIVERED: 8
RAD ONC MSQ ACTUAL SESSION DELIVERED DOSE: 266 CGRAY
RAD ONC MSQ ACTUAL TOTAL DOSE: 2128 CGRAY
RAD ONC MSQ ELAPSED DAYS: 9
RAD ONC MSQ LAST DATE: NORMAL
RAD ONC MSQ PRESCRIBED FRACTIONAL DOSE: 266 CGRAY
RAD ONC MSQ PRESCRIBED NUMBER OF FRACTIONS: 16
RAD ONC MSQ PRESCRIBED TECHNIQUE: NORMAL
RAD ONC MSQ PRESCRIBED TOTAL DOSE: 4256 CGRAY
RAD ONC MSQ PRESCRIPTION PATTERN COMMENT: NORMAL
RAD ONC MSQ START DATE: NORMAL
RAD ONC MSQ TREATMENT COURSE NUMBER: 1
RAD ONC MSQ TREATMENT SITE: NORMAL

## 2024-04-12 PROCEDURE — 77014 CHG CT GUIDANCE RADIATION THERAPY FLDS PLACEMENT: CPT | Performed by: RADIOLOGY

## 2024-04-12 PROCEDURE — 77387 GUIDANCE FOR RADJ TX DLVR: CPT | Performed by: RADIOLOGY

## 2024-04-12 PROCEDURE — 77412 RADIATION TX DELIVERY LVL 3: CPT | Performed by: RADIOLOGY

## 2024-04-12 PROCEDURE — 77427 RADIATION TX MANAGEMENT X5: CPT | Performed by: RADIOLOGY

## 2024-04-12 ASSESSMENT — PAIN SCALES - GENERAL: PAINLEVEL: 0-NO PAIN

## 2024-04-12 NOTE — PROGRESS NOTES
Radiation Oncology On Treatment Visit    Patient Name:  Heidi Quiroz  MRN:  [id number]  :  1965    Referring Provider: No ref. provider found  Primary Care Provider: Holden Hare DO  Care Team: Patient Care Team:  Holden Hare DO as PCP - General (Internal Medicine)  Holden Hare DO as PCP - [employer] PCP    Date of Service: 2024     Diagnosis:   Specialty Problems    None    Treatment Summary:  Radiation Treatments       Active   Lt breast (Started on 4/3/2024)   Most recent fraction: 266 cGy given on 2024   Total given: 2,128 cGy / 4,256 cGy  (8 of 16 fractions)   Elapsed Days: 9   Technique: Opposed Tangential           Completed   No historical radiation treatments to show.             SUBJECTIVE: Tenderness around the nipple.       OBJECTIVE:   Vital Signs:  /84 (BP Location: Right arm, Patient Position: Sitting, BP Cuff Size: Adult)   Pulse 59   Temp 36.8 °C (98.3 °F) (Core)   Resp 16   Wt 94.8 kg (209 lb)   LMP  (LMP Unknown)   SpO2 98%   BMI 35.87 kg/m²    Pain Scale: The patient's current pain level was assessed.  They report currently having a pain of 0 out of 10.    Other Pertinent Findings: Mild erythema, skin intact.    Toxicity Assessment          2024    16:33 2024    16:35   Toxicity Assessment   Adverse Events Reviewed (WDL) No (Exceptions to WDL) No (Exceptions to WDL)   Treatment Site Breast Breast   Anorexia Grade 0 Grade 0   Dermatitis Radiation Grade 0       skin intact.  given aquaphor and skintegrity with instruction of use. Grade 0       advised to start using aquaphor and/or skintegrity daily   Fatigue Grade 0 Grade 0   Breast Pain Grade 0 Grade 0       some nipple tenderness   Lymphedema Grade 0 Grade 0        Assessment / Plan:  The patient is tolerating radiation therapy as anticipated.  Continue per current treatment plan.   Chart and films reviewed and approved.

## 2024-04-15 ENCOUNTER — HOSPITAL ENCOUNTER (OUTPATIENT)
Dept: RADIATION ONCOLOGY | Facility: CLINIC | Age: 59
Setting detail: RADIATION/ONCOLOGY SERIES
Discharge: HOME | End: 2024-04-15
Payer: COMMERCIAL

## 2024-04-15 DIAGNOSIS — D05.12 INTRADUCTAL CARCINOMA IN SITU OF LEFT BREAST: ICD-10-CM

## 2024-04-15 DIAGNOSIS — Z51.0 ENCOUNTER FOR ANTINEOPLASTIC RADIATION THERAPY: ICD-10-CM

## 2024-04-15 LAB
RAD ONC MSQ ACTUAL FRACTIONS DELIVERED: 9
RAD ONC MSQ ACTUAL SESSION DELIVERED DOSE: 266 CGRAY
RAD ONC MSQ ACTUAL TOTAL DOSE: 2394 CGRAY
RAD ONC MSQ ELAPSED DAYS: 12
RAD ONC MSQ LAST DATE: NORMAL
RAD ONC MSQ PRESCRIBED FRACTIONAL DOSE: 266 CGRAY
RAD ONC MSQ PRESCRIBED NUMBER OF FRACTIONS: 16
RAD ONC MSQ PRESCRIBED TECHNIQUE: NORMAL
RAD ONC MSQ PRESCRIBED TOTAL DOSE: 4256 CGRAY
RAD ONC MSQ PRESCRIPTION PATTERN COMMENT: NORMAL
RAD ONC MSQ START DATE: NORMAL
RAD ONC MSQ TREATMENT COURSE NUMBER: 1
RAD ONC MSQ TREATMENT SITE: NORMAL

## 2024-04-15 PROCEDURE — 77336 RADIATION PHYSICS CONSULT: CPT | Performed by: RADIOLOGY

## 2024-04-15 PROCEDURE — 77014 CHG CT GUIDANCE RADIATION THERAPY FLDS PLACEMENT: CPT | Performed by: RADIOLOGY

## 2024-04-15 PROCEDURE — 77412 RADIATION TX DELIVERY LVL 3: CPT | Performed by: RADIOLOGY

## 2024-04-15 PROCEDURE — 77387 GUIDANCE FOR RADJ TX DLVR: CPT | Performed by: RADIOLOGY

## 2024-04-16 ENCOUNTER — HOSPITAL ENCOUNTER (OUTPATIENT)
Dept: RADIATION ONCOLOGY | Facility: CLINIC | Age: 59
Setting detail: RADIATION/ONCOLOGY SERIES
Discharge: HOME | End: 2024-04-16
Payer: COMMERCIAL

## 2024-04-16 DIAGNOSIS — Z51.0 ENCOUNTER FOR ANTINEOPLASTIC RADIATION THERAPY: ICD-10-CM

## 2024-04-16 DIAGNOSIS — D05.12 INTRADUCTAL CARCINOMA IN SITU OF LEFT BREAST: ICD-10-CM

## 2024-04-16 LAB
RAD ONC MSQ ACTUAL FRACTIONS DELIVERED: 10
RAD ONC MSQ ACTUAL SESSION DELIVERED DOSE: 266 CGRAY
RAD ONC MSQ ACTUAL TOTAL DOSE: 2660 CGRAY
RAD ONC MSQ ELAPSED DAYS: 13
RAD ONC MSQ LAST DATE: NORMAL
RAD ONC MSQ PRESCRIBED FRACTIONAL DOSE: 266 CGRAY
RAD ONC MSQ PRESCRIBED NUMBER OF FRACTIONS: 16
RAD ONC MSQ PRESCRIBED TECHNIQUE: NORMAL
RAD ONC MSQ PRESCRIBED TOTAL DOSE: 4256 CGRAY
RAD ONC MSQ PRESCRIPTION PATTERN COMMENT: NORMAL
RAD ONC MSQ START DATE: NORMAL
RAD ONC MSQ TREATMENT COURSE NUMBER: 1
RAD ONC MSQ TREATMENT SITE: NORMAL

## 2024-04-16 PROCEDURE — 77014 CHG CT GUIDANCE RADIATION THERAPY FLDS PLACEMENT: CPT | Performed by: RADIOLOGY

## 2024-04-16 PROCEDURE — 77412 RADIATION TX DELIVERY LVL 3: CPT | Performed by: RADIOLOGY

## 2024-04-16 PROCEDURE — 77387 GUIDANCE FOR RADJ TX DLVR: CPT | Performed by: RADIOLOGY

## 2024-04-17 ENCOUNTER — HOSPITAL ENCOUNTER (OUTPATIENT)
Dept: RADIATION ONCOLOGY | Facility: CLINIC | Age: 59
Setting detail: RADIATION/ONCOLOGY SERIES
Discharge: HOME | End: 2024-04-17
Payer: COMMERCIAL

## 2024-04-17 ENCOUNTER — TELEPHONE (OUTPATIENT)
Dept: GASTROENTEROLOGY | Facility: HOSPITAL | Age: 59
End: 2024-04-17

## 2024-04-17 DIAGNOSIS — D05.12 INTRADUCTAL CARCINOMA IN SITU OF LEFT BREAST: ICD-10-CM

## 2024-04-17 DIAGNOSIS — Z00.00 ENCOUNTER FOR GENERAL ADULT MEDICAL EXAMINATION WITHOUT ABNORMAL FINDINGS: ICD-10-CM

## 2024-04-17 DIAGNOSIS — Z51.0 ENCOUNTER FOR ANTINEOPLASTIC RADIATION THERAPY: ICD-10-CM

## 2024-04-17 LAB
RAD ONC MSQ ACTUAL FRACTIONS DELIVERED: 11
RAD ONC MSQ ACTUAL SESSION DELIVERED DOSE: 266 CGRAY
RAD ONC MSQ ACTUAL TOTAL DOSE: 2926 CGRAY
RAD ONC MSQ ELAPSED DAYS: 14
RAD ONC MSQ LAST DATE: NORMAL
RAD ONC MSQ PRESCRIBED FRACTIONAL DOSE: 266 CGRAY
RAD ONC MSQ PRESCRIBED NUMBER OF FRACTIONS: 16
RAD ONC MSQ PRESCRIBED TECHNIQUE: NORMAL
RAD ONC MSQ PRESCRIBED TOTAL DOSE: 4256 CGRAY
RAD ONC MSQ PRESCRIPTION PATTERN COMMENT: NORMAL
RAD ONC MSQ START DATE: NORMAL
RAD ONC MSQ TREATMENT COURSE NUMBER: 1
RAD ONC MSQ TREATMENT SITE: NORMAL

## 2024-04-17 PROCEDURE — 77387 GUIDANCE FOR RADJ TX DLVR: CPT | Performed by: RADIOLOGY

## 2024-04-17 PROCEDURE — 77014 CHG CT GUIDANCE RADIATION THERAPY FLDS PLACEMENT: CPT | Performed by: RADIOLOGY

## 2024-04-17 PROCEDURE — 77412 RADIATION TX DELIVERY LVL 3: CPT | Performed by: RADIOLOGY

## 2024-04-17 RX ORDER — PROPRANOLOL HYDROCHLORIDE 80 MG/1
80 CAPSULE, EXTENDED RELEASE ORAL DAILY
Qty: 90 CAPSULE | Refills: 1 | Status: SHIPPED | OUTPATIENT
Start: 2024-04-17

## 2024-04-18 ENCOUNTER — HOSPITAL ENCOUNTER (OUTPATIENT)
Dept: RADIATION ONCOLOGY | Facility: CLINIC | Age: 59
Setting detail: RADIATION/ONCOLOGY SERIES
Discharge: HOME | End: 2024-04-18
Payer: COMMERCIAL

## 2024-04-18 DIAGNOSIS — D05.12 INTRADUCTAL CARCINOMA IN SITU OF LEFT BREAST: ICD-10-CM

## 2024-04-18 DIAGNOSIS — Z51.0 ENCOUNTER FOR ANTINEOPLASTIC RADIATION THERAPY: ICD-10-CM

## 2024-04-18 LAB
RAD ONC MSQ ACTUAL FRACTIONS DELIVERED: 12
RAD ONC MSQ ACTUAL SESSION DELIVERED DOSE: 266 CGRAY
RAD ONC MSQ ACTUAL TOTAL DOSE: 3192 CGRAY
RAD ONC MSQ ELAPSED DAYS: 15
RAD ONC MSQ LAST DATE: NORMAL
RAD ONC MSQ PRESCRIBED FRACTIONAL DOSE: 266 CGRAY
RAD ONC MSQ PRESCRIBED NUMBER OF FRACTIONS: 16
RAD ONC MSQ PRESCRIBED TECHNIQUE: NORMAL
RAD ONC MSQ PRESCRIBED TOTAL DOSE: 4256 CGRAY
RAD ONC MSQ PRESCRIPTION PATTERN COMMENT: NORMAL
RAD ONC MSQ START DATE: NORMAL
RAD ONC MSQ TREATMENT COURSE NUMBER: 1
RAD ONC MSQ TREATMENT SITE: NORMAL

## 2024-04-18 PROCEDURE — 77412 RADIATION TX DELIVERY LVL 3: CPT | Performed by: RADIOLOGY

## 2024-04-18 PROCEDURE — 77014 CHG CT GUIDANCE RADIATION THERAPY FLDS PLACEMENT: CPT | Performed by: RADIOLOGY

## 2024-04-18 PROCEDURE — 77387 GUIDANCE FOR RADJ TX DLVR: CPT | Performed by: RADIOLOGY

## 2024-04-19 ENCOUNTER — RADIATION ONCOLOGY OTV (OUTPATIENT)
Dept: RADIATION ONCOLOGY | Facility: CLINIC | Age: 59
End: 2024-04-19
Payer: COMMERCIAL

## 2024-04-19 ENCOUNTER — HOSPITAL ENCOUNTER (OUTPATIENT)
Dept: RADIATION ONCOLOGY | Facility: CLINIC | Age: 59
Setting detail: RADIATION/ONCOLOGY SERIES
Discharge: HOME | End: 2024-04-19
Payer: COMMERCIAL

## 2024-04-19 ENCOUNTER — TELEPHONE (OUTPATIENT)
Dept: OBSTETRICS AND GYNECOLOGY | Facility: CLINIC | Age: 59
End: 2024-04-19
Payer: COMMERCIAL

## 2024-04-19 VITALS
RESPIRATION RATE: 18 BRPM | TEMPERATURE: 96.7 F | SYSTOLIC BLOOD PRESSURE: 122 MMHG | DIASTOLIC BLOOD PRESSURE: 60 MMHG | HEART RATE: 65 BPM | WEIGHT: 208 LBS | OXYGEN SATURATION: 98 % | BODY MASS INDEX: 35.7 KG/M2

## 2024-04-19 DIAGNOSIS — Z51.0 ENCOUNTER FOR ANTINEOPLASTIC RADIATION THERAPY: ICD-10-CM

## 2024-04-19 DIAGNOSIS — D05.12 INTRADUCTAL CARCINOMA IN SITU OF LEFT BREAST: ICD-10-CM

## 2024-04-19 LAB
RAD ONC MSQ ACTUAL FRACTIONS DELIVERED: 13
RAD ONC MSQ ACTUAL SESSION DELIVERED DOSE: 266 CGRAY
RAD ONC MSQ ACTUAL TOTAL DOSE: 3458 CGRAY
RAD ONC MSQ ELAPSED DAYS: 16
RAD ONC MSQ LAST DATE: NORMAL
RAD ONC MSQ PRESCRIBED FRACTIONAL DOSE: 266 CGRAY
RAD ONC MSQ PRESCRIBED NUMBER OF FRACTIONS: 16
RAD ONC MSQ PRESCRIBED TECHNIQUE: NORMAL
RAD ONC MSQ PRESCRIBED TOTAL DOSE: 4256 CGRAY
RAD ONC MSQ PRESCRIPTION PATTERN COMMENT: NORMAL
RAD ONC MSQ START DATE: NORMAL
RAD ONC MSQ TREATMENT COURSE NUMBER: 1
RAD ONC MSQ TREATMENT SITE: NORMAL

## 2024-04-19 PROCEDURE — 77427 RADIATION TX MANAGEMENT X5: CPT | Performed by: RADIOLOGY

## 2024-04-19 PROCEDURE — 77387 GUIDANCE FOR RADJ TX DLVR: CPT | Performed by: RADIOLOGY

## 2024-04-19 PROCEDURE — 77014 CHG CT GUIDANCE RADIATION THERAPY FLDS PLACEMENT: CPT | Performed by: RADIOLOGY

## 2024-04-19 PROCEDURE — 77412 RADIATION TX DELIVERY LVL 3: CPT | Performed by: RADIOLOGY

## 2024-04-19 ASSESSMENT — PAIN SCALES - GENERAL: PAINLEVEL: 0-NO PAIN

## 2024-04-19 NOTE — TELEPHONE ENCOUNTER
Pt has had loose stool after she stopped taking metroniadozle . What should she do?Please advise thank you.

## 2024-04-19 NOTE — PROGRESS NOTES
Radiation Oncology On Treatment Visit    Patient Name:  Heidi Quiroz  MRN:  [id number]  :  1965    Referring Provider: No ref. provider found  Primary Care Provider: Holden Hare DO  Care Team: Patient Care Team:  Holden Hare DO as PCP - General (Internal Medicine)  Holden Hare DO as PCP - [employer] PCP    Date of Service: 2024     Diagnosis:   Specialty Problems    None    Treatment Summary:  Radiation Treatments       Active   Lt breast (Started on 4/3/2024)   Most recent fraction: 266 cGy given on 2024   Total given: 3,458 cGy / 4,256 cGy  (13 of 16 fractions)   Elapsed Days: 16   Technique: Opposed Tangential           Completed   No historical radiation treatments to show.             SUBJECTIVE: Mild breast pain.  No other complaints.       OBJECTIVE:   Vital Signs:  /60 (BP Location: Right arm, Patient Position: Sitting, BP Cuff Size: Adult)   Pulse 65   Temp 35.9 °C (96.7 °F) (Temporal)   Resp 18   Wt 94.3 kg (208 lb)   LMP  (LMP Unknown)   SpO2 98%   BMI 35.70 kg/m²    Pain Scale: The patient's current pain level was assessed.  They report currently having a pain of 2 out of 10.    Other Pertinent Findings: Mild erythema, skin intact.    Toxicity Assessment          2024    16:33 2024    16:35 2024    12:24   Toxicity Assessment   Adverse Events Reviewed (WDL) No (Exceptions to WDL) No (Exceptions to WDL) No (Exceptions to WDL)   Treatment Site Breast Breast Breast   Anorexia Grade 0 Grade 0 Grade 0   Dermatitis Radiation Grade 0       skin intact.  given aquaphor and skintegrity with instruction of use. Grade 0       advised to start using aquaphor and/or skintegrity daily Grade 1       faint erythema; skin intact.  using lotions daily.   Fatigue Grade 0 Grade 0 Grade 0   Breast Pain Grade 0 Grade 0       some nipple tenderness Grade 1       2/10 left breast pain.  taking tylenol PRN   Lymphedema Grade 0 Grade 0 Grade 0        Assessment / Plan:  The  patient is tolerating radiation therapy as anticipated.  Continue per current treatment plan.   Chart and films reviewed and approved.

## 2024-04-20 NOTE — TELEPHONE ENCOUNTER
I left a voicemail.  She called regarding having loose bowel movements after stopping metronidazole.  Review of the chart notes that she is currently receiving radiation therapy for breast cancer.  I believe some side effects could be loose bowel movements.  I recommend Metamucil to bulk the stools.  If she is having significant diarrhea, she should talk to oncology team.

## 2024-04-22 ENCOUNTER — HOSPITAL ENCOUNTER (OUTPATIENT)
Dept: RADIATION ONCOLOGY | Facility: CLINIC | Age: 59
Setting detail: RADIATION/ONCOLOGY SERIES
Discharge: HOME | End: 2024-04-22
Payer: COMMERCIAL

## 2024-04-22 DIAGNOSIS — D05.12 INTRADUCTAL CARCINOMA IN SITU OF LEFT BREAST: ICD-10-CM

## 2024-04-22 DIAGNOSIS — Z51.0 ENCOUNTER FOR ANTINEOPLASTIC RADIATION THERAPY: ICD-10-CM

## 2024-04-22 LAB
RAD ONC MSQ ACTUAL FRACTIONS DELIVERED: 14
RAD ONC MSQ ACTUAL SESSION DELIVERED DOSE: 266 CGRAY
RAD ONC MSQ ACTUAL TOTAL DOSE: 3724 CGRAY
RAD ONC MSQ ELAPSED DAYS: 19
RAD ONC MSQ LAST DATE: NORMAL
RAD ONC MSQ PRESCRIBED FRACTIONAL DOSE: 266 CGRAY
RAD ONC MSQ PRESCRIBED NUMBER OF FRACTIONS: 16
RAD ONC MSQ PRESCRIBED TECHNIQUE: NORMAL
RAD ONC MSQ PRESCRIBED TOTAL DOSE: 4256 CGRAY
RAD ONC MSQ PRESCRIPTION PATTERN COMMENT: NORMAL
RAD ONC MSQ START DATE: NORMAL
RAD ONC MSQ TREATMENT COURSE NUMBER: 1
RAD ONC MSQ TREATMENT SITE: NORMAL

## 2024-04-22 PROCEDURE — 77336 RADIATION PHYSICS CONSULT: CPT | Performed by: RADIOLOGY

## 2024-04-22 PROCEDURE — 77412 RADIATION TX DELIVERY LVL 3: CPT | Performed by: RADIOLOGY

## 2024-04-22 PROCEDURE — 77387 GUIDANCE FOR RADJ TX DLVR: CPT | Performed by: RADIOLOGY

## 2024-04-22 PROCEDURE — 77014 CHG CT GUIDANCE RADIATION THERAPY FLDS PLACEMENT: CPT | Performed by: RADIOLOGY

## 2024-04-23 ENCOUNTER — HOSPITAL ENCOUNTER (OUTPATIENT)
Dept: RADIATION ONCOLOGY | Facility: CLINIC | Age: 59
Setting detail: RADIATION/ONCOLOGY SERIES
Discharge: HOME | End: 2024-04-23
Payer: COMMERCIAL

## 2024-04-23 DIAGNOSIS — D05.12 INTRADUCTAL CARCINOMA IN SITU OF LEFT BREAST: ICD-10-CM

## 2024-04-23 DIAGNOSIS — Z51.0 ENCOUNTER FOR ANTINEOPLASTIC RADIATION THERAPY: ICD-10-CM

## 2024-04-23 DIAGNOSIS — K51.00 ULCERATIVE PANCOLITIS WITHOUT COMPLICATION (MULTI): Primary | ICD-10-CM

## 2024-04-23 LAB
RAD ONC MSQ ACTUAL FRACTIONS DELIVERED: 15
RAD ONC MSQ ACTUAL SESSION DELIVERED DOSE: 266 CGRAY
RAD ONC MSQ ACTUAL TOTAL DOSE: 3990 CGRAY
RAD ONC MSQ ELAPSED DAYS: 20
RAD ONC MSQ LAST DATE: NORMAL
RAD ONC MSQ PRESCRIBED FRACTIONAL DOSE: 266 CGRAY
RAD ONC MSQ PRESCRIBED NUMBER OF FRACTIONS: 16
RAD ONC MSQ PRESCRIBED TECHNIQUE: NORMAL
RAD ONC MSQ PRESCRIBED TOTAL DOSE: 4256 CGRAY
RAD ONC MSQ PRESCRIPTION PATTERN COMMENT: NORMAL
RAD ONC MSQ START DATE: NORMAL
RAD ONC MSQ TREATMENT COURSE NUMBER: 1
RAD ONC MSQ TREATMENT SITE: NORMAL

## 2024-04-23 PROCEDURE — 77280 THER RAD SIMULAJ FIELD SMPL: CPT | Performed by: RADIOLOGY

## 2024-04-23 PROCEDURE — 77412 RADIATION TX DELIVERY LVL 3: CPT | Performed by: RADIOLOGY

## 2024-04-24 ENCOUNTER — HOSPITAL ENCOUNTER (OUTPATIENT)
Dept: RADIATION ONCOLOGY | Facility: CLINIC | Age: 59
Setting detail: RADIATION/ONCOLOGY SERIES
Discharge: HOME | End: 2024-04-24
Payer: COMMERCIAL

## 2024-04-24 ENCOUNTER — RADIATION ONCOLOGY OTV (OUTPATIENT)
Dept: RADIATION ONCOLOGY | Facility: CLINIC | Age: 59
End: 2024-04-24
Payer: COMMERCIAL

## 2024-04-24 VITALS
OXYGEN SATURATION: 97 % | WEIGHT: 210.1 LBS | SYSTOLIC BLOOD PRESSURE: 147 MMHG | BODY MASS INDEX: 36.06 KG/M2 | DIASTOLIC BLOOD PRESSURE: 88 MMHG | TEMPERATURE: 97.2 F | HEART RATE: 62 BPM | RESPIRATION RATE: 17 BRPM

## 2024-04-24 DIAGNOSIS — D05.12 INTRADUCTAL CARCINOMA IN SITU OF LEFT BREAST: ICD-10-CM

## 2024-04-24 DIAGNOSIS — Z51.0 ENCOUNTER FOR ANTINEOPLASTIC RADIATION THERAPY: ICD-10-CM

## 2024-04-24 LAB
RAD ONC MSQ ACTUAL FRACTIONS DELIVERED: 16
RAD ONC MSQ ACTUAL SESSION DELIVERED DOSE: 266 CGRAY
RAD ONC MSQ ACTUAL TOTAL DOSE: 4256 CGRAY
RAD ONC MSQ ELAPSED DAYS: 21
RAD ONC MSQ LAST DATE: NORMAL
RAD ONC MSQ PRESCRIBED FRACTIONAL DOSE: 266 CGRAY
RAD ONC MSQ PRESCRIBED NUMBER OF FRACTIONS: 16
RAD ONC MSQ PRESCRIBED TECHNIQUE: NORMAL
RAD ONC MSQ PRESCRIBED TOTAL DOSE: 4256 CGRAY
RAD ONC MSQ PRESCRIPTION PATTERN COMMENT: NORMAL
RAD ONC MSQ START DATE: NORMAL
RAD ONC MSQ TREATMENT COURSE NUMBER: 1
RAD ONC MSQ TREATMENT SITE: NORMAL

## 2024-04-24 PROCEDURE — 77387 GUIDANCE FOR RADJ TX DLVR: CPT | Performed by: RADIOLOGY

## 2024-04-24 PROCEDURE — 77412 RADIATION TX DELIVERY LVL 3: CPT | Performed by: RADIOLOGY

## 2024-04-24 PROCEDURE — 77014 CHG CT GUIDANCE RADIATION THERAPY FLDS PLACEMENT: CPT | Performed by: RADIOLOGY

## 2024-04-24 PROCEDURE — 77427 RADIATION TX MANAGEMENT X5: CPT | Performed by: RADIOLOGY

## 2024-04-24 ASSESSMENT — PAIN SCALES - GENERAL: PAINLEVEL: 0-NO PAIN

## 2024-04-24 NOTE — PROGRESS NOTES
Radiation Oncology On Treatment Visit    Patient Name:  Heidi Quiroz  MRN:  [id number]  :  1965    Referring Provider: No ref. provider found  Primary Care Provider: Holden Hare DO  Care Team: Patient Care Team:  Holden Hare DO as PCP - General (Internal Medicine)  Holden Hare DO as PCP - [facility] PCP    Date of Service: 2024     Diagnosis:   Specialty Problems    None    Treatment Summary:  Radiation Treatments       Active   No active radiation treatments to show.     Completed   Lt breast (Started on 4/3/2024)   Most recent fraction: 266 cGy given on 2024   Total given: 4,256 cGy / 4,256 cGy  (16 of 16 fractions)   Elapsed Days: 21   Technique: Opposed Tangential                   SUBJECTIVE: No complaints.      OBJECTIVE:   Vital Signs:  /88 (BP Location: Left arm, Patient Position: Sitting, BP Cuff Size: Adult)   Pulse 62   Temp 36.2 °C (97.2 °F) (Core)   Resp 17   Wt 95.3 kg (210 lb 1.6 oz)   LMP  (LMP Unknown)   SpO2 97%   BMI 36.06 kg/m²    Pain Scale: The patient's current pain level was assessed.  They report currently having a pain of 0 out of 10.    Other Pertinent Findings: Mild erythema, skin intact.    Toxicity Assessment          2024    16:33 2024    16:35 2024    12:24 2024    16:43   Toxicity Assessment   Adverse Events Reviewed (WDL) No (Exceptions to WDL) No (Exceptions to WDL) No (Exceptions to WDL) No (Exceptions to WDL)   Treatment Site Breast Breast Breast Breast   Anorexia Grade 0 Grade 0 Grade 0 Grade 0   Dermatitis Radiation Grade 0       skin intact.  given aquaphor and skintegrity with instruction of use. Grade 0       advised to start using aquaphor and/or skintegrity daily Grade 1       faint erythema; skin intact.  using lotions daily. Grade 1       using lotions daily.  mild erythema; skin intact.   Fatigue Grade 0 Grade 0 Grade 0 Grade 1   Breast Pain Grade 0 Grade 0       some nipple tenderness Grade 1       2/10 left  breast pain.  taking tylenol PRN Grade 1       intermittent soreness of breast.  3/10 pain.  taking tylenol   Edema Limbs    Grade 0   Lymphedema Grade 0 Grade 0 Grade 0 Grade 0        Assessment / Plan:  The patient is tolerating radiation therapy as anticipated.  Continue per current treatment plan.   Chart and films reviewed and approved.

## 2024-04-25 ENCOUNTER — HOSPITAL ENCOUNTER (OUTPATIENT)
Dept: RADIATION ONCOLOGY | Facility: CLINIC | Age: 59
Setting detail: RADIATION/ONCOLOGY SERIES
Discharge: HOME | End: 2024-04-25
Payer: COMMERCIAL

## 2024-04-25 ENCOUNTER — OFFICE VISIT (OUTPATIENT)
Dept: GYNECOLOGIC ONCOLOGY | Facility: CLINIC | Age: 59
End: 2024-04-25
Payer: COMMERCIAL

## 2024-04-25 ENCOUNTER — PREP FOR PROCEDURE (OUTPATIENT)
Dept: OPERATING ROOM | Facility: HOSPITAL | Age: 59
End: 2024-04-25

## 2024-04-25 VITALS
WEIGHT: 207.12 LBS | TEMPERATURE: 96.3 F | SYSTOLIC BLOOD PRESSURE: 127 MMHG | DIASTOLIC BLOOD PRESSURE: 78 MMHG | BODY MASS INDEX: 36.7 KG/M2 | HEART RATE: 62 BPM | RESPIRATION RATE: 18 BRPM | OXYGEN SATURATION: 97 % | HEIGHT: 63 IN

## 2024-04-25 DIAGNOSIS — Z51.0 ENCOUNTER FOR ANTINEOPLASTIC RADIATION THERAPY: ICD-10-CM

## 2024-04-25 DIAGNOSIS — C54.1 ENDOMETRIAL CANCER DETERMINED BY UTERINE BIOPSY (MULTI): Primary | ICD-10-CM

## 2024-04-25 DIAGNOSIS — Z71.89 ENCOUNTER FOR SURGICAL COUNSELING: Primary | ICD-10-CM

## 2024-04-25 DIAGNOSIS — D05.12 INTRADUCTAL CARCINOMA IN SITU OF LEFT BREAST: ICD-10-CM

## 2024-04-25 DIAGNOSIS — C54.1 ENDOMETRIAL CANCER DETERMINED BY UTERINE BIOPSY (MULTI): ICD-10-CM

## 2024-04-25 LAB
RAD ONC MSQ ACTUAL FRACTIONS DELIVERED: 1
RAD ONC MSQ ACTUAL SESSION DELIVERED DOSE: 250 CGRAY
RAD ONC MSQ ACTUAL TOTAL DOSE: 250 CGRAY
RAD ONC MSQ ELAPSED DAYS: 0
RAD ONC MSQ LAST DATE: NORMAL
RAD ONC MSQ PRESCRIBED FRACTIONAL DOSE: 250 CGRAY
RAD ONC MSQ PRESCRIBED NUMBER OF FRACTIONS: 4
RAD ONC MSQ PRESCRIBED TECHNIQUE: NORMAL
RAD ONC MSQ PRESCRIBED TOTAL DOSE: 1000 CGRAY
RAD ONC MSQ PRESCRIPTION PATTERN COMMENT: NORMAL
RAD ONC MSQ START DATE: NORMAL
RAD ONC MSQ TREATMENT COURSE NUMBER: 1
RAD ONC MSQ TREATMENT SITE: NORMAL

## 2024-04-25 PROCEDURE — 77387 GUIDANCE FOR RADJ TX DLVR: CPT | Performed by: RADIOLOGY

## 2024-04-25 PROCEDURE — 3078F DIAST BP <80 MM HG: CPT | Performed by: STUDENT IN AN ORGANIZED HEALTH CARE EDUCATION/TRAINING PROGRAM

## 2024-04-25 PROCEDURE — 3074F SYST BP LT 130 MM HG: CPT | Performed by: STUDENT IN AN ORGANIZED HEALTH CARE EDUCATION/TRAINING PROGRAM

## 2024-04-25 PROCEDURE — 99205 OFFICE O/P NEW HI 60 MIN: CPT | Performed by: STUDENT IN AN ORGANIZED HEALTH CARE EDUCATION/TRAINING PROGRAM

## 2024-04-25 PROCEDURE — 1036F TOBACCO NON-USER: CPT | Performed by: STUDENT IN AN ORGANIZED HEALTH CARE EDUCATION/TRAINING PROGRAM

## 2024-04-25 PROCEDURE — 77412 RADIATION TX DELIVERY LVL 3: CPT | Performed by: RADIOLOGY

## 2024-04-25 PROCEDURE — 99215 OFFICE O/P EST HI 40 MIN: CPT | Performed by: STUDENT IN AN ORGANIZED HEALTH CARE EDUCATION/TRAINING PROGRAM

## 2024-04-25 PROCEDURE — 77014 CHG CT GUIDANCE RADIATION THERAPY FLDS PLACEMENT: CPT | Performed by: STUDENT IN AN ORGANIZED HEALTH CARE EDUCATION/TRAINING PROGRAM

## 2024-04-25 RX ORDER — ACETAMINOPHEN 325 MG/1
975 TABLET ORAL ONCE
Status: CANCELLED | OUTPATIENT
Start: 2024-04-25 | End: 2024-04-25

## 2024-04-25 RX ORDER — CELECOXIB 200 MG/1
400 CAPSULE ORAL ONCE
Status: CANCELLED | OUTPATIENT
Start: 2024-04-25 | End: 2024-04-25

## 2024-04-25 RX ORDER — GABAPENTIN 600 MG/1
600 TABLET ORAL ONCE
Status: CANCELLED | OUTPATIENT
Start: 2024-04-25 | End: 2024-04-25

## 2024-04-25 RX ORDER — HEPARIN SODIUM 5000 [USP'U]/ML
5000 INJECTION, SOLUTION INTRAVENOUS; SUBCUTANEOUS ONCE
Status: CANCELLED | OUTPATIENT
Start: 2024-04-25 | End: 2024-04-25

## 2024-04-25 ASSESSMENT — ENCOUNTER SYMPTOMS
OCCASIONAL FEELINGS OF UNSTEADINESS: 0
FREQUENCY: 0
NAUSEA: 0
DIARRHEA: 0
SHORTNESS OF BREATH: 0
VOMITING: 0
DYSURIA: 0
DEPRESSION: 0
CHILLS: 0
LOSS OF SENSATION IN FEET: 0
FEVER: 0
ABDOMINAL PAIN: 0
CONSTIPATION: 0

## 2024-04-25 ASSESSMENT — PATIENT HEALTH QUESTIONNAIRE - PHQ9
1. LITTLE INTEREST OR PLEASURE IN DOING THINGS: NOT AT ALL
2. FEELING DOWN, DEPRESSED OR HOPELESS: NOT AT ALL
SUM OF ALL RESPONSES TO PHQ9 QUESTIONS 1 AND 2: 0

## 2024-04-25 ASSESSMENT — PAIN SCALES - GENERAL: PAINLEVEL: 0-NO PAIN

## 2024-04-25 NOTE — PROGRESS NOTES
"Patient ID: Chelita Quiroz is a 59 y.o. female.  Referring Physician: Tamika Castellano MD  [id number] [place] [address]  Primary Care Provider: Holden Hare DO      Subjective    HPI  HPI:   Heidi \"Chelita\" Gabriella is a 59 y.o. Fm with a PMHx notable for left breast DCIS (s/p partial mastectomy and currently undergoing radiation) presenting as a new patient today for endometrial adenocarcinoma endometrioid type (FIGO grade 1, p53-wt, MMR-p) on endometrial biopsy.    She states that for a few months she was having bleeding from down below that she believed was initially coming from the rectum. She states that when she noticed it was coming from her vagina she had approximately 2 weeks of postmenopausal bleeding before seeing Dr. Castellano. She had a pelvic US done with Dr. Castellano which showed a thickened endometrial stripe (1.9 cm). An endometrial biopsy was then done which showed endometrial adenocarcinoma, endometrioid type with squamous and mucinous differentiation, FIGO grade 1. The adenocarcinoma is also strongly positive for ER (80%) and FL (80%), p53-wt and MMR-p. Today, she denies any further vaginal bleeding since the biopsy. She endorses some mild pelvic pain, which she is unsure if is true pelvic pain versus related to her IBD. She endorses some vaginal itching which she states Dr. Castellano prescribed metronidazole for. She states that the itching improved while taking the metronidazole but has since recurred. Endorses some mild mucinous discharge with no odor. She endorses some mild fatigue from her radiation therapy.     They deny fever, chills, constipation, diarrhea, vaginal bleeding, nausea, vomiting, or any other symptoms other than those listed in the interval history.    PMH:  - recent diagnosis of left breast DCIS, grade 3, ER negative s/p partial mastectomy and undergoing radiation- states that her last dose of radiation is next week  - GERD  - hypothyroidism I/s/o " "multinodular goiter   - HTN  - cholelithiasis  - hearing loss  - HLD  - Ulcerative colitis     PSH:  - left breast partial mastectomy (2/15/2024, with reexcision on )  - L total hip arthroplasty  - D&C    OBHx:  The patient is a . She underwent menopause at age 49. She used COCs for 0 years (states she took progesterone in her teens for 1-2 months). She did not use HRT. She underwent menarche at age 12    Social:  They deny recreational drug use. Endorses occasional alcohol use. History of tobacco use with cigarettes 0.5 ppd for 8 years. The patient lives at home with alone. The patient works at home.     FamHx:  - Mother: lung cancer at 72  - Father: liver cancer at 62  - Brother: lung cancer at 63  - Brother: lung cancer at 66  Their history is otherwise negative for a history of breast, ovarian, uterine, colon, pancreatic, and GI cancer.     Screening:  Cervical cancer: 10/3/2022: negative, HPV high risk negative   Mammogram:  24: post procedure mammogram   Colonoscopy: 2022: ulcerative colitis, follow up in 5-7 years     Review of Systems   Constitutional:  Negative for chills and fever.   Respiratory:  Negative for shortness of breath.    Cardiovascular:  Negative for chest pain.   Gastrointestinal:  Negative for abdominal pain, constipation, diarrhea, nausea and vomiting.        Endorses some recent loose stools but no diarrhea   Genitourinary:  Positive for pelvic pain. Negative for dysuria and frequency.    Skin:  Positive for rash.        Rash underneath pannus         Objective   BSA: 2.04 meters squared  /78   Pulse 62   Temp 35.7 °C (96.3 °F)   Resp 18   Ht (S) 1.602 m (5' 3.07\")   Wt 93.9 kg (207 lb 2 oz)   LMP  (LMP Unknown)   SpO2 97%   BMI 36.61 kg/m²      Family History   Problem Relation Name Age of Onset    Heart attack Mother      Lung cancer Mother      Hypertension Father      Liver cancer Father      Hypertension Brother      Lung cancer Brother      Other " (psoriatic arthritis) Son         Heidi Quiroz  reports that she quit smoking about 29 years ago. Her smoking use included cigarettes. She started smoking about 36 years ago. She has a 3.5 pack-year smoking history. She has been exposed to tobacco smoke. She has never used smokeless tobacco.  She  reports current alcohol use.  She  reports no history of drug use.    Physical Exam  Constitutional:       General: She is not in acute distress.     Appearance: Normal appearance. She is not ill-appearing.   HENT:      Head: Normocephalic and atraumatic.   Cardiovascular:      Rate and Rhythm: Normal rate and regular rhythm.      Heart sounds: No murmur heard.     No friction rub. No gallop.   Pulmonary:      Effort: No respiratory distress.      Breath sounds: No stridor. No wheezing, rhonchi or rales.   Abdominal:      General: Abdomen is flat.      Palpations: Abdomen is soft.      Tenderness: There is no abdominal tenderness.   Genitourinary:     Comments: Normal external female genitalia without lesions or masses  Speculum exam: Smooth vagina without lesions or masses, some minimal white discharge  Bimanual exam: smooth vagina without lesions or masses, normal contour and size of uterus, normal adnexa  Musculoskeletal:         General: No tenderness.      Right lower leg: No edema.      Left lower leg: No edema.   Neurological:      Mental Status: She is alert.         Performance Status:  Asymptomatic    Assessment/Plan      Oncology History   Ductal carcinoma in situ (DCIS) of left breast   1/31/2024 Initial Diagnosis    Ductal carcinoma in situ (DCIS) of left breast     3/6/2024 Cancer Staged    Staging form: Breast, AJCC 8th Edition, Clinical stage from 3/6/2024: cTis (DCIS), cM0, G3, ER- - Signed by Samia Banda MD on 3/6/2024     3/6/2024 Cancer Staged    Staging form: Breast, AJCC 8th Edition, Pathologic stage from 3/6/2024: Stage 0 (pTis (DCIS), pN0, cM0, G3, ER-) - Signed by Samia Banda MD on 3/6/2024    "      Heidi \"Chelita\" Gabriella is a 59 y.o. Fm with a PMHx notable for left breast DCIS (s/p partial mastectomy and currently undergoing radiation) presenting as a new patient today for endometrial adenocarcinoma endometrioid type (FIGO grade 1, p53-wt, MMR-p) on endometrial biopsy.    # Endometrial cancer  - Discussed the significance of histologic subtype, grade and stage  - Discussed management options including medical, non-surgical, and surgical options.   - Discussed recommendation for surgery with a hysterectomy, BSO, sentinel lymph node mapping and biopsy with possible need for unilateral or bilateral lymphadenectomy. We discussed my recommendation for a minimally invasive approach  - Discussed surgical risks, anticipated hospital stay, and recovery   - Plan for TLH, BSO, SLN on May 17  - She will need PAT in the setting of her recent radiation therapy   - She is a candidate for same day DC     # Vaginal itching  - Discussed that this could be due to her recent PMB or normal atrophy of the vagina with aging  - Did not see any findings on physical exam suggestive of infectious process  - Will readdress at follow up visit if ongoing     Seen and discussed with Dr. Eckert,  Haydee Patel, MS4    I, or a resident under my supervision, was present with the medical student who participated in the documentation of this note.  I have personally seen and examined the patient and performed the medical decision-making components. I have reviewed the medical student documentation and/or resident documentation and verified the findings in the note as written with additions or exceptions as stated in the body of the note.    Anahy Eckert MD    "

## 2024-04-26 ENCOUNTER — HOSPITAL ENCOUNTER (OUTPATIENT)
Dept: RADIATION ONCOLOGY | Facility: CLINIC | Age: 59
Setting detail: RADIATION/ONCOLOGY SERIES
Discharge: HOME | End: 2024-04-26
Payer: COMMERCIAL

## 2024-04-26 DIAGNOSIS — D05.12 INTRADUCTAL CARCINOMA IN SITU OF LEFT BREAST: ICD-10-CM

## 2024-04-26 DIAGNOSIS — Z51.0 ENCOUNTER FOR ANTINEOPLASTIC RADIATION THERAPY: ICD-10-CM

## 2024-04-26 LAB
RAD ONC MSQ ACTUAL FRACTIONS DELIVERED: 2
RAD ONC MSQ ACTUAL SESSION DELIVERED DOSE: 250 CGRAY
RAD ONC MSQ ACTUAL TOTAL DOSE: 500 CGRAY
RAD ONC MSQ ELAPSED DAYS: 1
RAD ONC MSQ LAST DATE: NORMAL
RAD ONC MSQ PRESCRIBED FRACTIONAL DOSE: 250 CGRAY
RAD ONC MSQ PRESCRIBED NUMBER OF FRACTIONS: 4
RAD ONC MSQ PRESCRIBED TECHNIQUE: NORMAL
RAD ONC MSQ PRESCRIBED TOTAL DOSE: 1000 CGRAY
RAD ONC MSQ PRESCRIPTION PATTERN COMMENT: NORMAL
RAD ONC MSQ START DATE: NORMAL
RAD ONC MSQ TREATMENT COURSE NUMBER: 1
RAD ONC MSQ TREATMENT SITE: NORMAL

## 2024-04-26 PROCEDURE — 77014 CHG CT GUIDANCE RADIATION THERAPY FLDS PLACEMENT: CPT | Performed by: STUDENT IN AN ORGANIZED HEALTH CARE EDUCATION/TRAINING PROGRAM

## 2024-04-26 PROCEDURE — 77387 GUIDANCE FOR RADJ TX DLVR: CPT | Performed by: STUDENT IN AN ORGANIZED HEALTH CARE EDUCATION/TRAINING PROGRAM

## 2024-04-26 PROCEDURE — 77412 RADIATION TX DELIVERY LVL 3: CPT | Performed by: RADIOLOGY

## 2024-04-29 ENCOUNTER — HOSPITAL ENCOUNTER (OUTPATIENT)
Dept: RADIATION ONCOLOGY | Facility: CLINIC | Age: 59
Setting detail: RADIATION/ONCOLOGY SERIES
Discharge: HOME | End: 2024-04-29
Payer: COMMERCIAL

## 2024-04-29 ENCOUNTER — TELEMEDICINE CLINICAL SUPPORT (OUTPATIENT)
Dept: GENETICS | Facility: CLINIC | Age: 59
End: 2024-04-29
Payer: COMMERCIAL

## 2024-04-29 DIAGNOSIS — D05.12 INTRADUCTAL CARCINOMA IN SITU OF LEFT BREAST: ICD-10-CM

## 2024-04-29 DIAGNOSIS — Z51.0 ENCOUNTER FOR ANTINEOPLASTIC RADIATION THERAPY: ICD-10-CM

## 2024-04-29 DIAGNOSIS — Z71.83 ENCOUNTER FOR NONPROCREATIVE GENETIC COUNSELING: Primary | ICD-10-CM

## 2024-04-29 DIAGNOSIS — D05.12 DUCTAL CARCINOMA IN SITU (DCIS) OF LEFT BREAST: ICD-10-CM

## 2024-04-29 DIAGNOSIS — Z80.1 FAMILY HX OF LUNG CANCER: ICD-10-CM

## 2024-04-29 DIAGNOSIS — C54.1 ENDOMETRIAL CANCER DETERMINED BY UTERINE BIOPSY (MULTI): ICD-10-CM

## 2024-04-29 LAB
RAD ONC MSQ ACTUAL FRACTIONS DELIVERED: 3
RAD ONC MSQ ACTUAL SESSION DELIVERED DOSE: 250 CGRAY
RAD ONC MSQ ACTUAL TOTAL DOSE: 750 CGRAY
RAD ONC MSQ ELAPSED DAYS: 4
RAD ONC MSQ LAST DATE: NORMAL
RAD ONC MSQ PRESCRIBED FRACTIONAL DOSE: 250 CGRAY
RAD ONC MSQ PRESCRIBED NUMBER OF FRACTIONS: 4
RAD ONC MSQ PRESCRIBED TECHNIQUE: NORMAL
RAD ONC MSQ PRESCRIBED TOTAL DOSE: 1000 CGRAY
RAD ONC MSQ PRESCRIPTION PATTERN COMMENT: NORMAL
RAD ONC MSQ START DATE: NORMAL
RAD ONC MSQ TREATMENT COURSE NUMBER: 1
RAD ONC MSQ TREATMENT SITE: NORMAL

## 2024-04-29 PROCEDURE — 77412 RADIATION TX DELIVERY LVL 3: CPT | Performed by: RADIOLOGY

## 2024-04-29 PROCEDURE — 77014 CHG CT GUIDANCE RADIATION THERAPY FLDS PLACEMENT: CPT | Performed by: STUDENT IN AN ORGANIZED HEALTH CARE EDUCATION/TRAINING PROGRAM

## 2024-04-29 PROCEDURE — 77336 RADIATION PHYSICS CONSULT: CPT | Performed by: RADIOLOGY

## 2024-04-29 PROCEDURE — 77387 GUIDANCE FOR RADJ TX DLVR: CPT | Performed by: STUDENT IN AN ORGANIZED HEALTH CARE EDUCATION/TRAINING PROGRAM

## 2024-04-29 PROCEDURE — 96040 PR MEDICAL GENETICS COUNSELING EACH 30 MINUTES: CPT | Performed by: GENETIC COUNSELOR, MS

## 2024-04-29 NOTE — PROGRESS NOTES
"History of Present Illness:  Heidi Quiroz \"Chelita\" is a 59 y.o. Ashkenazi Islam female with recent diagnoses of both breast cancer (DCIS) and endometrial cancer. She also has a family history of lung cancer. Ms. Quiroz was referred to the Cancer Genetics Clinic at [facility] by Samia Banda MD. She is interested in genetic testing to clarify her personal risk for cancer, as well as the risks to her family members.    Cancer Medical History:  Personal history of cancer? Yes.  Summary:    (1) Breast cancer (DCIS):  Diagnosed at age 58 with screen detected left high grade DCIS, ER negative (biopsy 1/22/24). S/p lumpectomy 2/15/24 followed by re-excision 2/28/24. Currently undergoing adjuvant radiation, with last treatment scheduled 4/30/24. Surgeon: Dr Banda. Radiation oncologist: Dr. Hernandez.    (2) Endometrial cancer:  Diagnosed at age 59 with endometrial cancer following workup for post-menopausal bleeding. Gyn ordered ultrasound which showed thickened endometrial stripe, and endometrial biopsy showed endometrial adenocarcinoma, endometrioid type with squamous and mucinous differentiation, FIGO grade 1. IHC for mismatch repair proteins showed intact staining. Scheduled for TL-BSO 5/17/2024. Note: Elevated BMI 36.61 (4/25/24). Gyn-oncologist: Dr. Eckert.    History of other cancers: No.    Prior hereditary cancer genetic testing? No.    Other health issues:  GERD, hypothyroidism, multinodular goiter, hypertension, cholelithiasis, hearing loss, high cholesterol, ulcerative colitis, former smoker (quit >15 years ago).      Cancer screening history:  Mammograms? Yes.  PAP smear? Yes.  Colonoscopy? Yes, x2 (2016 & 2022). No history of colon polyps. Diagnosed with ulcerative colitis.  Upper endoscopy? No.  Dermatology? Yes, last skin check 2024. Has had lesions removed that were benign.  Other cancer screening? Not regularly, but history of lung nodule and multinodular goiter. Released from lung follow-up. " "Continues to be followed due to thyroid (Dr. Janusz Regalado), may need thyroid biopsy.    Reproductive History:  Number of children: 1.  Number of pregnancies: 2 (SAB x1).  Age first birth: 29.  Breast feeding? Yes, for short time.  Menarche (age): 12.  Menopause (age): 49.  OCP: No.  HRT: No.    Hysterectomy? No.  Oophorectomy? No.    Family history:  A 4-generation pedigree was obtained and was significant for the following:  -Patient with ER- DCIS at age 58 and mismatch repair proficient endometrial cancer at age 59, per the above;  -Brother #1, lung cancer in his 60s, smoker,  at 63;  -Brother #2, lung cancer at 65, smoker,  at 65;  -Father, \"liver cancer,\"  at age 60;   -Father was an only child;  -Mother, lung cancer in her 70s, smoker,  at 74;   -Smaller maternal family. Mother had only one sibling (sister) who  in her late 20s/early 30s due to pregnancy complications.    Ms. Quiroz is of [place] Ashkenazi Zoroastrianism (paternal) and [facility] Ashkenazi Zoroastrianism (maternal) ancestry. Her parents are reportedly \"second cousins,\" but she is not sure of the exact relationship.    Genetic counseling:  Ms. Quiroz is a 59 y.o. Ashkenazi Zoroastrianism female with a recent diagnosis of ER- DCIS and endometrial cancer in the setting of a smaller family. Given her Ashkenazi Zoroastrianism ancestry, she has a 1 in 40 (2-3%) chance to be carrying one of the three Ashkenazi Zoroastrianism  mutations in the BRCA1 and BRCA2 genes associated with hereditary breast and ovarian cancer (HBOC). Ms. Quiroz meets current national (NCCN) criteria for testing of high-penetrance breast cancer susceptibility genes, including BRCA1, BRCA2, CDH1, PALB2, PTEN, and TP53.  Testing is medically necessary, as it will help determine if Ms. Quiroz is a candidate for high-risk breast care, as well as other cancer screenings.    We reviewed genes and chromosomes, inherited forms of breast and ovarian cancer, and the BRCA1 and BRCA2 genes causing " HBOC.  We discussed that most cancers are not due to an inherited genetic susceptibility.  However, in about 5-10% of families, there is an inherited genetic mutation that can make a person more susceptible to developing certain forms of cancer.  Within these families, we often see multiple family members with cancer, occurring in multiple generations.  In addition, earlier onset and bilateral cancers are suggestive of an inherited form of cancer.  Finally, there is a clustering of certain types of cancer in these families, such as breast and ovarian cancer.    We discussed the BRCA1 and BRCA2 genes, which are two genes that have been linked to early-onset breast and/or ovarian cancer.  Mutations in these genes are inherited in a dominant pattern and confer up to an 87% lifetime risk for breast cancer.  This is elevated compared to the general population risk of 10-12%.  In addition, BRCA1 and BRCA2 mutation carriers have up to a 45% lifetime risk for ovarian cancer, which is elevated over the 2% general population risk.  Mutation carriers who have already been diagnosed with cancer have an increased risk to develop a second, contralateral breast cancer.  BRCA2 gene mutation carriers have an increased risk for male breast cancer, prostate cancer, melanoma, gastric cancer, and pancreatic cancer.    We discussed that there are multiple genes associated with increased breast and/or ovarian cancer risk. Some genes, like the BRCA genes are considered highly penetrant breast and ovarian cancer genes, meaning a mutation in the gene confers a high risk of breast and/or ovarian cancer. On the other hand, there are other intermediate (moderate risk) breast and ovarian cancer genes. For many of the moderate risk genes, there is sometimes limited information regarding the degree to which a mutation in the gene affects risk of different types of cancers. Additionally, for some of these moderate risk genes, the appropriate  management for individuals who have a mutation in one of these genes is not always clear. In many cases, even if an individual tests positive for a mutation in a moderate risk gene, recommendations are still based on the family history, not the positive test result.    Ms. Quiroz was counseled about hereditary cancer susceptibility including cancer risks, options for increased screening and/or risk reduction, genetic testing, and the implications for other family members.  Of note, given she is of Ashkenazi Scientology ancestry, we discussed the possibility of pursuing BRCA1 & BRCA2 testing as part of a multigene panel that would also examine other genes in which Ashkenazi [employer]ology  mutations can be found, such as with Zaragoza syndrome (MLH1, MHSH2, MSH6, PMS2, & EPCAM). Even though Ms. Quiroz's endometrial cancer showed intact mismatch repair function and her elevated BMI may have contributed to this diagnosis in part, it would be of great benefit to Ms. Quiroz to know if she had Zaragoza syndrome, since this would directly impact her cancer screenings, etc.    We also briefly discussed Ms. Quiroz's family history of lung cancer. While lung cancer is often thought not to be hereditary and is associated with tobacco use, she has three first-degree relatives with lung cancer (). Lung cancer can be hereditary, and is associate with germline mutations in the EGFR gene, as well as non-EGFR genes suggestive of potential inherited lung cancer risk, including ERBB2 (HER2).    We therefore discussed testing for hereditary cancer, including high-penetrance breast cancer susceptibility genes, ideally as part of a multi-gene panel, such as the Invitae Common Hereditary Cancers panel or the Invitae Multi-Cancers panel, with the potential addition of EGFR and ERBB2 if not already part of the panel.     We discussed the Genetic Information Nondiscrimination Act (GERHARD). We discussed the protections and limitations of GERHARD. GERHARD  generally makes in illegal for health insurance companies, group health plans, and most employers (with >15 employees) to discriminate based on genetic information. It does not protect against genetic discrimination for life insurance, disability insurance, long-term care, or other insurances.    After a discussion about the risks, benefits, and limitations of genetic testing,  Ms. Quiroz  decided not to undergo any genetic testing for hereditary cancer at this time. She requested more time to consider if and when she will pursue testing, though she is leaning towards testing. She was provided with my contact details, and I agreed to follow-up with her in a few weeks if I have not heard from her.    PLAN:  1. Ms. Quiroz  decided not to undergo any genetic testing for hereditary cancer at this time. She requested more time to consider if and when she will pursue testing, though she is leaning towards testing. She was provided with my contact details, and I agreed to follow-up with her in a few weeks if I have not heard from her. I can be reached directly at [phone].    2.  We remain available to Ms. Quiroz at [phone] if any questions arise regarding information discussed at today's visit.    Ludy Serna MS, [facility] [place]  Genetic Counselor  [facility]  [phone]    Time spent with patient:  67 minutes (1:03-2:10 pm), virtual visit.

## 2024-04-30 ENCOUNTER — DOCUMENTATION (OUTPATIENT)
Dept: RADIATION ONCOLOGY | Facility: CLINIC | Age: 59
End: 2024-04-30
Payer: COMMERCIAL

## 2024-04-30 ENCOUNTER — HOSPITAL ENCOUNTER (OUTPATIENT)
Dept: RADIATION ONCOLOGY | Facility: CLINIC | Age: 59
Setting detail: RADIATION/ONCOLOGY SERIES
Discharge: HOME | End: 2024-04-30
Payer: COMMERCIAL

## 2024-04-30 ENCOUNTER — APPOINTMENT (OUTPATIENT)
Dept: PREADMISSION TESTING | Facility: HOSPITAL | Age: 59
End: 2024-04-30
Payer: COMMERCIAL

## 2024-04-30 DIAGNOSIS — D05.12 INTRADUCTAL CARCINOMA IN SITU OF LEFT BREAST: ICD-10-CM

## 2024-04-30 DIAGNOSIS — K51.00 ULCERATIVE PANCOLITIS WITHOUT COMPLICATION (MULTI): Primary | ICD-10-CM

## 2024-04-30 DIAGNOSIS — Z51.0 ENCOUNTER FOR ANTINEOPLASTIC RADIATION THERAPY: ICD-10-CM

## 2024-04-30 LAB
RAD ONC MSQ ACTUAL FRACTIONS DELIVERED: 4
RAD ONC MSQ ACTUAL SESSION DELIVERED DOSE: 250 CGRAY
RAD ONC MSQ ACTUAL TOTAL DOSE: 1000 CGRAY
RAD ONC MSQ ELAPSED DAYS: 5
RAD ONC MSQ LAST DATE: NORMAL
RAD ONC MSQ PRESCRIBED FRACTIONAL DOSE: 250 CGRAY
RAD ONC MSQ PRESCRIBED NUMBER OF FRACTIONS: 4
RAD ONC MSQ PRESCRIBED TECHNIQUE: NORMAL
RAD ONC MSQ PRESCRIBED TOTAL DOSE: 1000 CGRAY
RAD ONC MSQ PRESCRIPTION PATTERN COMMENT: NORMAL
RAD ONC MSQ START DATE: NORMAL
RAD ONC MSQ TREATMENT COURSE NUMBER: 1
RAD ONC MSQ TREATMENT SITE: NORMAL

## 2024-04-30 PROCEDURE — 77014 CHG CT GUIDANCE RADIATION THERAPY FLDS PLACEMENT: CPT | Performed by: RADIOLOGY

## 2024-04-30 PROCEDURE — 77412 RADIATION TX DELIVERY LVL 3: CPT | Performed by: RADIOLOGY

## 2024-04-30 PROCEDURE — 77387 GUIDANCE FOR RADJ TX DLVR: CPT | Performed by: RADIOLOGY

## 2024-04-30 NOTE — TELEPHONE ENCOUNTER
Patient was hoping to talk to you  C-diff order expires today. Can a new order be put in the system?  [phone]

## 2024-04-30 NOTE — PROGRESS NOTES
Radiation Oncology Treatment Summary    Patient Name:  Heidi Quiroz  MRN:  [id number]  :  1965    Referring Provider: Samia Banda MD  Primary Care Provider: Holden Hare DO    Brief History: Heidi Quiroz is a 59 y.o. female with Ductal carcinoma in situ (DCIS) of left breast, Clinical: cTis (DCIS), cM0, G3, ER-  Ductal carcinoma in situ (DCIS) of left breast, Pathologic: Stage 0 (pTis (DCIS), pN0, cM0, G3, ER-).  The patient completed radiotherapy as outlined below.    Radiation Treatment Summary:    Radiation Treatments       Active   No active radiation treatments to show.     Completed   Lt TB (Started on 2024)   Most recent fraction: 250 cGy given on 2024   Total given: 1,000 cGy / 1,000 cGy  (4 of 4 fractions)   Elapsed Days: 5   Technique: 3-Field Boost        Lt breast (Started on 4/3/2024)   Most recent fraction: 266 cGy given on 2024   Total given: 4,256 cGy / 4,256 cGy  (16 of 16 fractions)   Elapsed Days: 21   Technique: Opposed Tangential                     Please see the patient's Mosaiq chart for further details regarding the radiation plan, including beam energy.    Concurrent Chemotherapy:  Treatment Plans       No treatment plans exist          CTCAE Toxicity Overview:   Toxicity Assessment          2024    16:33 2024    16:35 2024    12:24 2024    16:43   Toxicity Assessment   Adverse Events Reviewed (WDL) No (Exceptions to WDL) No (Exceptions to WDL) No (Exceptions to WDL) No (Exceptions to WDL)   Treatment Site Breast Breast Breast Breast   Anorexia Grade 0 Grade 0 Grade 0 Grade 0   Dermatitis Radiation Grade 0       skin intact.  given aquaphor and skintegrity with instruction of use. Grade 0       advised to start using aquaphor and/or skintegrity daily Grade 1       faint erythema; skin intact.  using lotions daily. Grade 1       using lotions daily.  mild erythema; skin intact.   Fatigue Grade 0 Grade 0 Grade 0 Grade 1   Breast Pain Grade 0  Grade 0       some nipple tenderness Grade 1       2/10 left breast pain.  taking tylenol PRN Grade 1       intermittent soreness of breast.  3/10 pain.  taking tylenol   Edema Limbs    Grade 0   Lymphedema Grade 0 Grade 0 Grade 0 Grade 0     Patient Disposition: Ms. Quiroz will return to the clinic on 5/31/2024 to follow up with Padmini Sultana CNP.  Ms. Quiroz is instructed to contact the clinic with any concerns prior to the follow-up appointment.

## 2024-05-01 ENCOUNTER — TELEMEDICINE CLINICAL SUPPORT (OUTPATIENT)
Dept: PREADMISSION TESTING | Facility: HOSPITAL | Age: 59
End: 2024-05-01
Payer: COMMERCIAL

## 2024-05-01 ENCOUNTER — TELEPHONE (OUTPATIENT)
Dept: GYNECOLOGIC ONCOLOGY | Facility: HOSPITAL | Age: 59
End: 2024-05-01
Payer: COMMERCIAL

## 2024-05-01 ENCOUNTER — LAB (OUTPATIENT)
Dept: LAB | Facility: LAB | Age: 59
End: 2024-05-01
Payer: COMMERCIAL

## 2024-05-01 DIAGNOSIS — K51.00 ULCERATIVE PANCOLITIS WITHOUT COMPLICATION (MULTI): ICD-10-CM

## 2024-05-01 PROCEDURE — 87493 C DIFF AMPLIFIED PROBE: CPT

## 2024-05-01 PROCEDURE — 83993 ASSAY FOR CALPROTECTIN FECAL: CPT

## 2024-05-01 ASSESSMENT — DUKE ACTIVITY SCORE INDEX (DASI)
DASI METS SCORE: 8
CAN YOU HAVE SEXUAL RELATIONS: YES
CAN YOU DO YARD WORK LIKE RAKING LEAVES, WEEDING OR PUSHING A MOWER: YES
CAN YOU PARTICIPATE IN STRENOUS SPORTS LIKE SWIMMING, SINGLES TENNIS, FOOTBALL, BASKETBALL, OR SKIING: NO
CAN YOU RUN A SHORT DISTANCE: NO
CAN YOU DO LIGHT WORK AROUND THE HOUSE LIKE DUSTING OR WASHING DISHES: YES
CAN YOU DO MODERATE WORK AROUND THE HOUSE LIKE VACUUMING, SWEEPING FLOORS OR CARRYING GROCERIES: YES
TOTAL_SCORE: 42.7
CAN YOU WALK INDOORS, SUCH AS AROUND YOUR HOUSE: YES
CAN YOU PARTICIPATE IN MODERATE RECREATIONAL ACTIVITIES LIKE GOLF, BOWLING, DANCING, DOUBLES TENNIS OR THROWING A BASEBALL OR FOOTBALL: YES
CAN YOU TAKE CARE OF YOURSELF (EAT, DRESS, BATHE, OR USE TOILET): YES
CAN YOU WALK A BLOCK OR TWO ON LEVEL GROUND: YES
CAN YOU CLIMB A FLIGHT OF STAIRS OR WALK UP A HILL: YES
CAN YOU DO HEAVY WORK AROUND THE HOUSE LIKE SCRUBBING FLOORS OR LIFTING AND MOVING HEAVY FURNITURE: YES

## 2024-05-01 ASSESSMENT — ENCOUNTER SYMPTOMS
DIARRHEA: 1
NEUROLOGICAL NEGATIVE: 1
CONSTITUTIONAL NEGATIVE: 1
CARDIOVASCULAR NEGATIVE: 1
RESPIRATORY NEGATIVE: 1

## 2024-05-01 NOTE — CPM/PAT NURSE NOTE
"CPM/PAT Nurse Note      Name: Heidi Quiroz (Heidi Quiroz \"Chelita\")  /Age: 1965/59 y.o. is being seen in [facility] on 2024 for Total laparoscopic hysterectomy, bilateral salpingo-oophorectomy - Bilateral with Anahy Eckert MD on 2024.    PCP: Holden Hare, , LOV 2024    -EKG 2024   Sinus bradycardia  Otherwise normal EKG    GYN ONC: Anahy Eckert, , LOV 2024 for endometrial cancer    BREAST: Samia Veronika, , LOV 3/6/2024 for breast cancer    RAD ONC: Symone Hernandez, , LOV 2024 for breast cancer    OBGYN: Tamika Castellano, , LOV 3/18/2024 for postmenopausal bleeding    SRG ONC: Janusz Regalado, , LOV 2023 for multinodular thyroid    ENT: Anahy Schmidt, , LOV 2023 for b/l hearing loss    GI: Zakiya Morrison, , LOV 2022 for ulcerative colitis      Past Medical History:   Diagnosis Date    Anemia     Arthritis     Cholelithiasis     Chronic pain disorder     Ductal carcinoma in situ (DCIS) of left breast     Essential (primary) hypertension     GERD (gastroesophageal reflux disease)     diet controlled    Hearing loss     mild    Insomnia     Irritable bowel syndrome     Lung nodule     Nontoxic multinodular goiter     PMB (postmenopausal bleeding)     Pure hypercholesterolemia, unspecified     Ulcerative colitis (Multi)     under control with medication    Vitamin D deficiency, unspecified        Past Surgical History:   Procedure Laterality Date    BREAST LUMPECTOMY Left 02/15/2024    COLONOSCOPY      DILATION AND CURETTAGE OF UTERUS      ENDOMETRIAL BIOPSY      TOTAL HIP ARTHROPLASTY Left 2017    WISDOM TOOTH EXTRACTION         Patient Sexual activity questions deferred to the physician.    Family History   Problem Relation Name Age of Onset    Heart attack Mother      Lung cancer Mother      Hypertension Father      Liver cancer Father      Hypertension Brother      Lung cancer Brother      Other (psoriatic arthritis) Son         No Known " Allergies    Prior to Admission medications    Medication Sig Start Date End Date Taking? Authorizing Provider   acetaminophen (Tylenol) 500 mg tablet Take 1 tablet (500 mg) by mouth every 6 hours if needed for mild pain (1 - 3).   Yes Historical Provider, MD   cholecalciferol (Vitamin D-3) 50 mcg (2,000 unit) capsule Take by mouth. 11/22/13  Yes Historical Provider, MD   mesalamine (Lialda) 1.2 gram EC tablet TAKE 4 TABLETS BY MOUTH ONCE DAILY WITH THE EVENING MEAL.  Patient taking differently: Take 4 tablets (4.8 g) by mouth once daily in the evening. Take with meals. Takes 2 in AM and 2 in PM 2/26/24  Yes WERNER Jade   propranolol LA (Inderal LA) 80 mg 24 hr capsule TAKE 1 CAPSULE (80 MG) BY MOUTH ONCE DAILY. DO NOT CRUSH, CHEW, OR SPLIT. 4/17/24  Yes Holden Hare,    rosuvastatin (Crestor) 20 mg tablet Take 1 tablet (20 mg) by mouth once daily. 2/16/24  Yes Holden Hare,    amoxicillin (Amoxil) 500 mg tablet Take 4 tablets (2,000 mg) by mouth every 12 hours. 4/4/22   Historical Provider, MD   mesalamine (Lialda) 1.2 gram EC tablet Take 4 tablets (4.8 g) by mouth once daily in the evening. Take with meals.  Patient not taking: Reported on 5/1/2024 2/26/24 2/25/25  WERNER Jade   miSOPROStoL (Cytotec) 200 mcg tablet Take 1 tablet (200 mcg) by mouth 1 time for 1 dose. Take 1 tablet by mouth the night before the biopsy.  Patient not taking: Reported on 5/1/2024 2/26/24 2/26/24  Tamika Castellano MD        PAT ROS:   Constitutional:   neg    Neuro/Psych:   neg    Eyes:   Ears:   Nose:   neg    Mouth:   Throat:   neg    Neck:   Cardio:   neg    Respiratory:   neg    Endocrine:   GI:    diarrhea  :   neg    Musculoskeletal:   Hematologic:   neg    Skin:      DASI Risk Score      Flowsheet Row Most Recent Value   DASI SCORE 42.7   METS Score (Will be calculated only when all the questions are answered) 8          Caprini DVT Assessment    No data to display       Modified Frailty  Index    No data to display       CHADS2 Stroke Risk  Current as of 6 hours ago        N/A 3 to 100%: High Risk   2 to < 3%: Medium Risk   0 to < 2%: Low Risk     Last Change: N/A          This score determines the patient's risk of having a stroke if the patient has atrial fibrillation.        This score is not applicable to this patient. Components are not calculated.          Revised Cardiac Risk Index    No data to display       Apfel Simplified Score    No data to display       Risk Analysis Index Results This Encounter    No data found in the last 1 encounters.         Nurse Plan of Action:     Medication Instructions: Please stop all vitamins, supplements, and any NSAIDs (Alevel, Ibuprofen, Motrin, etc) 7 days prior to surgery.

## 2024-05-01 NOTE — TELEPHONE ENCOUNTER
Patient's leave of absence and return to work paperwork was completed and signed by physician. Paperwork then sent to [facility].

## 2024-05-02 LAB — C DIF TOX TCDA+TCDB STL QL NAA+PROBE: NOT DETECTED

## 2024-05-05 LAB — CALPROTECTIN STL-MCNT: 33 UG/G

## 2024-05-07 ENCOUNTER — PRE-ADMISSION TESTING (OUTPATIENT)
Dept: PREADMISSION TESTING | Facility: HOSPITAL | Age: 59
End: 2024-05-07
Payer: COMMERCIAL

## 2024-05-07 VITALS
TEMPERATURE: 97.8 F | BODY MASS INDEX: 36.95 KG/M2 | HEART RATE: 65 BPM | SYSTOLIC BLOOD PRESSURE: 143 MMHG | OXYGEN SATURATION: 98 % | WEIGHT: 208.56 LBS | HEIGHT: 63 IN | DIASTOLIC BLOOD PRESSURE: 83 MMHG

## 2024-05-07 DIAGNOSIS — K51.00 ULCERATIVE PANCOLITIS WITHOUT COMPLICATION (MULTI): ICD-10-CM

## 2024-05-07 DIAGNOSIS — R94.31 ABNORMAL EKG: ICD-10-CM

## 2024-05-07 DIAGNOSIS — C54.1 ENDOMETRIAL CANCER DETERMINED BY UTERINE BIOPSY (MULTI): ICD-10-CM

## 2024-05-07 DIAGNOSIS — E78.5 DYSLIPIDEMIA: ICD-10-CM

## 2024-05-07 LAB
ABO GROUP (TYPE) IN BLOOD: NORMAL
ALBUMIN SERPL BCP-MCNC: 4.1 G/DL (ref 3.4–5)
ALP SERPL-CCNC: 94 U/L (ref 33–110)
ALT SERPL W P-5'-P-CCNC: 20 U/L (ref 7–45)
ANION GAP SERPL CALC-SCNC: 13 MMOL/L (ref 10–20)
ANTIBODY SCREEN: NORMAL
AST SERPL W P-5'-P-CCNC: 17 U/L (ref 9–39)
BILIRUB SERPL-MCNC: 0.5 MG/DL (ref 0–1.2)
BUN SERPL-MCNC: 16 MG/DL (ref 6–23)
CALCIUM SERPL-MCNC: 9.1 MG/DL (ref 8.6–10.6)
CHLORIDE SERPL-SCNC: 102 MMOL/L (ref 98–107)
CHOLEST SERPL-MCNC: 176 MG/DL (ref 0–199)
CHOLESTEROL/HDL RATIO: 3.7
CO2 SERPL-SCNC: 28 MMOL/L (ref 21–32)
CREAT SERPL-MCNC: 0.75 MG/DL (ref 0.5–1.05)
CRP SERPL-MCNC: 1.05 MG/DL
EGFRCR SERPLBLD CKD-EPI 2021: >90 ML/MIN/1.73M*2
ERYTHROCYTE [DISTWIDTH] IN BLOOD BY AUTOMATED COUNT: 13.2 % (ref 11.5–14.5)
ERYTHROCYTE [SEDIMENTATION RATE] IN BLOOD BY WESTERGREN METHOD: 40 MM/H (ref 0–30)
GLUCOSE SERPL-MCNC: 82 MG/DL (ref 74–99)
HCT VFR BLD AUTO: 45.8 % (ref 36–46)
HDLC SERPL-MCNC: 47.1 MG/DL
HGB BLD-MCNC: 14.3 G/DL (ref 12–16)
IRON SATN MFR SERPL: 19 % (ref 25–45)
IRON SERPL-MCNC: 59 UG/DL (ref 35–150)
LDLC SERPL CALC-MCNC: 84 MG/DL
MCH RBC QN AUTO: 26.4 PG (ref 26–34)
MCHC RBC AUTO-ENTMCNC: 31.2 G/DL (ref 32–36)
MCV RBC AUTO: 85 FL (ref 80–100)
NON HDL CHOLESTEROL: 129 MG/DL (ref 0–149)
NRBC BLD-RTO: 0 /100 WBCS (ref 0–0)
PLATELET # BLD AUTO: 193 X10*3/UL (ref 150–450)
POTASSIUM SERPL-SCNC: 4.2 MMOL/L (ref 3.5–5.3)
PROT SERPL-MCNC: 7 G/DL (ref 6.4–8.2)
RBC # BLD AUTO: 5.42 X10*6/UL (ref 4–5.2)
RH FACTOR (ANTIGEN D): NORMAL
SODIUM SERPL-SCNC: 139 MMOL/L (ref 136–145)
TIBC SERPL-MCNC: 318 UG/DL (ref 240–445)
TRIGL SERPL-MCNC: 225 MG/DL (ref 0–149)
UIBC SERPL-MCNC: 259 UG/DL (ref 110–370)
VIT B12 SERPL-MCNC: 379 PG/ML (ref 211–911)
VLDL: 45 MG/DL (ref 0–40)
WBC # BLD AUTO: 6.6 X10*3/UL (ref 4.4–11.3)

## 2024-05-07 PROCEDURE — 83718 ASSAY OF LIPOPROTEIN: CPT

## 2024-05-07 PROCEDURE — 86901 BLOOD TYPING SEROLOGIC RH(D): CPT

## 2024-05-07 PROCEDURE — 85027 COMPLETE CBC AUTOMATED: CPT

## 2024-05-07 PROCEDURE — 36415 COLL VENOUS BLD VENIPUNCTURE: CPT

## 2024-05-07 PROCEDURE — 82607 VITAMIN B-12: CPT

## 2024-05-07 PROCEDURE — 99204 OFFICE O/P NEW MOD 45 MIN: CPT | Performed by: NURSE PRACTITIONER

## 2024-05-07 PROCEDURE — 85652 RBC SED RATE AUTOMATED: CPT

## 2024-05-07 PROCEDURE — 83540 ASSAY OF IRON: CPT

## 2024-05-07 PROCEDURE — 86140 C-REACTIVE PROTEIN: CPT

## 2024-05-07 PROCEDURE — 80053 COMPREHEN METABOLIC PANEL: CPT

## 2024-05-07 ASSESSMENT — DUKE ACTIVITY SCORE INDEX (DASI)
CAN YOU PARTICIPATE IN STRENOUS SPORTS LIKE SWIMMING, SINGLES TENNIS, FOOTBALL, BASKETBALL, OR SKIING: NO
CAN YOU TAKE CARE OF YOURSELF (EAT, DRESS, BATHE, OR USE TOILET): YES
CAN YOU WALK INDOORS, SUCH AS AROUND YOUR HOUSE: YES
TOTAL_SCORE: 50.7
DASI METS SCORE: 9
CAN YOU CLIMB A FLIGHT OF STAIRS OR WALK UP A HILL: YES
CAN YOU DO MODERATE WORK AROUND THE HOUSE LIKE VACUUMING, SWEEPING FLOORS OR CARRYING GROCERIES: YES
CAN YOU HAVE SEXUAL RELATIONS: YES
CAN YOU DO YARD WORK LIKE RAKING LEAVES, WEEDING OR PUSHING A MOWER: YES
CAN YOU DO HEAVY WORK AROUND THE HOUSE LIKE SCRUBBING FLOORS OR LIFTING AND MOVING HEAVY FURNITURE: YES
CAN YOU WALK A BLOCK OR TWO ON LEVEL GROUND: YES
CAN YOU DO LIGHT WORK AROUND THE HOUSE LIKE DUSTING OR WASHING DISHES: YES
CAN YOU PARTICIPATE IN MODERATE RECREATIONAL ACTIVITIES LIKE GOLF, BOWLING, DANCING, DOUBLES TENNIS OR THROWING A BASEBALL OR FOOTBALL: YES
CAN YOU RUN A SHORT DISTANCE: YES

## 2024-05-07 ASSESSMENT — ENCOUNTER SYMPTOMS
CONSTITUTIONAL NEGATIVE: 1
GASTROINTESTINAL NEGATIVE: 1
CARDIOVASCULAR NEGATIVE: 1
ARTHRALGIAS: 1
ENDOCRINE NEGATIVE: 1
ROS GI COMMENTS: LOOSE STOOL
NECK NEGATIVE: 1
EYES NEGATIVE: 1
RESPIRATORY NEGATIVE: 1
SKIN CHANGES: 1
NEUROLOGICAL NEGATIVE: 1

## 2024-05-07 ASSESSMENT — LIFESTYLE VARIABLES: SMOKING_STATUS: NONSMOKER

## 2024-05-07 NOTE — CPM/PAT H&P
"CPM/PAT Evaluation       Name: Heidi Quiroz (Heidi Quiroz \"Chelita\")  /Age: 1965/59 y.o.     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 Now with recently diagnosed endometrial cancer. She presents today for perioperative evaluation in anticipation of Total laparoscopic hysterectomy, bilateral salpingo-oophorectomy - Bilateral  [place] lymph node mapping and biopsy, any other indicated procedures - Bilateral on 24 with Dr. Eckert.     Past Medical History:   Diagnosis Date    Anemia     Arthritis     Cholelithiasis     Chronic pain disorder     Ductal carcinoma in situ (DCIS) of left breast     Essential (primary) hypertension     GERD (gastroesophageal reflux disease)     diet controlled    Hearing loss     mild    Insomnia     Irritable bowel syndrome     Lung nodule     Nontoxic multinodular goiter     PMB (postmenopausal bleeding)     Pure hypercholesterolemia, unspecified     Ulcerative colitis (Multi)     under control with medication    Vitamin D deficiency, unspecified        Past Surgical History:   Procedure Laterality Date    BREAST LUMPECTOMY Left 02/15/2024    COLONOSCOPY      DILATION AND CURETTAGE OF UTERUS      ENDOMETRIAL BIOPSY      TOTAL HIP ARTHROPLASTY Left 2017    WISDOM TOOTH EXTRACTION         Patient Sexual activity questions deferred to the physician.    Family History   Problem Relation Name Age of Onset    Heart attack Mother      Lung cancer Mother      Hypertension Father      Liver cancer Father      Hypertension Brother      Lung cancer Brother      Other (psoriatic arthritis) Son         No Known Allergies    Prior to Admission medications    Medication Sig Start Date End Date Taking? Authorizing Provider   acetaminophen (Tylenol) 500 mg tablet Take 1 tablet (500 mg) by mouth every 6 hours if needed " for mild pain (1 - 3).    Historical Provider, MD   amoxicillin (Amoxil) 500 mg tablet Take 4 tablets (2,000 mg) by mouth every 12 hours. 4/4/22   Historical Provider, MD   cholecalciferol (Vitamin D-3) 50 mcg (2,000 unit) capsule Take by mouth. 11/22/13   Historical Provider, MD   mesalamine (Lialda) 1.2 gram EC tablet TAKE 4 TABLETS BY MOUTH ONCE DAILY WITH THE EVENING MEAL.  Patient taking differently: Take 4 tablets (4.8 g) by mouth once daily in the evening. Take with meals. Takes 2 in AM and 2 in PM 2/26/24   WERNER Jade   mesalamine (Lialda) 1.2 gram EC tablet Take 4 tablets (4.8 g) by mouth once daily in the evening. Take with meals.  Patient not taking: Reported on 5/1/2024 2/26/24 2/25/25  WERNER Jade   miSOPROStoL (Cytotec) 200 mcg tablet Take 1 tablet (200 mcg) by mouth 1 time for 1 dose. Take 1 tablet by mouth the night before the biopsy.  Patient not taking: Reported on 5/1/2024 2/26/24 2/26/24  Tamika Castellano MD   propranolol LA (Inderal LA) 80 mg 24 hr capsule TAKE 1 CAPSULE (80 MG) BY MOUTH ONCE DAILY. DO NOT CRUSH, CHEW, OR SPLIT. 4/17/24   Holden Hare DO   rosuvastatin (Crestor) 20 mg tablet Take 1 tablet (20 mg) by mouth once daily. 2/16/24   Holden Hare DO        PAT ROS:   Constitutional:    fatigue  neg    Neuro/Psych:   neg    Eyes:   neg     use of corrective lenses  Ears:   neg    Nose:   neg    Mouth:   neg    Throat:   neg    Neck:   neg    Cardio:   neg    Respiratory:   neg    Endocrine:   neg    GI:    Loose stool  neg    :   neg    Musculoskeletal:    arthralgias  Hematologic:   neg    Skin:  neg     skin changes (radiation rash under breast)      Physical Exam  Vitals reviewed.   Constitutional:       Appearance: Normal appearance. She is obese.   HENT:      Head: Normocephalic and atraumatic.      Nose: Nose normal.      Mouth/Throat:      Mouth: Mucous membranes are moist.      Pharynx: Oropharynx is clear.   Eyes:      Extraocular  Movements: Extraocular movements intact.      Conjunctiva/sclera: Conjunctivae normal.      Pupils: Pupils are equal, round, and reactive to light.   Cardiovascular:      Rate and Rhythm: Normal rate and regular rhythm.      Pulses: Normal pulses.      Heart sounds: Normal heart sounds.   Pulmonary:      Effort: Pulmonary effort is normal.      Breath sounds: Normal breath sounds.   Musculoskeletal:         General: Normal range of motion.      Cervical back: Normal range of motion.   Skin:     General: Skin is warm and dry.   Neurological:      General: No focal deficit present.      Mental Status: She is alert and oriented to person, place, and time.   Psychiatric:         Mood and Affect: Mood normal.         Behavior: Behavior normal.          PAT AIRWAY:   Airway:     Mallampati::  III    TM distance::  >3 FB    Neck ROM::  Full  normal        Visit Vitals  /83   Pulse 65   Temp 36.6 °C (97.8 °F) (Temporal)       DASI Risk Score      Flowsheet Row Most Recent Value   DASI SCORE 50.7   METS Score (Will be calculated only when all the questions are answered) 9          Caprini DVT Assessment      Flowsheet Row Most Recent Value   DVT Score 12   Current Status Major surgery planned, lasting 2-3 hours, Present cancer or chemotherapy   History Prior major surgery, Previous malignancy   Age 40-59 years   BMI 31-40 (Obesity)          Modified Frailty Index      Flowsheet Row Most Recent Value   Modified Frailty Index Calculator .0909          CHADS2 Stroke Risk  Current as of today        N/A 3 to 100%: High Risk   2 to < 3%: Medium Risk   0 to < 2%: Low Risk     Last Change: N/A          This score determines the patient's risk of having a stroke if the patient has atrial fibrillation.        This score is not applicable to this patient. Components are not calculated.          Revised Cardiac Risk Index      Flowsheet Row Most Recent Value   Revised Cardiac Risk Calculator 0          Apfel Simplified Score       Flowsheet Row Most Recent Value   Apfel Simplified Score Calculator 3          Risk Analysis Index Results This Encounter    No data found in the last 1 encounters.       Stop Bang Score      Flowsheet Row Most Recent Value   Do you snore loudly? 1   Do you often feel tired or fatigued after your sleep? 1   Has anyone ever observed you stop breathing in your sleep? 0   Do you have or are you being treated for high blood pressure? 1   Recent BMI (Calculated) 36.6   Is BMI greater than 35 kg/m2? 1=Yes   Age older than 50 years old? 1=Yes   Is your neck circumference greater than 17 inches (Male) or 16 inches (Female)? 0   Gender - Male 0=No   STOP-BANG Total Score 5          Results for orders placed or performed in visit on 05/07/24 (from the past 96 hour(s))   Lipid Panel   Result Value Ref Range    Cholesterol 176 0 - 199 mg/dL    HDL-Cholesterol 47.1 mg/dL    Cholesterol/HDL Ratio 3.7     LDL Calculated 84 <=99 mg/dL    VLDL 45 (H) 0 - 40 mg/dL    Triglycerides 225 (H) 0 - 149 mg/dL    Non HDL Cholesterol 129 0 - 149 mg/dL   Vitamin B12   Result Value Ref Range    Vitamin B12 379 211 - 911 pg/mL   Iron and TIBC   Result Value Ref Range    Iron 59 35 - 150 ug/dL    UIBC 259 110 - 370 ug/dL    TIBC 318 240 - 445 ug/dL    % Saturation 19 (L) 25 - 45 %   CBC   Result Value Ref Range    WBC 6.6 4.4 - 11.3 x10*3/uL    nRBC 0.0 0.0 - 0.0 /100 WBCs    RBC 5.42 (H) 4.00 - 5.20 x10*6/uL    Hemoglobin 14.3 12.0 - 16.0 g/dL    Hematocrit 45.8 36.0 - 46.0 %    MCV 85 80 - 100 fL    MCH 26.4 26.0 - 34.0 pg    MCHC 31.2 (L) 32.0 - 36.0 g/dL    RDW 13.2 11.5 - 14.5 %    Platelets 193 150 - 450 x10*3/uL   C-reactive protein   Result Value Ref Range    C-Reactive Protein 1.05 (H) <1.00 mg/dL   Sedimentation Rate   Result Value Ref Range    Sedimentation Rate 40 (H) 0 - 30 mm/h   Comprehensive metabolic panel   Result Value Ref Range    Glucose 82 74 - 99 mg/dL    Sodium 139 136 - 145 mmol/L    Potassium 4.2 3.5 - 5.3 mmol/L     Chloride 102 98 - 107 mmol/L    Bicarbonate 28 21 - 32 mmol/L    Anion Gap 13 10 - 20 mmol/L    Urea Nitrogen 16 6 - 23 mg/dL    Creatinine 0.75 0.50 - 1.05 mg/dL    eGFR >90 >60 mL/min/1.73m*2    Calcium 9.1 8.6 - 10.6 mg/dL    Albumin 4.1 3.4 - 5.0 g/dL    Alkaline Phosphatase 94 33 - 110 U/L    Total Protein 7.0 6.4 - 8.2 g/dL    AST 17 9 - 39 U/L    Bilirubin, Total 0.5 0.0 - 1.2 mg/dL    ALT 20 7 - 45 U/L   Type And Screen   Result Value Ref Range    ABO TYPE A     Rh TYPE POS     ANTIBODY SCREEN NEG         Diagnostic Results          EKG 2/12/24  Normal sinus rhythm  Low voltage QRS  Junctional ST depression, probably abnormal  Abnormal ECG  When compared with ECG of 19-JUL-2017 13:15,  No significant change was found    Carotid Artery Duplex Ultrasound 4/18/2016  CONCLUSIONS:  Right Lower Venous: No evidence of acute deep vein thrombus visualized in the right lower extremity.  Left Lower Venous: No evidence of acute deep vein thrombus visualized in the left lower extremity.    Exercise Stress Echo 3/22/2016  Summary:   1. The resting ejection fraction was estimated at 55% with a peak exercise ejection fraction estimated at 65%.   2. No clinical, electrocardiographic or echocardiographic evidence for ischemia at a maximal workload.      Assessment and Plan:     Anesthesia:  The patient denies problems with anesthesia in the past such as PONV, prolonged sedation, awareness, dental damage, aspiration, cardiac arrest, difficult intubation, or unexpected hospital admissions.     Neuro:   The patient has no neurological diagnoses or significant findings on chart review, clinical presentation, and evaluation. No grossly apparent neurological perioperative risk. Handouts for preoperative brain exercises given to patient.    HEENT/Airway  The patient has diagnoses, significant findings on chart review, clinical presentation or evaluation of obesity, short thick neck. No documented or reported history of airway  difficulty.     Cardiovascular  The patient is scheduled for non-cardiac surgery associated with elevated risk. The patient has no major cardiac contraindications to non- cardiac surgery.  RCRI  The patient meets 0-1 RCRI criteria and therefore has a less than 1% risk of major adverse cardiac complications.  METS  The patient's functional capacity capacity is greater than 4 METS.  EKG  The patient has no EKG or echocardiographic changes concerning for myocardial ischemia.   Heart Failure  The patient has no known history of heart failure.  Additionally, the patient reports no symptoms of heart failure and demonstrates no signs of heart failure.  Hypertension Evaluation  The patient has a known history of hypertension that is controlled.  Patient's hypertension is most consistent with stage 2.  Heart Rhythm Evaluation  The patient has no history of arrhythmias.  Heart Valve Evaluation  The patient has no known history of valvular heart disease. The patient has no symptoms or physical exam findings to suggest valvular heart disease.  Cardiology Evaluation  The patient is not followed by cardiology.    The patient has a 30-day risk for MACE of 0 predictors, 3.9% risk for cardiac death, nonfatal myocardial infarction, and nonfatal cardiac arrest.  FELICITAS score which indicates a 0.1% risk of intraoperative or 30-day postoperative.    Pulmonary   No significant findings on chart review or clinical presentation and evaluation. The patient is at increased risk of perioperative pulmonary complications secondary to morbid obesity.    The patient has a stop bang score of 5, which places patient at high risk for having LAUREN.    ARISCAT 34, Intermediate, 13.3% risk of in-hospital postoperative pulmonary complications  PRODIGY 8, high risk of respiratory depression episode. Patient given PI sheet for preoperative deep breathing exercises.    Hematology  No diagnoses or significant findings on chart review or clinical presentation  and evaluation.  Antiplatelet management   The patient is not currently receiving antiplatelet therapy.  Anticoagulation management  The patient is not currently receiving anticoagulation therapy. Patient provided with DVT educational handout.      Caprini score 12, high risk of perioperative VTE.     Patient instructed to ambulate as soon as possible postoperatively to decrease thromboembolic risk. Initiate mechanical DVT prophylaxis as soon as possible and initiate chemical prophylaxis when deemed safe from a bleeding standpoint post surgery.     Transfusion Evaluation  A type and screen was obtained given the likelihood for perioperative transfusion of blood or blood products.    Gastrointestinal  The patient has history of Ulcerative colitis managed on Mesalamine, follows with Zakiya Morrison CNP in gastroenterology. Last seen 6/13/2023.     Eat 10- 0,  self-perceived oropharyngeal dysphagia scale (0-40)     Genitourinary  The patient has newly diagnosed endometrial cancer scheduled for surgery with Dr. Eckert 5/17/24.    Renal  The patient has no known history of chronic kidney disease. No renal diagnoses or significant findings on chart review or clinical presentation and evaluation. The patient has specific risk factors associated with increased risk of perioperative renal complications due to age greater than 55, hypertension. Preventative measures include preoperative hydration.    Musculoskeletal  The patient has history of OA s/p left total hip replacement. No current complaints. Avoid NSAIDS 7 days prior to surgery.    Endocrine  No diagnoses or significant findings on chart review or clinical presentation and evaluation.    ID  No diagnoses or significant findings on chart review or clinical presentation and evaluation.    -Preoperative medication instructions were provided and reviewed with the patient. Any additional testing or evaluation was explained to the patient.  NPO Instructions were discussed,  and the patient's questions were answered prior to conclusion of this encounter.

## 2024-05-07 NOTE — PREPROCEDURE INSTRUCTIONS
Fasting Guidelines    NPO Instructions:    Do not eat any food after midnight the night before your surgery/procedure.  You may have up to 13.5 ounces of clear liquids until TWO hours before your instructed arrival time to the hospital. This includes water, black tea/coffee, (no milk or cream), apple juice, and/or electrolyte drinks (Gatorade).  You may chew gum up to TWO hours before your surgery/procedure.    Additional Instructions:    Avoid herbal supplements, multivitamins and NSAIDS (non-steroidal anti-inflammatory drugs) such as Advil, Aleve, Ibuprofen, Naproxen, Excedrin, Meloxicam or Celebrex for at least 7 days prior to surgery. May take Tylenol as needed.    Avoid tobacco and alcohol products for 24 hours prior to surgery.    CONTACT SURGEON'S OFFICE IF YOU DEVELOP:  * Fever = 100.4 F   * New respiratory symptoms (e.g. cough, shortness of breath, respiratory distress, sore throat)  * Recent loss of taste or smell  *Flu like symptoms such as headache, fatigue or gastrointestinal symptoms  * You develop any open sores, shingles, burning or painful urination   AND/OR:  * You no longer wish to have the surgery.  * Any other personal circumstances change that may lead to the need to cancel or defer this surgery.  *You were admitted to any hospital within one week of your planned procedure.    Seven/Six Days before Surgery:  Review your medication instructions, stop indicated medications    Day of Surgery:  Review your medication instructions, take indicated medications  Wear comfortable loose fitting clothing  Do not use moisturizers, creams, lotions or perfume  All jewelry and valuables should be left at home    Miguel Ángel Cardenas CNP  [facility]  [phone]  [phone]  [email or web address]    [facility]  [phone]       Preoperative Brain Exercises    What are brain exercises?  A brain exercise is any activity that  engages your thinking (cognitive) skills.    What types of activities are considered brain exercises?  Jigsaw puzzles, crossword puzzles, word jumble, memory games, word search, and many more.  Many can be found free online or on your phone via a mobile ana.    Why should I do brain exercises before my surgery?  More recent research has shown brain exercise before surgery can lower the risk of postoperative delirium (confusion) which can be especially important for older adults.  Patients who did brain exercises for 5 to 10 hours the days before surgery, cut their risk of postoperative delirium in half up to 1 week after surgery.         [facility]    Preoperative Deep Breathing Exercises    Why it is important to do deep breathing exercises before my surgery?  Deep breathing exercises strengthen your breathing muscles.  This helps you to recover after your surgery and decreases the chance of breathing complications.    How are the deep breathing exercises done?  Sit straight with your back supported.  Breathe in deeply and slowly through your nose. Your lower rib cage should expand and your abdomen may move forward.  Hold that breath for 3 to 5 seconds.  Breathe out through pursed lips, slowly and completely.  Rest and repeat 10 times every hour while awake.  Rest longer if you become dizzy or lightheaded.         Patient and Family Education             Ways You Can Help Prevent Blood Clots             This handout explains some simple things you can do to help prevent blood clots.      Blood clots are blockages that can form in the body's veins. When a blood clot forms in your deep veins, it may be called a deep vein thrombosis, or DVT for short. Blood clots can happen in any part of the body where blood flows, but they are most common in the arms and legs. If a piece of a blood clot breaks free and travels to the lungs, it is called a pulmonary embolus (PE). A PE can be a very serious  problem.         Being in the hospital or having surgery can raise your chances of getting a blood clot because you may not be well enough to move around as much as you normally do.         Ways you can help prevent blood clots in the hospital         Wearing SCDs. SCDs stands for Sequential Compression Devices.   SCDs are special sleeves that wrap around your legs  They attach to a pump that fills them with air to gently squeeze your legs every few minutes.   This helps return the blood in your legs to your heart.   SCDs should only be taken off when walking or bathing.   SCDs may not be comfortable, but they can help save your life.               Wearing compression stockings - if your doctor orders them. These special snug fitting stockings gently squeeze your legs to help blood flow.       Walking. Walking helps move the blood in your legs.   If your doctor says it is ok, try walking the halls at least   5 times a day. Ask us to help you get up, so you don't fall.      Taking any blood thinning medicines your doctor orders.        Page 1 of 2     [facility]; 3/23   Ways you can help prevent blood clots at home       Wearing compression stockings - if your doctor orders them. ? Walking - to help move the blood in your legs.       Taking any blood thinning medicines your doctor orders.      Signs of a blood clot or PE      Tell your doctor or nurse know right away if you have of the problems listed below.    If you are at home, seek medical care right away. Call 911 for chest pain or problems breathing.               Signs of a blood clot (DVT) - such as pain,  swelling, redness or warmth in your arm or leg      Signs of a pulmonary embolism (PE) - such as chest     pain or feeling short of breath

## 2024-05-10 ENCOUNTER — TELEPHONE (OUTPATIENT)
Dept: GENETICS | Facility: CLINIC | Age: 59
End: 2024-05-10
Payer: COMMERCIAL

## 2024-05-10 NOTE — TELEPHONE ENCOUNTER
Patient called and left message yesterday with questions about testing, and I returned her call.She was only able to speak briefly due to work. I answered some of her questions. We agreed to try and speak again on Monday.    Ludy Srena MS, [facility] [place]  Genetic Counselor  [facility]  [phone]

## 2024-05-10 NOTE — HOSPITAL COURSE
[X] POV w/Dr. Eckert 24 @ 4:20 pm  Minofff    58 yo female with PMHx most significant for recent L breast DCIS s/p lumpectomy & re-excision and ulcerative colitis who is presenting today for TLH, BSO, SLND.     Labs (): hgb 14.3, plt 193, Cr 0.75    OncHx:   - 3/4 TVUS for PMB that showed thickened endometrium (1.9cm)  - 3/18 EMBx with FIGO grade 1 endometrioid adenocarcinoma with squamous and mucinous differentiation.     3/4 Pelvic US, TVUS  IMPRESSION:  1. Abnormally thickened endometrial stripe. Endometrial hyperplasia,  neoplasm or polyp should be excluded.  2. Nonvisualization of the ovaries. No adnexal masses.    PMH:  - ulcerative colitis (on mesalamine,  CRP 1.05, ESR 40)  - GERD  - hypothyroidism iso multinodular goiter  - HTN  - HLD  - DCIS of L breast    PSH:  - L total hip arthroplasty   - L breast lumpectomy   - D&C, EMB     OBHx:  The patient is  . She underwent menopause at age 49. She used COCs for 0 years (states she took progesterone in her teens for 1-2 months). She did not use HRT. She underwent menarche at age 12.    Social:  She denies recreational drug use and endorses occasional alcohol use. History of tobacco use with cigarettes 0.5 ppd for 8 years. The patient lives at home alone and works from home.     FamHx:  - Mother: lung cancer at 72  - Father: liver cancer at 62  - Brother: lung cancer at 63  - Brother: lung cancer at 66  Family history is otherwise negative for a history of breast, ovarian, uterine, colon, pancreatic, and GI cancer.     Screening:  Cervical cancer: 10/3/2022: negative, HPV high risk negative   Mammogram:  24: post procedure mammogram   Colonoscopy: 2022: ulcerative colitis, follow up in 5-7 years     Medications:  - propranolol  - mesalamine  - rosuvastatin    Allergies:  NKDA

## 2024-05-13 ENCOUNTER — TELEPHONE (OUTPATIENT)
Dept: GENETICS | Facility: CLINIC | Age: 59
End: 2024-05-13
Payer: COMMERCIAL

## 2024-05-13 DIAGNOSIS — Z80.1 FAMILY HX OF LUNG CANCER: ICD-10-CM

## 2024-05-13 DIAGNOSIS — D05.12 DUCTAL CARCINOMA IN SITU (DCIS) OF LEFT BREAST: ICD-10-CM

## 2024-05-13 DIAGNOSIS — C54.1 ENDOMETRIAL CANCER DETERMINED BY UTERINE BIOPSY (MULTI): ICD-10-CM

## 2024-05-13 NOTE — TELEPHONE ENCOUNTER
Spoke with Heidi Quiroz. She now wants to proceed with hereditary cancer testing. She gave her oral consent to proceed with testing as part of the Pinocular CancerNext 34-genes, plus EGFR (given family history of lung cancer). An Pinocular DNA/RNA blood test kit will be sent to the patient's home. We will request to have it overnighted hoping she can get her blood drawn prior to her upcoming surgery. She will then bring the test kit to a  outpatient blood draw lab to have their blood drawn for testing (using the test tubes provided in the kit). The sample will then be sent to [facility] for analysis. Results are typically available in 3-4 weeks, and we will call them out when they are available, and have a follow-up visit as needed.    Ludy Serna MS, [facility] [place]  Genetic Counselor  [facility]  [phone]

## 2024-05-17 ENCOUNTER — ANESTHESIA (OUTPATIENT)
Dept: OPERATING ROOM | Facility: HOSPITAL | Age: 59
End: 2024-05-17
Payer: COMMERCIAL

## 2024-05-17 ENCOUNTER — HOSPITAL ENCOUNTER (OUTPATIENT)
Facility: HOSPITAL | Age: 59
Setting detail: SURGERY ADMIT
Discharge: HOME | End: 2024-05-17
Attending: STUDENT IN AN ORGANIZED HEALTH CARE EDUCATION/TRAINING PROGRAM | Admitting: STUDENT IN AN ORGANIZED HEALTH CARE EDUCATION/TRAINING PROGRAM
Payer: COMMERCIAL

## 2024-05-17 ENCOUNTER — ANESTHESIA EVENT (OUTPATIENT)
Dept: OPERATING ROOM | Facility: HOSPITAL | Age: 59
End: 2024-05-17
Payer: COMMERCIAL

## 2024-05-17 VITALS
HEIGHT: 63 IN | DIASTOLIC BLOOD PRESSURE: 68 MMHG | RESPIRATION RATE: 14 BRPM | WEIGHT: 209.22 LBS | BODY MASS INDEX: 37.07 KG/M2 | TEMPERATURE: 96.8 F | SYSTOLIC BLOOD PRESSURE: 125 MMHG | HEART RATE: 80 BPM | OXYGEN SATURATION: 98 %

## 2024-05-17 DIAGNOSIS — Z98.890 POSTOPERATIVE STATE: Primary | ICD-10-CM

## 2024-05-17 DIAGNOSIS — G89.18 POSTOPERATIVE PAIN: ICD-10-CM

## 2024-05-17 DIAGNOSIS — C54.1 ENDOMETRIAL CANCER DETERMINED BY UTERINE BIOPSY (MULTI): ICD-10-CM

## 2024-05-17 PROCEDURE — 38570 LAPAROSCOPY LYMPH NODE BIOP: CPT | Performed by: STUDENT IN AN ORGANIZED HEALTH CARE EDUCATION/TRAINING PROGRAM

## 2024-05-17 PROCEDURE — 3700000002 HC GENERAL ANESTHESIA TIME - EACH INCREMENTAL 1 MINUTE: Performed by: STUDENT IN AN ORGANIZED HEALTH CARE EDUCATION/TRAINING PROGRAM

## 2024-05-17 PROCEDURE — A58571 PR LAPAROSCOPY W TOT HYSTERECTUTERUS <=250 GRAM  W TUBE/OVARY: Performed by: ANESTHESIOLOGY

## 2024-05-17 PROCEDURE — 3600000004 HC OR TIME - INITIAL BASE CHARGE - PROCEDURE LEVEL FOUR: Performed by: STUDENT IN AN ORGANIZED HEALTH CARE EDUCATION/TRAINING PROGRAM

## 2024-05-17 PROCEDURE — A58571 PR LAPAROSCOPY W TOT HYSTERECTUTERUS <=250 GRAM  W TUBE/OVARY: Performed by: NURSE ANESTHETIST, CERTIFIED REGISTERED

## 2024-05-17 PROCEDURE — 2500000001 HC RX 250 WO HCPCS SELF ADMINISTERED DRUGS (ALT 637 FOR MEDICARE OP): Performed by: NURSE ANESTHETIST, CERTIFIED REGISTERED

## 2024-05-17 PROCEDURE — 3700000001 HC GENERAL ANESTHESIA TIME - INITIAL BASE CHARGE: Performed by: STUDENT IN AN ORGANIZED HEALTH CARE EDUCATION/TRAINING PROGRAM

## 2024-05-17 PROCEDURE — 2500000004 HC RX 250 GENERAL PHARMACY W/ HCPCS (ALT 636 FOR OP/ED): Performed by: NURSE ANESTHETIST, CERTIFIED REGISTERED

## 2024-05-17 PROCEDURE — 88309 TISSUE EXAM BY PATHOLOGIST: CPT | Performed by: PATHOLOGY

## 2024-05-17 PROCEDURE — 2500000004 HC RX 250 GENERAL PHARMACY W/ HCPCS (ALT 636 FOR OP/ED): Performed by: STUDENT IN AN ORGANIZED HEALTH CARE EDUCATION/TRAINING PROGRAM

## 2024-05-17 PROCEDURE — A4217 STERILE WATER/SALINE, 500 ML: HCPCS | Performed by: STUDENT IN AN ORGANIZED HEALTH CARE EDUCATION/TRAINING PROGRAM

## 2024-05-17 PROCEDURE — 38900 IO MAP OF SENT LYMPH NODE: CPT | Performed by: STUDENT IN AN ORGANIZED HEALTH CARE EDUCATION/TRAINING PROGRAM

## 2024-05-17 PROCEDURE — 58571 TLH W/T/O 250 G OR LESS: CPT | Performed by: STUDENT IN AN ORGANIZED HEALTH CARE EDUCATION/TRAINING PROGRAM

## 2024-05-17 PROCEDURE — 2500000001 HC RX 250 WO HCPCS SELF ADMINISTERED DRUGS (ALT 637 FOR MEDICARE OP): Performed by: STUDENT IN AN ORGANIZED HEALTH CARE EDUCATION/TRAINING PROGRAM

## 2024-05-17 PROCEDURE — 88307 TISSUE EXAM BY PATHOLOGIST: CPT | Performed by: PATHOLOGY

## 2024-05-17 PROCEDURE — 2720000007 HC OR 272 NO HCPCS: Performed by: STUDENT IN AN ORGANIZED HEALTH CARE EDUCATION/TRAINING PROGRAM

## 2024-05-17 PROCEDURE — 3600000009 HC OR TIME - EACH INCREMENTAL 1 MINUTE - PROCEDURE LEVEL FOUR: Performed by: STUDENT IN AN ORGANIZED HEALTH CARE EDUCATION/TRAINING PROGRAM

## 2024-05-17 PROCEDURE — 96372 THER/PROPH/DIAG INJ SC/IM: CPT | Performed by: STUDENT IN AN ORGANIZED HEALTH CARE EDUCATION/TRAINING PROGRAM

## 2024-05-17 PROCEDURE — 7100000009 HC PHASE TWO TIME - INITIAL BASE CHARGE: Performed by: STUDENT IN AN ORGANIZED HEALTH CARE EDUCATION/TRAINING PROGRAM

## 2024-05-17 PROCEDURE — 7100000010 HC PHASE TWO TIME - EACH INCREMENTAL 1 MINUTE: Performed by: STUDENT IN AN ORGANIZED HEALTH CARE EDUCATION/TRAINING PROGRAM

## 2024-05-17 PROCEDURE — 7100000002 HC RECOVERY ROOM TIME - EACH INCREMENTAL 1 MINUTE: Performed by: STUDENT IN AN ORGANIZED HEALTH CARE EDUCATION/TRAINING PROGRAM

## 2024-05-17 PROCEDURE — 7100000001 HC RECOVERY ROOM TIME - INITIAL BASE CHARGE: Performed by: STUDENT IN AN ORGANIZED HEALTH CARE EDUCATION/TRAINING PROGRAM

## 2024-05-17 PROCEDURE — 2500000005 HC RX 250 GENERAL PHARMACY W/O HCPCS: Performed by: NURSE ANESTHETIST, CERTIFIED REGISTERED

## 2024-05-17 PROCEDURE — 2500000005 HC RX 250 GENERAL PHARMACY W/O HCPCS: Performed by: STUDENT IN AN ORGANIZED HEALTH CARE EDUCATION/TRAINING PROGRAM

## 2024-05-17 PROCEDURE — 88307 TISSUE EXAM BY PATHOLOGIST: CPT | Mod: TC,SUR | Performed by: STUDENT IN AN ORGANIZED HEALTH CARE EDUCATION/TRAINING PROGRAM

## 2024-05-17 RX ORDER — KETOROLAC TROMETHAMINE 30 MG/ML
INJECTION, SOLUTION INTRAMUSCULAR; INTRAVENOUS AS NEEDED
Status: DISCONTINUED | OUTPATIENT
Start: 2024-05-17 | End: 2024-05-17

## 2024-05-17 RX ORDER — HYDROMORPHONE HYDROCHLORIDE 1 MG/ML
0.5 INJECTION, SOLUTION INTRAMUSCULAR; INTRAVENOUS; SUBCUTANEOUS EVERY 5 MIN PRN
Status: DISCONTINUED | OUTPATIENT
Start: 2024-05-17 | End: 2024-05-17 | Stop reason: HOSPADM

## 2024-05-17 RX ORDER — OXYCODONE HYDROCHLORIDE 5 MG/1
10 TABLET ORAL EVERY 4 HOURS PRN
Status: DISCONTINUED | OUTPATIENT
Start: 2024-05-17 | End: 2024-05-17 | Stop reason: HOSPADM

## 2024-05-17 RX ORDER — INDOCYANINE GREEN AND WATER 25 MG
KIT INJECTION AS NEEDED
Status: DISCONTINUED | OUTPATIENT
Start: 2024-05-17 | End: 2024-05-17 | Stop reason: HOSPADM

## 2024-05-17 RX ORDER — SODIUM CHLORIDE 0.9 G/100ML
IRRIGANT IRRIGATION AS NEEDED
Status: DISCONTINUED | OUTPATIENT
Start: 2024-05-17 | End: 2024-05-17 | Stop reason: HOSPADM

## 2024-05-17 RX ORDER — ACETAMINOPHEN 325 MG/1
975 TABLET ORAL ONCE
Status: COMPLETED | OUTPATIENT
Start: 2024-05-17 | End: 2024-05-17

## 2024-05-17 RX ORDER — MIDAZOLAM HYDROCHLORIDE 1 MG/ML
1 INJECTION INTRAMUSCULAR; INTRAVENOUS ONCE AS NEEDED
Status: DISCONTINUED | OUTPATIENT
Start: 2024-05-17 | End: 2024-05-17 | Stop reason: HOSPADM

## 2024-05-17 RX ORDER — HEPARIN SODIUM 5000 [USP'U]/ML
5000 INJECTION, SOLUTION INTRAVENOUS; SUBCUTANEOUS ONCE
Status: COMPLETED | OUTPATIENT
Start: 2024-05-17 | End: 2024-05-17

## 2024-05-17 RX ORDER — HYDROMORPHONE HYDROCHLORIDE 1 MG/ML
0.2 INJECTION, SOLUTION INTRAMUSCULAR; INTRAVENOUS; SUBCUTANEOUS EVERY 5 MIN PRN
Status: DISCONTINUED | OUTPATIENT
Start: 2024-05-17 | End: 2024-05-17 | Stop reason: HOSPADM

## 2024-05-17 RX ORDER — ACETAMINOPHEN 325 MG/1
650 TABLET ORAL EVERY 4 HOURS PRN
Status: DISCONTINUED | OUTPATIENT
Start: 2024-05-17 | End: 2024-05-17 | Stop reason: HOSPADM

## 2024-05-17 RX ORDER — BUPIVACAINE HYDROCHLORIDE 5 MG/ML
INJECTION, SOLUTION PERINEURAL AS NEEDED
Status: DISCONTINUED | OUTPATIENT
Start: 2024-05-17 | End: 2024-05-17 | Stop reason: HOSPADM

## 2024-05-17 RX ORDER — OXYCODONE AND ACETAMINOPHEN 5; 325 MG/1; MG/1
1 TABLET ORAL EVERY 4 HOURS PRN
Status: DISCONTINUED | OUTPATIENT
Start: 2024-05-17 | End: 2024-05-17 | Stop reason: HOSPADM

## 2024-05-17 RX ORDER — CELECOXIB 200 MG/1
400 CAPSULE ORAL ONCE
Status: COMPLETED | OUTPATIENT
Start: 2024-05-17 | End: 2024-05-17

## 2024-05-17 RX ORDER — ONDANSETRON 4 MG/1
4 TABLET, FILM COATED ORAL EVERY 6 HOURS PRN
Qty: 10 TABLET | Refills: 0 | Status: SHIPPED | OUTPATIENT
Start: 2024-05-17

## 2024-05-17 RX ORDER — PHENYLEPHRINE HCL IN 0.9% NACL 0.4MG/10ML
SYRINGE (ML) INTRAVENOUS AS NEEDED
Status: DISCONTINUED | OUTPATIENT
Start: 2024-05-17 | End: 2024-05-17

## 2024-05-17 RX ORDER — IBUPROFEN 600 MG/1
600 TABLET ORAL EVERY 6 HOURS PRN
Qty: 60 TABLET | Refills: 3 | Status: SHIPPED | OUTPATIENT
Start: 2024-05-17 | End: 2024-05-17 | Stop reason: HOSPADM

## 2024-05-17 RX ORDER — LIDOCAINE HYDROCHLORIDE 10 MG/ML
0.1 INJECTION, SOLUTION EPIDURAL; INFILTRATION; INTRACAUDAL; PERINEURAL ONCE
Status: DISCONTINUED | OUTPATIENT
Start: 2024-05-17 | End: 2024-05-17 | Stop reason: HOSPADM

## 2024-05-17 RX ORDER — LIDOCAINE HYDROCHLORIDE 20 MG/ML
INJECTION, SOLUTION INFILTRATION; PERINEURAL AS NEEDED
Status: DISCONTINUED | OUTPATIENT
Start: 2024-05-17 | End: 2024-05-17

## 2024-05-17 RX ORDER — TRAMADOL HYDROCHLORIDE 50 MG/1
50 TABLET ORAL EVERY 6 HOURS PRN
Qty: 12 TABLET | Refills: 0 | Status: SHIPPED | OUTPATIENT
Start: 2024-05-17

## 2024-05-17 RX ORDER — NORETHINDRONE AND ETHINYL ESTRADIOL 0.5-0.035
KIT ORAL AS NEEDED
Status: DISCONTINUED | OUTPATIENT
Start: 2024-05-17 | End: 2024-05-17

## 2024-05-17 RX ORDER — LIDOCAINE HYDROCHLORIDE 40 MG/ML
SOLUTION TOPICAL AS NEEDED
Status: DISCONTINUED | OUTPATIENT
Start: 2024-05-17 | End: 2024-05-17

## 2024-05-17 RX ORDER — LABETALOL HYDROCHLORIDE 5 MG/ML
5 INJECTION, SOLUTION INTRAVENOUS ONCE AS NEEDED
Status: DISCONTINUED | OUTPATIENT
Start: 2024-05-17 | End: 2024-05-17 | Stop reason: HOSPADM

## 2024-05-17 RX ORDER — GABAPENTIN 600 MG/1
600 TABLET ORAL ONCE
Status: COMPLETED | OUTPATIENT
Start: 2024-05-17 | End: 2024-05-17

## 2024-05-17 RX ORDER — MIDAZOLAM HYDROCHLORIDE 1 MG/ML
INJECTION INTRAMUSCULAR; INTRAVENOUS AS NEEDED
Status: DISCONTINUED | OUTPATIENT
Start: 2024-05-17 | End: 2024-05-17

## 2024-05-17 RX ORDER — WATER 1 ML/ML
IRRIGANT IRRIGATION AS NEEDED
Status: DISCONTINUED | OUTPATIENT
Start: 2024-05-17 | End: 2024-05-17 | Stop reason: HOSPADM

## 2024-05-17 RX ORDER — INDOCYANINE GREEN AND WATER 25 MG
KIT INJECTION
Status: DISCONTINUED
Start: 2024-05-17 | End: 2024-05-17 | Stop reason: HOSPADM

## 2024-05-17 RX ORDER — POLYETHYLENE GLYCOL 3350 17 G/17G
17 POWDER, FOR SOLUTION ORAL DAILY PRN
Qty: 20 PACKET | Refills: 11 | Status: SHIPPED | OUTPATIENT
Start: 2024-05-17

## 2024-05-17 RX ORDER — DROPERIDOL 2.5 MG/ML
0.62 INJECTION, SOLUTION INTRAMUSCULAR; INTRAVENOUS ONCE AS NEEDED
Status: DISCONTINUED | OUTPATIENT
Start: 2024-05-17 | End: 2024-05-17 | Stop reason: HOSPADM

## 2024-05-17 RX ORDER — ROCURONIUM BROMIDE 10 MG/ML
INJECTION, SOLUTION INTRAVENOUS AS NEEDED
Status: DISCONTINUED | OUTPATIENT
Start: 2024-05-17 | End: 2024-05-17

## 2024-05-17 RX ORDER — METOCLOPRAMIDE HYDROCHLORIDE 5 MG/ML
10 INJECTION INTRAMUSCULAR; INTRAVENOUS ONCE AS NEEDED
Status: DISCONTINUED | OUTPATIENT
Start: 2024-05-17 | End: 2024-05-17 | Stop reason: HOSPADM

## 2024-05-17 RX ORDER — SODIUM CHLORIDE, SODIUM LACTATE, POTASSIUM CHLORIDE, CALCIUM CHLORIDE 600; 310; 30; 20 MG/100ML; MG/100ML; MG/100ML; MG/100ML
100 INJECTION, SOLUTION INTRAVENOUS CONTINUOUS
Status: DISCONTINUED | OUTPATIENT
Start: 2024-05-17 | End: 2024-05-17 | Stop reason: HOSPADM

## 2024-05-17 RX ORDER — ACETAMINOPHEN 325 MG/1
975 TABLET ORAL EVERY 6 HOURS PRN
Qty: 90 TABLET | Refills: 0 | Status: SHIPPED | OUTPATIENT
Start: 2024-05-17

## 2024-05-17 RX ORDER — FENTANYL CITRATE 50 UG/ML
INJECTION, SOLUTION INTRAMUSCULAR; INTRAVENOUS AS NEEDED
Status: DISCONTINUED | OUTPATIENT
Start: 2024-05-17 | End: 2024-05-17

## 2024-05-17 RX ORDER — PROPOFOL 10 MG/ML
INJECTION, EMULSION INTRAVENOUS AS NEEDED
Status: DISCONTINUED | OUTPATIENT
Start: 2024-05-17 | End: 2024-05-17

## 2024-05-17 RX ORDER — CEFAZOLIN 1 G/1
INJECTION, POWDER, FOR SOLUTION INTRAVENOUS AS NEEDED
Status: DISCONTINUED | OUTPATIENT
Start: 2024-05-17 | End: 2024-05-17

## 2024-05-17 RX ORDER — SODIUM CHLORIDE, SODIUM LACTATE, POTASSIUM CHLORIDE, CALCIUM CHLORIDE 600; 310; 30; 20 MG/100ML; MG/100ML; MG/100ML; MG/100ML
INJECTION, SOLUTION INTRAVENOUS CONTINUOUS PRN
Status: DISCONTINUED | OUTPATIENT
Start: 2024-05-17 | End: 2024-05-17

## 2024-05-17 RX ORDER — ONDANSETRON HYDROCHLORIDE 2 MG/ML
INJECTION, SOLUTION INTRAVENOUS AS NEEDED
Status: DISCONTINUED | OUTPATIENT
Start: 2024-05-17 | End: 2024-05-17

## 2024-05-17 RX ADMIN — PROPOFOL 100 MG: 10 INJECTION, EMULSION INTRAVENOUS at 08:50

## 2024-05-17 RX ADMIN — CEFAZOLIN 2 G: 1 INJECTION, POWDER, FOR SOLUTION INTRAMUSCULAR; INTRAVENOUS at 09:00

## 2024-05-17 RX ADMIN — CELECOXIB 400 MG: 200 CAPSULE ORAL at 07:36

## 2024-05-17 RX ADMIN — SUGAMMADEX 200 MG: 100 INJECTION, SOLUTION INTRAVENOUS at 11:28

## 2024-05-17 RX ADMIN — ACETAMINOPHEN 975 MG: 325 TABLET ORAL at 07:36

## 2024-05-17 RX ADMIN — ROCURONIUM BROMIDE 10 MG: 10 INJECTION INTRAVENOUS at 10:22

## 2024-05-17 RX ADMIN — ROCURONIUM BROMIDE 80 MG: 10 INJECTION INTRAVENOUS at 08:50

## 2024-05-17 RX ADMIN — ACETAMINOPHEN 650 MG: 325 TABLET ORAL at 12:35

## 2024-05-17 RX ADMIN — EPHEDRINE SULFATE 10 MG: 50 INJECTION, SOLUTION INTRAVENOUS at 09:10

## 2024-05-17 RX ADMIN — PROPOFOL 50 MG: 10 INJECTION, EMULSION INTRAVENOUS at 09:38

## 2024-05-17 RX ADMIN — HEPARIN SODIUM 5000 UNITS: 5000 INJECTION INTRAVENOUS; SUBCUTANEOUS at 08:33

## 2024-05-17 RX ADMIN — Medication 30 MG: at 08:56

## 2024-05-17 RX ADMIN — FENTANYL CITRATE 50 MCG: 50 INJECTION, SOLUTION INTRAMUSCULAR; INTRAVENOUS at 08:49

## 2024-05-17 RX ADMIN — FENTANYL CITRATE 50 MCG: 50 INJECTION, SOLUTION INTRAMUSCULAR; INTRAVENOUS at 09:24

## 2024-05-17 RX ADMIN — LIDOCAINE HYDROCHLORIDE 4 ML: 40 SOLUTION TOPICAL at 08:54

## 2024-05-17 RX ADMIN — PROPOFOL 50 MG: 10 INJECTION, EMULSION INTRAVENOUS at 08:54

## 2024-05-17 RX ADMIN — GABAPENTIN 600 MG: 300 CAPSULE ORAL at 07:36

## 2024-05-17 RX ADMIN — ROCURONIUM BROMIDE 10 MG: 10 INJECTION INTRAVENOUS at 10:13

## 2024-05-17 RX ADMIN — Medication 80 MCG: at 09:05

## 2024-05-17 RX ADMIN — SODIUM CHLORIDE, SODIUM LACTATE, POTASSIUM CHLORIDE, CALCIUM CHLORIDE: 600; 310; 30; 20 INJECTION, SOLUTION INTRAVENOUS at 09:00

## 2024-05-17 RX ADMIN — MIDAZOLAM HYDROCHLORIDE 2 MG: 1 INJECTION, SOLUTION INTRAMUSCULAR; INTRAVENOUS at 08:42

## 2024-05-17 RX ADMIN — ONDANSETRON 4 MG: 2 INJECTION INTRAMUSCULAR; INTRAVENOUS at 11:03

## 2024-05-17 RX ADMIN — LIDOCAINE HYDROCHLORIDE 50 MG: 20 INJECTION, SOLUTION INFILTRATION; PERINEURAL at 08:49

## 2024-05-17 RX ADMIN — SODIUM CHLORIDE, POTASSIUM CHLORIDE, SODIUM LACTATE AND CALCIUM CHLORIDE: 600; 310; 30; 20 INJECTION, SOLUTION INTRAVENOUS at 07:05

## 2024-05-17 RX ADMIN — DEXAMETHASONE SODIUM PHOSPHATE 8 MG: 4 INJECTION INTRA-ARTICULAR; INTRALESIONAL; INTRAMUSCULAR; INTRAVENOUS; SOFT TISSUE at 09:33

## 2024-05-17 RX ADMIN — EPHEDRINE SULFATE 5 MG: 50 INJECTION, SOLUTION INTRAVENOUS at 09:06

## 2024-05-17 RX ADMIN — KETOROLAC TROMETHAMINE 30 MG: 30 INJECTION, SOLUTION INTRAMUSCULAR at 11:04

## 2024-05-17 ASSESSMENT — PAIN SCALES - GENERAL
PAINLEVEL_OUTOF10: 4
PAINLEVEL_OUTOF10: 1
PAINLEVEL_OUTOF10: 0 - NO PAIN
PAINLEVEL_OUTOF10: 3
PAINLEVEL_OUTOF10: 2
PAINLEVEL_OUTOF10: 3
PAINLEVEL_OUTOF10: 1
PAINLEVEL_OUTOF10: 3
PAIN_LEVEL: 1
PAINLEVEL_OUTOF10: 2
PAINLEVEL_OUTOF10: 4
PAINLEVEL_OUTOF10: 3
PAINLEVEL_OUTOF10: 3
PAINLEVEL_OUTOF10: 2
PAINLEVEL_OUTOF10: 4
PAINLEVEL_OUTOF10: 0 - NO PAIN
PAINLEVEL_OUTOF10: 3
PAINLEVEL_OUTOF10: 2
PAINLEVEL_OUTOF10: 3
PAINLEVEL_OUTOF10: 2
PAINLEVEL_OUTOF10: 2
PAINLEVEL_OUTOF10: 4

## 2024-05-17 ASSESSMENT — PAIN - FUNCTIONAL ASSESSMENT

## 2024-05-17 NOTE — DISCHARGE INSTRUCTIONS
Laparoscopic Hysterectomy Discharge Instructions  If you have any questions about your care, please contact us at [phone].    Medications and Pain Management  Common areas of pain after laparoscopic hysterectomy include the incision pain, pain in between your shoulder blades, the pelvis and lower back. The gas that was used to distend your abdomen for the surgery is absorbed slowly into your blood stream over the first 3-4 days after surgery. It is not passed intestinally, although, because your abdomen is distended, it may feel similar to intestinal gas. Staying active and walking is the best way to promote the absorption of this gas.  Immediately after surgery, nerve pain is the most intense, typically for the first 6 to 12 hours. As the body heals, it creates inflammation around the incisions sites adding pressure and creating soreness. After 5 days, the inflammation begins to recede and significant improvement in soreness is expected. Pulling on the incisions, especially if sudden, such as when you cough, will reactivate the nerve pain. Support your abdomen with a pillow during coughing or sneezing as this will be helpful to minimize pain. There are two types of pain pills typically used for post-operative pain management, narcotics such as tramadol and an anti-inflammatory such as Ibuprofen or Naprosyn.  Taking regular anti-inflammatory pills, such as 600mg of Ibuprofen every 6 hours for the first 5 days and then as needed is recommended. You can alternate ibuprofen with tylenol (975mg or 1000mg). The tylenol can be taken every 6 hours.  If you have problems using NSAIDs, be sure to discuss this with your doctor. The narcotic can be used on a schedule for the first 1 to 2 days but after that, only as you need it. Narcotics can cause constipation, nausea, sleepiness and headaches. You may begin your usual home medications as you were taking before unless directed by your doctor.    Incisional  care  Paper tape steri-strips are typically used for the abdominal incisions. The steri-strips will fall off on their own or can be removed at your first post-operative appointment. You may shower and use a mild soap around the incisions and pat dry. Do not use a washcloth or scrub the incisions. Using peroxide or antiseptics is not recommended for routine care. Avoid hot and steamy showers as this may cause you to feel faint. No tub baths for six weeks following surgery. There may be discoloration or bruising around the incisions. This is normal and may take several weeks to resolve. Firmness or a nodular area under the skin near the incision may represent a collection of blood, this too will resolve on its own after a little time. If any incision develops tenderness, redness in the skin layer or has drainage please call the office.    Vaginal Discharge  You may have a mildly malodorous discharge and occasional spotting for up to 6 weeks. Do not put anything in the vagina like tampons or have sexual intercourse for 6 weeks after surgery. If you are having bleeding like a period, that is abnormal and you should contact your doctor.    GI Function, Nausea and Constipation  Nausea can occasionally be an issue in the first few days after surgery. It is usually caused as a side effect from the anesthesia and pain medicine, particularly narcotics. Taking the pain pills with food is a food way to proactively minimize this. Throwing up, especially after the first day, is not expected and if this happens, you should call your doctor. Feeling gassy and constipation can be a problem for the first week after surgery. Limiting the use of narcotics may be helpful. Stool softeners twice a day and a high fiber diet are safe. If needed, Miralax once daily is a good choice. If no bowel movement after 3 days, you will need to increase the Miralax until soft, regular bowel movements are passing.    Urinating  Because the bladder is  disturbed by the surgery, the normal sensation may be temporarily altered. You may not be aware that your bladder is full. If the bladder is allowed to get over distended, it may make the problem worse. This is why we make sure that you are able to empty your bladder adequately before you go home. For the first few days at home, you should make a point to empty your bladder every 3 to 4 hours. Pain with voiding, especially after the first day, is not expected and may represent a bladder infection.    Activity  For the first two days post-operatively, your soreness and recovery from anesthesia will limit your activity  Stairs are safe, just take your time  At a minimum during this time, you should walk around for 10-15 minutes every 2-3 hours. After that, in the first week, any activity except for overt exercise is safe.   During the first week you should not commit to being on your feet for more than 30 minutes at a time.   During the second week, light exercise is encouraged.  After 2 weeks from surgery, you should try to get back into regular activity other than heavy lifting.   For healing, please limit the amount of weight lifted to 8-10 pounds (a gallon of milk) for the first 6 weeks after surgery.   Driving is usually okay after the first few days. You must be able to comfortably wear a seatbelt, press the gas/brake pedals, and drive defensively. You may not drive while taking narcotic pain medicines.    When to Call the Doctor  Call for any fever above 100.4 F (If you do not feel feverish you do not have to routinely check your temperature.)  Call for severe pain not improved by medications  Call for persistent nausea, vomiting  Call for vaginal bleeding that is heavy as a period or passing blood clots larger than a quarter  Call for unusual swelling in your legs  Call if the incisions develop painful redness and discharge    In an emergency, call 911 or go to an Emergency Department at a nearby hospital

## 2024-05-17 NOTE — OP NOTE
"Total laparoscopic hysterectomy, bilateral salpingo-oophorectomy (B), [place] lymph node mapping and biopsy (B) Operative Note     Date: 2024  OR Location: [facility] OR    Name: Heidi Quiroz \"Chelita\", : 1965, Age: 59 y.o., MRN: [id number], Sex: female    Diagnosis  Pre-op Diagnosis     * Endometrial cancer determined by uterine biopsy (Multi) [C54.1] Post-op Diagnosis     * Endometrial cancer determined by uterine biopsy (Multi) [C54.1]     Procedures  Total laparoscopic hysterectomy, bilateral salpingo-oophorectomy  78111 - [place] LAPAROSCOPY W TOTAL HYSTERECTOMY UTERUS 250 GM/<    [place] lymph node mapping and biopsy  12260 - [place] PEL LMPHADEC W/XTRNL ILIAC HYPOGSTR&OBTURATOR      Surgeons      * Anahy Eckert - Primary    Resident/Fellow/Other Assistant:  Surgeons and Role:     * Sravanthi Ding MD - Resident - Assisting     * Sravanthi Waldron MD - Resident - Assisting    Procedure Summary  Anesthesia: General  ASA: II  Anesthesia Staff: Anesthesiologist: Ankit Bartlett MD  CRNA: SHAYLEE Joshi-CRNA  Estimated Blood Loss: 50mL  Intra-op Medications:   Administrations occurring from 0800 to 1130 on 24:   Medication Name Total Dose   sodium chloride 0.9 % irrigation solution 1,000 mL   surgical lubricant gel 1 Application   BUPivacaine HCl (Marcaine) 0.5 % (5 mg/mL) injection 12 mL   indocyanine green (IC-Green) injection 25 mg   sterile water irrigation solution 1,000 mL   heparin (porcine) injection 5,000 Units 5,000 Units              Anesthesia Record               Intraprocedure I/O Totals          Intake    LR infusion 400.00 mL    Total Intake 400 mL       Output    Urine 195 mL    Total Blood Loss - Surgical Delivery (mL) 50 mL    Total Output 245 mL       Net    Net Volume 155 mL       Other (could not be determined as input or output)    Surgical Delivery Estimated Blood Loss (mL) 50          Specimen:   ID Type Source Tests Collected by Time   1 : RIGHT PELVIC SENTINEL LYMPH " NODE Tissue SENTINEL LYMPH NODE, PELVIC SURGICAL PATHOLOGY EXAM Anahy Eckert MD 5/17/2024 0949   2 : LEFT PELVIC SENTINEL LYMPH NODE Tissue SENTINEL LYMPH NODE, PELVIC SURGICAL PATHOLOGY EXAM Anahy Eckert MD 5/17/2024 0949   3 : UTERUS, CERVIX, BILATERAL TUBES AND OVARIES Tissue UTERUS, CERVIX, FALLOPIAN TUBES AND OVARIES BILATERAL SURGICAL PATHOLOGY EXAM Anahy Eckert MD 5/17/2024 1037        Staff:   Circulator: Cynthia Reid RN  Relief Circulator: Radha Sharma RN  Scrub Person: Lotus Lantigua; Marija Perales RN         Drains and/or Catheters:   NG/OG/Feeding Tube OG - [place] sump 18 Fr Center mouth (Active)       Urethral Catheter Non-latex 16 Fr. (Active)       Tourniquet Times:         Implants:     Findings: 9w uterus with normal fallopian tubes and ovaries. Normal appearing cervix and vagina. Bilateral mapping of sentinel lymph nodes to the external iliac vessels on the right and to the obturator space on the left. Smooth peritoneal surfaces, normal omentum, liver edge, bowel.     Indications: Chelita Quiroz is an 59 y.o. female who is having surgery for Endometrial cancer determined by uterine biopsy (Multi) [C54.1]    Please start case at 8:00 am.     The patient was seen in the preoperative area. The risks, benefits, complications, treatment options, non-operative alternatives, expected recovery and outcomes were discussed with the patient. The possibilities of reaction to medication, pulmonary aspiration, injury to surrounding structures, bleeding, recurrent infection, the need for additional procedures, failure to diagnose a condition, and creating a complication requiring transfusion or operation were discussed with the patient. The patient concurred with the proposed plan, giving informed consent.  The site of surgery was properly noted/marked if necessary per policy. The patient has been actively warmed in preoperative area. Preoperative antibiotics have been  ordered and given within 1 hours of incision. Venous thrombosis prophylaxis have been ordered including bilateral sequential compression devices and chemical prophylaxis    Procedure Details:   After informed consent was confirmed, the patient was taken to the operating room with an IV in place. A preoperative Huddle and timeout were performed, with all OR personnel confirming correct patient and procedure. The patient was moved to the operating room table and SCDs were placed.  Heparin was administered.  General anesthesia was induced. She was then placed in the dorsal lithotomy position on the operating room table using Lionel stirrups. She was prepped and draped in a normal sterile fashion for a laparoscopic hysterectomy. A surgical pause was performed. The patient's identity and surgical procedure were again confirmed by all the surgical personnel. The patient received preoperative antibiotics.    A streeter catheter was placed. We then started with the vaginal portion of the operation. A bivalve speculum was placed in the vagina, and the cervix was visualized.  Indocyanine green dye was then injected at 3 o’clock and 9 o’clock, 1cc superficially and 1cc deep at each location, for a total of 4cc.  A [employer] system was then placed as a uterine manipulator.  The speculum was then removed.      We then turned our attention to the laparoscopic portion of the operation after donning new sterile gloves. The skin superior to the supraumbilical area was infiltrated with local anesthetic. A 12mm vertical skin incision was made. This was carried down to the level of the fascia with two S retractors. The fascia was elevated with 2 kocher clamps and incised with a scalpel.  Both sides of the fascia were then tagged with 0-vicryl suture.  The peritoneum was then elevated with 2 hemostats and was entered sharply with metzenbaum scissors.  The verónica port was then placed. CO2 was then insufflated into the abdomen.     Once this was  accomplished, we then carefully inspected the abdomen and there was no evidence of injury. The patient was placed in deep Trendelenburg. We then placed three 5-mm accessory ports.  All were placed under direct visualization after infiltration with Marcaine.  The small bowel was manipulated out of the pelvis.     We then toggled back and forth from regular view to firefly mode to perform a bilateral sentinel pelvic lymph node dissection. The uterus was placed on traction. The round ligaments were sealed and divided with the bovie device. The broad ligament was dissected cephalad and caudad, and a bladder flap was created. This was carried to the opposite side, where the round ligament was also divided. The bladder was dissected away from the cervix; it was noted to be free of disease. The pararectal and paravesical spaces were developed bilaterally. The parametria appeared free of disease bilaterally. Lymph nodes were then visualized bilaterally and harvested off of the bilateral external iliac vessels. Hemostasis was secure. We carefully inspected the areas and there was no evidence of bleeding. The obturator and genitofemoral nerves were  conserved.  The lymph nodes were removed through the laparoscopic port and sent to pathology for permanent section.     We then proceeded with the hysterectomy.   The infundibulopelvic ligaments were then isolated away from the ureters which were well visualized bilaterally.  A window was created between the two and the IP ligaments were cauterized and then divided with the ligasure device.      Adhesions were noted between the sigmoid colon and pelvic sidewall on the patient's left.  These were taken down with sharp dissection.  The anterior and posterior leaves of the broad ligament were bluntly .   At the vesicouterine fold the peritoneum was incised transversely and the bladder sharply dissected off the lower uterine segment and upper vagina.  The posterior peritoneum  was gently brought down from the uterus.  The uterine vessels were skeletonized then cauterized and divided with the ligasure device followed by the cardinal ligaments and the uterosacral ligaments in a similar fashion.      We then began the colpotomy. Monopolar bovie was used and the cervix was released circumferentially over the cervical cup.  The uterus, tubes and ovaries and cervix were delivered transvaginally without difficulty.       The cuff was closed laparoscopically with 0 Maxon v-loc suture.  Hemostasis was achieved.  The abdomen was copiously irrigated.  Hemostasis was noted.     The 5mm ports were all removed under direct visualization.  The 12mm port was removed and the fascia was then closed with another figure-of-eight suture of 0-vicryl followed by tying the two previously-tagged end of fascia together.  The port sites were all irrigated.  Hemostasis was noted.  Ports were closed using 4-0 vicryl in a subcuticular fashion, followed by steristrips.      The streeter was removed and the balloon was removed from the vagina.     Sponge, needle, instrument counts were correct x 2.      Complications:  None; patient tolerated the procedure well.    Disposition: PACU - hemodynamically stable.  Condition: stable         Additional Details: None    Attending Attestation: I was present and scrubbed for the entire procedure.    Anahy Eckert  Phone Number: [phone]

## 2024-05-17 NOTE — ANESTHESIA PROCEDURE NOTES
Airway  Date/Time: 5/17/2024 8:55 AM  Urgency: elective    Airway not difficult    Staffing  Performed: CRNA   Authorized by: Ankit Bartlett MD    Performed by: SHAYLEE Joshi-BETH  Patient location during procedure: OR    Indications and Patient Condition  Indications for airway management: anesthesia  Spontaneous Ventilation: absent  Sedation level: deep  Preoxygenated: yes  Patient position: ramp  Mask difficulty assessment: 1 - vent by mask    Final Airway Details  Final airway type: endotracheal airway      Successful airway: ETT  Cuffed: yes   Successful intubation technique: direct laryngoscopy  Facilitating devices/methods: intubating stylet  Blade: Patrica  Blade size: #3  ETT size (mm): 7.0  Cormack-Lehane Classification: grade III - view of epiglottis only  Placement verified by: chest auscultation and capnometry   Measured from: lips  ETT to lips (cm): 21  Number of attempts at approach: 1    Additional Comments  Very anterior.  Atraumatic, lips and teeth in pre op condition.

## 2024-05-17 NOTE — ANESTHESIA POSTPROCEDURE EVALUATION
"Patient: Heidi Quiroz \"Chelita\"    Procedure Summary       Date: 05/17/24 Room / Location: [facility] 03 / [facility]    Anesthesia Start: 0840 Anesthesia Stop: 1140    Procedures:       Total laparoscopic hysterectomy, bilateral salpingo-oophorectomy (Bilateral)      [place] lymph node mapping and biopsy (Bilateral) Diagnosis:       Endometrial cancer determined by uterine biopsy (Multi)      (Endometrial cancer determined by uterine biopsy (Multi) [C54.1])      ()      (Please start case at 8:00 am)    Surgeons: Anahy Eckert MD Responsible Provider: Ankit Bartlett MD    Anesthesia Type: general ASA Status: 2            Anesthesia Type: general    Vitals Value Taken Time   /52 05/17/24 1215   Temp 36 °C (96.8 °F) 05/17/24 1140   Pulse 70 05/17/24 1220   Resp 12 05/17/24 1220   SpO2 91 % 05/17/24 1220   Vitals shown include unfiled device data.    Anesthesia Post Evaluation    Patient location during evaluation: PACU  Patient participation: complete - patient participated  Level of consciousness: awake and alert  Pain score: 1  Pain management: adequate  Airway patency: patent  Cardiovascular status: acceptable  Respiratory status: acceptable  Hydration status: acceptable  Postoperative Nausea and Vomiting: none        No notable events documented.    "

## 2024-05-17 NOTE — PROGRESS NOTES
"Pharmacy Medication History Review    Heidi Quiroz \"Chelita\" is a 59 y.o. female admitted for Endometrial cancer determined by uterine biopsy (Multi). Pharmacy reviewed the patient's [id number] medications and allergies for accuracy.    The list below reflects the updated PTA list. Comments regarding how patient may be taking medications differently can be found in the Admit Orders Activity  Prior to Admission Medications   Prescriptions Last Dose Informant   acetaminophen (Tylenol) 500 mg tablet Past Week Self   Sig: Take 1 tablet (500 mg) by mouth every 6 hours if needed for mild pain (1 - 3).   amoxicillin (Amoxil) 500 mg tablet More than a month Self   Sig: Take 4 tablets (2,000 mg) by mouth once daily. 1 hour prior to dental appointments   cholecalciferol (Vitamin D-3) 50 mcg (2,000 unit) capsule 5/16/2024 Self   Sig: Take 1 capsule (50 mcg) by mouth early in the morning..   mesalamine (Lialda) 1.2 gram EC tablet 5/16/2024 Self   Sig: TAKE 4 TABLETS BY MOUTH ONCE DAILY WITH THE EVENING MEAL.   Patient taking differently: Take 4 tablets (4.8 g) by mouth once daily in the evening. Take with meals. Takes 2 in AM and 2 in PM   mesalamine (Lialda) 1.2 gram EC tablet  Self   Sig: Take 4 tablets (4.8 g) by mouth once daily in the evening. Take with meals.   Patient not taking: Reported on 5/1/2024   miSOPROStoL (Cytotec) 200 mcg tablet     Sig: Take 1 tablet (200 mcg) by mouth 1 time for 1 dose. Take 1 tablet by mouth the night before the biopsy.   Patient not taking: Reported on 5/1/2024   propranolol LA (Inderal LA) 80 mg 24 hr capsule 5/17/2024 Self   Sig: TAKE 1 CAPSULE (80 MG) BY MOUTH ONCE DAILY. DO NOT CRUSH, CHEW, OR SPLIT.   rosuvastatin (Crestor) 20 mg tablet 5/16/2024 Self   Sig: Take 1 tablet (20 mg) by mouth once daily.      Facility-Administered Medications: None        The list below reflects the updated allergy list. Please review each documented allergy for additional clarification and " justification.  Allergies  Reviewed by Jayashree Dumas, RN on 5/17/2024   No Known Allergies         Patient was unable to be assessed for M2B at discharge. Pharmacy has been updated to [facility]. M2B service not offered prior to surgery, please reassess prior to patient discharge if Meds to Beds is desired.     Sources used to complete the med history include: Patient interview - excellent historian of medications/ Pharmacy - [facility]/ Chart review    Jacqueline Damon PharmD  Transitions of Care Pharmacist  [facility]  Please reach out via Secure Chat for questions, or if no response call [employer] or [facility]

## 2024-05-17 NOTE — H&P
Interval H&P update    H&P 4/25 by Dr. Eckert reviewed. The patient was examined and there are no changes to the H&P.    General: Well appearing, alert  HEENT: normocephalic, EOMI, clear sclera  Cardio: Warm and well perfused  Resp: breathing comfortably on room air  Abd: nondistended  Neuro: grossly intact, no focal deficits  Extremities: full ROM  Psych: A&O x3, appropriate mood and affect    A/P: 60 yo female with PMHx most significant for recent L breast DCIS s/p lumpectomy & re-excision and ulcerative colitis who is presenting today for TLH, BSO, SLND.     Proceed with surgery as planned.    Sravanthi Ding MD  PGY-2, Obstetrics and Gynecology

## 2024-05-17 NOTE — ANESTHESIA PREPROCEDURE EVALUATION
"Patient: Heidi Quiroz \"Chelita\"    Procedure Information       Date/Time: 05/17/24 0800    Procedures:       Total laparoscopic hysterectomy, bilateral salpingo-oophorectomy (Bilateral)      [place] lymph node mapping and biopsy, any other indicated procedures (Bilateral)    Location: [facility] OR 03 / Virtual [facility] OR    Surgeons: Anahy Eckert MD            Relevant Problems   Cardiac   (+) Hypertension   (+) Pure hypercholesterolemia      Neuro   (+) Adult situational stress disorder   (+) Sciatica of left side      GI   (+) Ulcerative colitis (Multi)      Endocrine   (+) Class 2 obesity with alveolar hypoventilation and body mass index (BMI) of 35.0 to 35.9 in adult (Multi)   (+) Hyperthyroidism   (+) Multinodular thyroid      Hematology   (+) Anemia      HEENT   (+) Bilateral sensorineural hearing loss      GYN   (+) Endometrial cancer determined by uterine biopsy (Multi)       Clinical information reviewed:    Allergies  Meds               NPO Detail:  NPO/Void Status  Date of Last Liquid: 05/17/24  Time of Last Liquid: 0515  Date of Last Solid: 05/16/24  Time of Last Solid: 2200  Last Intake Type: Clear fluids (sips with meds)         Physical Exam    Airway  Mallampati: III  TM distance: >3 FB  Neck ROM: full     Cardiovascular - normal exam     Dental - normal exam     Pulmonary - normal exam     Abdominal            Anesthesia Plan    History of general anesthesia?: yes  History of complications of general anesthesia?: no    ASA 2     general     intravenous induction   Anesthetic plan and risks discussed with patient.  Use of blood products discussed with patient who consented to blood products.    Plan discussed with CRNA and attending.      "

## 2024-05-18 ENCOUNTER — TELEPHONE (OUTPATIENT)
Dept: GYNECOLOGIC ONCOLOGY | Facility: HOSPITAL | Age: 59
End: 2024-05-18
Payer: COMMERCIAL

## 2024-05-18 NOTE — TELEPHONE ENCOUNTER
Received page patient with questions about surgery. Returned call but busy signal three times.     Ana Gerard MD MPH   Gynecologic Oncology PGY7  Pager: [phone], Team Phone: [phone]

## 2024-05-20 ENCOUNTER — TELEPHONE (OUTPATIENT)
Dept: GYNECOLOGIC ONCOLOGY | Facility: HOSPITAL | Age: 59
End: 2024-05-20
Payer: COMMERCIAL

## 2024-05-22 ENCOUNTER — TELEPHONE (OUTPATIENT)
Dept: GYNECOLOGIC ONCOLOGY | Facility: HOSPITAL | Age: 59
End: 2024-05-22
Payer: COMMERCIAL

## 2024-05-24 ENCOUNTER — TELEPHONE (OUTPATIENT)
Dept: GYNECOLOGIC ONCOLOGY | Facility: HOSPITAL | Age: 59
End: 2024-05-24
Payer: COMMERCIAL

## 2024-05-24 NOTE — TELEPHONE ENCOUNTER
Confirmation of surgery, date, and type, return to work date, outpatient, admission and discharge dates. All sent to [facility] at

## 2024-05-28 ENCOUNTER — LAB (OUTPATIENT)
Dept: LAB | Facility: LAB | Age: 59
End: 2024-05-28
Payer: COMMERCIAL

## 2024-05-28 DIAGNOSIS — C54.1 ENDOMETRIAL CANCER DETERMINED BY UTERINE BIOPSY (MULTI): ICD-10-CM

## 2024-05-28 DIAGNOSIS — D05.12 DUCTAL CARCINOMA IN SITU (DCIS) OF LEFT BREAST: ICD-10-CM

## 2024-05-28 DIAGNOSIS — Z80.1 FAMILY HX OF LUNG CANCER: ICD-10-CM

## 2024-05-31 ENCOUNTER — HOSPITAL ENCOUNTER (OUTPATIENT)
Dept: RADIATION ONCOLOGY | Facility: CLINIC | Age: 59
Setting detail: RADIATION/ONCOLOGY SERIES
Discharge: HOME | End: 2024-05-31
Payer: COMMERCIAL

## 2024-05-31 VITALS
BODY MASS INDEX: 36.87 KG/M2 | OXYGEN SATURATION: 94 % | DIASTOLIC BLOOD PRESSURE: 76 MMHG | HEART RATE: 68 BPM | SYSTOLIC BLOOD PRESSURE: 119 MMHG | RESPIRATION RATE: 18 BRPM | WEIGHT: 208.11 LBS | TEMPERATURE: 98.1 F

## 2024-05-31 DIAGNOSIS — D05.12 DUCTAL CARCINOMA IN SITU (DCIS) OF LEFT BREAST: Primary | ICD-10-CM

## 2024-05-31 PROCEDURE — 99214 OFFICE O/P EST MOD 30 MIN: CPT | Performed by: NURSE PRACTITIONER

## 2024-05-31 NOTE — PROGRESS NOTES
"Radiation Oncology Follow-Up    Patient Name:  Heidi Quiroz  MRN:  [id number]  :  1965    Referring Provider: Samia Banda MD  Primary Care Provider: Holden Hare DO  Care Team: Patient Care Team:  Holden Hare DO as PCP - General (Internal Medicine)  Anahy Eckert MD as Consulting Physician (Obstetrics and Gynecology)  Ana Gerard MD as Consulting Physician (Obstetrics and Gynecology)    Date of Service: 2024    59 y.o. female with Ductal carcinoma in situ (DCIS) of left breast, Clinical: cTis (DCIS), cM0, G3, ER-     Oncologic history:    - Routine screening mammogram on 2023 which revealed grouped calcifications in the upper outer quadrant of the left breast.     - Diagnostic views performed 2023 revealed indeterminate calcifications in the upper outer quadrant. Ultrasound directed at the 1 o'clock position, 10 cm from the nipple revealed a cyst.     - On 2024 she underwent a left breast core biopsy which revealed ductal carcinoma in situ, grade 3. Estrogen receptors were negative. She underwent a left partial mastectomy on 2/15/2024. Pathology revealed a 3.6 cm ductal carcinoma in situ of the cribriform, micropapillary and solid subtypes, nuclear grade 3. The resection margins were negative although DCIS was microns from the anterior margin and less than 1 mm from the lateral and posterior margins.     - She underwent a reexcision on 2024 which was negative. Referred for radiation.     - 4/3/24 - 24: Radiation to the left breast consisting of a dose of 42.56 Gy with additional 10 Gy to the tumor bed for a total of 52.56 Gy.     - 24: Laparoscopic hysterectomy     SUBJECTIVE  History of Present Illness:   Heidi Quiroz \"Chelita\" is here today for routine radiation follow up/initial radiation survivorship visit.  She is doing well post treatment. She had laparoscopic hysterectomy 2 weeks ago and recovering well.  Endorses mild left breast tenderness and skin " intact.  Mild peeling after radiation completion but this has resolved. No swelling of left arm or difficulty with ROM.  Denies headaches, fever, chills, cough, SOB, chest pain, n/v/c/d or bony pain.  ER- so no adjuvant endocrine therapy.     Review of Systems:    Review of Systems   All other systems reviewed and are negative.    Performance Status:   The Karnofsky performance scale today is 90, Able to carry on normal activity; minor signs or symptoms of disease (ECOG equivalent 0).      OBJECTIVE    Current Outpatient Medications:     acetaminophen (Tylenol) 325 mg tablet, Take 3 tablets (975 mg) by mouth every 6 hours if needed for mild pain (1 - 3) for up to 90 doses., Disp: 90 tablet, Rfl: 0    amoxicillin (Amoxil) 500 mg tablet, Take 4 tablets (2,000 mg) by mouth once daily. 1 hour prior to dental appointments, Disp: , Rfl:     cholecalciferol (Vitamin D-3) 50 mcg (2,000 unit) capsule, Take 1 capsule (50 mcg) by mouth early in the morning.., Disp: , Rfl:     mesalamine (Lialda) 1.2 gram EC tablet, TAKE 4 TABLETS BY MOUTH ONCE DAILY WITH THE EVENING MEAL. (Patient taking differently: Take 4 tablets (4.8 g) by mouth once daily in the evening. Take with meals. Takes 2 in AM and 2 in PM), Disp: 360 tablet, Rfl: 2    mesalamine (Lialda) 1.2 gram EC tablet, Take 4 tablets (4.8 g) by mouth once daily in the evening. Take with meals. (Patient not taking: Reported on 5/1/2024), Disp: 360 tablet, Rfl: 3    ondansetron (Zofran) 4 mg tablet, Take 1 tablet (4 mg) by mouth every 6 hours if needed for nausea for up to 20 doses., Disp: 10 tablet, Rfl: 0    polyethylene glycol (Glycolax, Miralax) 17 gram packet, Take 17 g by mouth once daily as needed (for constipation) for up to 240 doses., Disp: 20 packet, Rfl: 11    propranolol LA (Inderal LA) 80 mg 24 hr capsule, TAKE 1 CAPSULE (80 MG) BY MOUTH ONCE DAILY. DO NOT CRUSH, CHEW, OR SPLIT., Disp: 90 capsule, Rfl: 1    rosuvastatin (Crestor) 20 mg tablet, Take 1 tablet (20 mg)  by mouth once daily., Disp: 90 tablet, Rfl: 1    traMADol (Ultram) 50 mg tablet, Take 1 tablet (50 mg) by mouth every 6 hours if needed for severe pain (7 - 10) for up to 12 doses., Disp: 12 tablet, Rfl: 0     Physical Exam  Vitals reviewed.   Constitutional:       Appearance: Normal appearance.   HENT:      Head: Normocephalic and atraumatic.      Nose: Nose normal.      Mouth/Throat:      Mouth: Mucous membranes are moist.      Pharynx: Oropharynx is clear.   Eyes:      Conjunctiva/sclera: Conjunctivae normal.      Pupils: Pupils are equal, round, and reactive to light.   Cardiovascular:      Heart sounds: Normal heart sounds.   Pulmonary:      Effort: Pulmonary effort is normal.      Breath sounds: Normal breath sounds.   Chest:   Breasts:     Right: No swelling, inverted nipple, mass, nipple discharge or skin change.      Left: No swelling, inverted nipple, mass or nipple discharge.          Comments: Mild tanning left breast in radiation field. Skin intact.   Abdominal:      Palpations: Abdomen is soft.   Musculoskeletal:         General: No swelling. Normal range of motion.      Cervical back: Normal range of motion and neck supple.   Lymphadenopathy:      Cervical: No cervical adenopathy.      Upper Body:      Right upper body: No supraclavicular or axillary adenopathy.      Left upper body: No supraclavicular or axillary adenopathy.   Skin:     General: Skin is warm and dry.   Neurological:      General: No focal deficit present.      Mental Status: She is alert and oriented to person, place, and time.   Psychiatric:         Mood and Affect: Mood normal.         Behavior: Behavior normal.         ASSESSMENT/PLAN:  59 y.o. female with DCIS left breast s/p breast conserving surgery followed by radiation.  Reviewed skin care, possible late effects of treatment, follow up plan.  She will return for radiation follow up in 6 mo.  FUV with Dr. Banda in August.  Follow up with Dr. Eckert for endometrial cancer. Call  with any questions or concerns.     Padmini Sultana CNP  [phone]

## 2024-06-04 ENCOUNTER — TELEPHONE (OUTPATIENT)
Dept: GYNECOLOGIC ONCOLOGY | Facility: HOSPITAL | Age: 59
End: 2024-06-04
Payer: COMMERCIAL

## 2024-06-04 NOTE — TELEPHONE ENCOUNTER
Patient called to ask when she can resume riding stationary bike for exercise.   S/p TLH/BSO on 5/17/24.    Postoperative activity restrictions reviewed with patient.    During call, patient reports mild vaginal odor, small amount of pink tinged discharge seen on tissue following  a void x 2, and intermittent vaginal itching.     Denies dysuria, urinary urgency/frequency.   Denies pelvic pain, fever.    Message routed to Sravanthi Miller CNP    16:06   Phoned patient to notify to continue to monitor symptoms and if vaginal drainage increases, or develops worsening vaginal odor or itching to call office back for sooner office visit.

## 2024-06-07 ENCOUNTER — DOCUMENTATION (OUTPATIENT)
Dept: GENETICS | Facility: CLINIC | Age: 59
End: 2024-06-07
Payer: COMMERCIAL

## 2024-06-07 LAB
LAB AP BLOCK FOR ADDITIONAL STUDIES: NORMAL
LABORATORY COMMENT REPORT: NORMAL
PATH REPORT.FINAL DX SPEC: NORMAL
PATH REPORT.GROSS SPEC: NORMAL
PATH REPORT.RELEVANT HX SPEC: NORMAL
PATH REPORT.TOTAL CANCER: NORMAL
PATHOLOGY SYNOPTIC REPORT: NORMAL
SCAN RESULT: NORMAL

## 2024-06-07 NOTE — PROGRESS NOTES
"Cancer Genetics Chart Update (telephone results note):  Spoke with Heidi Quiroz by phone today to review her recent hereditary cancer genetic test results. Ms. 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 Ms. Quiroz  should have access to her genetic test results in her [facility], but she was also provided with an electronic copy of her results via [employer]' online secure portal    Family history:  A 4-generation pedigree was obtained and was significant for the following:  -Patient with ER- DCIS at age 58 and mismatch repair proficient endometrial cancer at age 59, per the above;  -Brother #1, lung cancer in his 60s, smoker,  at 63;  -Brother #2, lung cancer at 65, smoker,  at 65;  -Father, \"liver cancer,\"  at age 60;              -Father was an only child;  -Mother, lung cancer in her 70s, smoker,  at 74;              -Smaller maternal family. Mother had only one sibling (sister) who  in her late 20s/early 30s due to pregnancy complications.     Ms. Quiroz is of [employer] (paternal) and [place] [employer] (maternal) ancestry. Her parents are reportedly \"second cousins,\" but she is not sure of the exact relationship.    Genetic test reults:  BRCA1/2 Analyses with [employer]® +[employer]insight®    RESULTS  MUTYH Variant, Unknown Significance: p.R191Q    SUMMARY  Variant of Unknown Significance Detected    INTERPRETATION  No known clinically actionable alterations were detected.  One variant of unknown significance was detected in the MUTYH gene.    Risk Estimate: should be based on clinical and family history, as the clinical significance of this result is unknown. Genetic counseling is a recommended option for all individuals undergoing genetic testing. 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 Refer to the supplementary pages for additional information on this variant. No additional pathogenic mutations, variants of unknown significance, or gross deletions or duplications were detected.    Genes Analyzed (35 total): APC, TAMMIE, BARD1, BMPR1A, BRCA1, BRCA2, BRIP1, CDH1, CDK4, CDKN2A, CHEK2, DICER1, MLH1, MSH2, MSH6, MUTYH, NF1, NTHL1, PALB2, PMS2, PTEN, RAD51C, RAD51D, SMAD4, SMARCA4, STK11 and TP53 (sequencing and deletion/duplication); AXIN2, EGFR, HOXB13, MSH3, POLD1 and POLE (sequencing only); EPCAM and GREM1 (deletion/duplication only). RNA data is routinely analyzed for use in variant interpretation for all genes.    Results summarized at the bottom of this note as well as attached to this encounter.    Genetic counseling:  Ms. Quiroz's results showed only a single variant of uncertain significance (VUS) in the MUTYH gene.  These are essentially negative results, meaning that no disease causing mutations were identified. A genetic cause for Ms. Quiroz's personal and/or family history of cancer has not been identified.    A VUS is a change in the gene from the typical expected result that is not known if it is (a) harmful and associated with increased cancer risk, or (b) benign and not associated with increased cancer risk. Over time as new knowledge is gained, a genetic testing laboratory may be able to reclassify the VUS as either benign or pathogenic.    The reclassification process does not usually happen very quickly, and the process sometimes takes years. More often than not, a VUS is reclassified as benign (or likely benign), but sometimes a VUS is reclassified as pathogenic. A pathogenic gene change is one that could have significant implications for the original patient tested, as well as for their family members. At this time, since this gene change is a  VUS, this means that we cannot make any medical management recommendations based on it. We must use Ms. Quiroz's personal and family history to guide their care and the care of their relatives. Ms. Quiroz should follow-up with Genetics in 1-2 years to see if the VUS has been reclassified.    One reason Ms. Quiroz underwent genetic testing was to better understand her risk to develop a second breast cancer to help determine if further breast screening or surgery is indicated. Because their results were negative, Ms. Quiroz does not have an identifiable genetic risk factor that places her at an increased risk to develop a second breast cancer. She should continue being followed by her breast cancer care team, as well as her gynecologic oncology team.    We also discussed that Ms. Quiroz should follow her primary care providers' recommendations for all other age-related cancer screenings.    A brief family history of Ms. Quiroz's son  was obtained. She shared that his father had melanoma, but she is not aware of any other family history of cancer in his and family.  This family history of cancer is not really suggestive of hereditary cancer, therefore no further genetic testing is recommended for her son. However, we did discuss the importance of skin cancer prevention, including using sunscreen and consideration of regular ongoing dermatologic exams for her son given his father's history of melanoma.    Our understanding of genetic contribution to cancer diagnoses is always evolving, so there may be additional testing recommended in the future. Ms. Quiroz was asked to keep us apprised as to any changes to their personal and/or family history of cancer, and to follow-up with Genetics every 1-2 years to check on the status of the variant uncertain significance (VUS), as well as to determine if there have been any changes since our discussion today.    Ludy Serna MS, [facility]or  [facility]  [phone]    Reviewed by:  Jayashree Sage MD  Clinical   [facility]  [phone]

## 2024-06-12 NOTE — PROGRESS NOTES
"Patient ID: Chelita Quiroz is a 59 y.o. female.  Referring Physician: No referring provider defined for this encounter.  Primary Care Provider: Holden Hare DO      Subjective    HPI  HPI:   Heidi \"Chelita\"Gabriella is a 59 y.o. Fm with a PMHx notable for left breast DCIS (s/p partial mastectomy and currently undergoing radiation) now with endometrial cancer    Interval History:  The patient notes some spotting post operatively, ongoing fatigue, otherwsie she is doing well.    They deny fever, chills, constipation, diarrhea, vaginal bleeding, nausea, vomiting, or any other symptoms other than those listed in the interval history.    PMH:  - recent diagnosis of left breast DCIS, grade 3, ER negative s/p partial mastectomy and undergoing radiation- states that her last dose of radiation is next week  - GERD  - hypothyroidism I/s/o multinodular goiter   - HTN  - cholelithiasis  - hearing loss  - HLD  - Ulcerative colitis   - Endometrial cancer     PSH:  - left breast partial mastectomy (2/15/2024, with reexcision on )  - L total hip arthroplasty  - D&C  - TLH/BSO/SLN    OBHx:  The patient is a . She underwent menopause at age 49. She used COCs for 0 years (states she took progesterone in her teens for 1-2 months). She did not use HRT. She underwent menarche at age 12    Social:  They deny recreational drug use. Endorses occasional alcohol use. History of tobacco use with cigarettes 0.5 ppd for 8 years. The patient lives at home with alone. The patient works at home.     FamHx:  - Mother: lung cancer at 72  - Father: liver cancer at 62  - Brother: lung cancer at 63  - Brother: lung cancer at 66  Their history is otherwise negative for a history of breast, ovarian, uterine, colon, pancreatic, and GI cancer.     Screening:  Cervical cancer: 10/3/2022: negative, HPV high risk negative   Mammogram:  24: post procedure mammogram   Colonoscopy: 2022: ulcerative colitis, follow up in 5-7 years     Review of " Systems - Oncology     Objective   BSA: 2.05 meters squared  /76 (BP Location: Left arm, Patient Position: Sitting, BP Cuff Size: Adult)   Pulse 64   Temp 36.1 °C (97 °F) (Core)   Resp 18   Wt 94.2 kg (207 lb 10.8 oz)   LMP  (LMP Unknown)   SpO2 94%   BMI 36.79 kg/m²      Family History   Problem Relation Name Age of Onset    Heart attack Mother      Lung cancer Mother      Hypertension Father      Liver cancer Father      Hypertension Brother      Lung cancer Brother      Other (psoriatic arthritis) Son         Heidi Quiroz  reports that she quit smoking about 29 years ago. Her smoking use included cigarettes. She started smoking about 36 years ago. She has a 3.5 pack-year smoking history. She has been exposed to tobacco smoke. She has never used smokeless tobacco.  She  reports current alcohol use.  She  reports no history of drug use.    Physical Exam  Constitutional:       General: She is not in acute distress.     Appearance: Normal appearance. She is not toxic-appearing.   HENT:      Head: Normocephalic.      Mouth/Throat:      Mouth: Mucous membranes are moist.      Pharynx: Oropharynx is clear.   Eyes:      Extraocular Movements: Extraocular movements intact.      Conjunctiva/sclera: Conjunctivae normal.      Pupils: Pupils are equal, round, and reactive to light.   Cardiovascular:      Rate and Rhythm: Normal rate and regular rhythm.      Heart sounds: Normal heart sounds. No murmur heard.     No friction rub. No gallop.   Pulmonary:      Effort: Pulmonary effort is normal.      Breath sounds: Normal breath sounds. No wheezing or rhonchi.   Abdominal:      General: Bowel sounds are normal. There is no distension.      Palpations: Abdomen is soft.      Tenderness: There is no abdominal tenderness.      Comments: Well healed laparoscopic incision sites    Musculoskeletal:         General: Normal range of motion.      Cervical back: Normal range of motion.   Skin:     General: Skin is warm.    Neurological:      General: No focal deficit present.      Mental Status: She is alert and oriented to person, place, and time.   Psychiatric:         Mood and Affect: Mood normal.         Behavior: Behavior normal.       Surgical Pathology Exam: [id number]  Order: [id number]   Collected 5/17/2024 09:49       Status: Final result       Visible to patient: Yes (seen)       Dx: Endometrial cancer determined by uter...    0 Result Notes      Component    FINAL DIAGNOSIS   A.  RIGHT PELVIC SENTINEL LYMPH NODE, LYMPHADENECTOMY:   -- Adipose tissue with no pathologic diagnosis; lymphatic parenchyma is not sampled     B.  LEFT PELVIC SENTINEL LYMPH NODE, LYMPHADENECTOMY:   -- Four lymph nodes with no evidence of metastases (0/4)     C. UTERUS, CERVIX, BILATERAL FALLOPIAN TUBES AND OVARIES, HYSTERECTOMY AND BILATERAL SALPINGO-OOPHORECTOMY:   -- Endometrial carcinoma, endometrioid type, FIGO grade 1, with 46 % myometrial invasion, see cancer summary report   -- Myometrium with leiomyomata  -- Cervix with no pathologic diagnosis  -- Left ovary with no pathologic diagnosis; right ovary with focal endometriosis  -- Bilateral fallopian tubes with no pathologic diagnosis      Electronically signed by Susi Lemos MD on 6/7/2024 at 1248        By the signature on this report, the individual or group listed as making the Final Interpretation/Diagnosis certifies that they have reviewed this case.    Case Summary Report   ENDOMETRIUM   8th Edition - Protocol posted: 12/14/2022ENDOMETRIUM - All Specimens  SPECIMEN   Procedure  Total hysterectomy and bilateral salpingo-oophorectomy   TUMOR   Tumor Site  Endometrium   Histologic Type  Endometrioid carcinoma, NOS   Histologic Grade  FIGO grade 1   Two-Tier Grading System  Low grade (encompassing FIGO 1 and 2)   Myometrial Invasion  Present   Depth of Myometrial Invasion  5.5 mm   Myometrial Thickness  12 mm   Percentage of Myometrial Invasion  46 %   Uterine Serosa Involvement  Not  identified   Lower Uterine Segment Involvement  Not identified   Cervical Stromal Involvement  Not identified   Other Tissue / Organ Involvement  Not identified   Peritoneal / Ascitic Fluid  Not submitted / unknown   Lymphatic and / or Vascular Invasion  Not identified   REGIONAL LYMPH NODES   Regional Lymph Node Status  All regional lymph nodes negative for tumor cells   Lymph Nodes Examined     Total Number of Pelvic Nodes Examined  4   Number of Pelvic Gustine Nodes Examined  4   Total Number of Para-aortic Nodes Examined  0   pTNM CLASSIFICATION (AJCC 8th Edition)   Reporting of pT, pN, and (when applicable) pM categories is based on information available to the pathologist at the time the report is issued. As per the AJCC (Chapter 1, 8th Ed.) it is the managing physician’s responsibility to establish the final pathologic stage based upon all pertinent information, including but potentially not limited to this pathology report.   pT Category  pT1a   pN Category  pN0   N Suffix  (sn)   FIGO STAGE   FIGO Stage  IA   Comment(s)  Immunohistochemistry performed on a prior endometrial biopsy demonstrated normal/intact expression of all 4 mismatch repair proteins and p53 wild-type expression pattern.             Performance Status:  Asymptomatic    Assessment/Plan      Oncology History Overview Note   - 5/17/23: TLH/BSO/SLN with stage IA grade 1 endometrioid endometrial adenocarcinoma MMRp, p53wt), no LVSI      Ductal carcinoma in situ (DCIS) of left breast   1/31/2024 Initial Diagnosis    Ductal carcinoma in situ (DCIS) of left breast     3/6/2024 Cancer Staged    Staging form: Breast, AJCC 8th Edition, Clinical stage from 3/6/2024: cTis (DCIS), cM0, G3, ER- - Signed by Samia Banda MD on 3/6/2024     3/6/2024 Cancer Staged    Staging form: Breast, AJCC 8th Edition, Pathologic stage from 3/6/2024: Stage 0 (pTis (DCIS), pN0, cM0, G3, ER-) - Signed by Samia Banda MD on 3/6/2024     Endometrial cancer determined by  "uterine biopsy (Multi)   4/25/2024 Initial Diagnosis    Endometrial cancer determined by uterine biopsy (Multi)     5/17/2024 Cancer Staged    Staging form: Corpus Uteri - Carcinoma and Carcinosarcoma, AJCC 8th Edition, Clinical stage from 5/17/2024: FIGO Stage IA (cT1a, cN0(sn), cM0) - Signed by Anahy Eckert MD on 6/12/2024         Heidi \"Chelita\" Gabriella is a 59 y.o. Fm with a PMHx notable for left breast DCIS (s/p partial mastectomy and currently undergoing radiation) s/p TLH/BSO/SLN in 5/2024 with stage IA grade 1  endometrial adenocarcinoma endometrioid type (p53-wt, MMR-p, no LVSI) presenting for post-op    # Endometrial cancer  - Discussed the significance of histologic subtype, grade and stage  - Discussed management options including medical, non-surgical, and surgical options.   - We reviewed her pathology and my recommendation for surveillance  - We discussed surveillance  - We reviewed signs and symptoms of recurrence  - Plan for surveillance visit in 4 months     # Post-op  - Recovering well  - Discussed ongoing restrictions (no lifting more than 10-15lbs, nothing per vagina, no soaking)  - Restrictions for another 3 weeks    Scribe Attestation  By signing my name below, I, Isaac Graves   attest that this documentation has been prepared under the direction and in the presence of Anahy Eckert MD.     Provider Attestation - Scribe documentation    All medical record entries made by the Scribe were at my direction and personally dictated by me. I have reviewed the chart and agree that the record accurately reflects my personal performance of the history, physical exam, discussion and plan.    Anahy Eckert MD    "

## 2024-06-13 ENCOUNTER — OFFICE VISIT (OUTPATIENT)
Dept: GYNECOLOGIC ONCOLOGY | Facility: CLINIC | Age: 59
End: 2024-06-13
Payer: COMMERCIAL

## 2024-06-13 VITALS
DIASTOLIC BLOOD PRESSURE: 76 MMHG | OXYGEN SATURATION: 94 % | TEMPERATURE: 97 F | SYSTOLIC BLOOD PRESSURE: 118 MMHG | BODY MASS INDEX: 36.79 KG/M2 | RESPIRATION RATE: 18 BRPM | HEART RATE: 64 BPM | WEIGHT: 207.67 LBS

## 2024-06-13 DIAGNOSIS — C54.1 ENDOMETRIAL CANCER DETERMINED BY UTERINE BIOPSY (MULTI): Primary | ICD-10-CM

## 2024-06-13 DIAGNOSIS — Z71.9 ENCOUNTER FOR COUNSELING: ICD-10-CM

## 2024-06-13 PROCEDURE — 3074F SYST BP LT 130 MM HG: CPT | Performed by: STUDENT IN AN ORGANIZED HEALTH CARE EDUCATION/TRAINING PROGRAM

## 2024-06-13 PROCEDURE — 99024 POSTOP FOLLOW-UP VISIT: CPT | Performed by: STUDENT IN AN ORGANIZED HEALTH CARE EDUCATION/TRAINING PROGRAM

## 2024-06-13 PROCEDURE — 1036F TOBACCO NON-USER: CPT | Performed by: STUDENT IN AN ORGANIZED HEALTH CARE EDUCATION/TRAINING PROGRAM

## 2024-06-13 PROCEDURE — 3078F DIAST BP <80 MM HG: CPT | Performed by: STUDENT IN AN ORGANIZED HEALTH CARE EDUCATION/TRAINING PROGRAM

## 2024-06-13 ASSESSMENT — PAIN SCALES - GENERAL: PAINLEVEL: 0-NO PAIN

## 2024-06-14 ENCOUNTER — OFFICE VISIT (OUTPATIENT)
Dept: GASTROENTEROLOGY | Facility: CLINIC | Age: 59
End: 2024-06-14
Payer: COMMERCIAL

## 2024-06-14 VITALS
TEMPERATURE: 97.8 F | DIASTOLIC BLOOD PRESSURE: 73 MMHG | BODY MASS INDEX: 35.61 KG/M2 | HEART RATE: 76 BPM | SYSTOLIC BLOOD PRESSURE: 130 MMHG | WEIGHT: 201 LBS | HEIGHT: 63 IN

## 2024-06-14 DIAGNOSIS — D64.9 ANEMIA, UNSPECIFIED TYPE: ICD-10-CM

## 2024-06-14 DIAGNOSIS — K51.00 ULCERATIVE PANCOLITIS WITHOUT COMPLICATION (MULTI): Primary | ICD-10-CM

## 2024-06-14 PROCEDURE — 3078F DIAST BP <80 MM HG: CPT | Performed by: NURSE PRACTITIONER

## 2024-06-14 PROCEDURE — 99213 OFFICE O/P EST LOW 20 MIN: CPT | Performed by: NURSE PRACTITIONER

## 2024-06-14 PROCEDURE — 3075F SYST BP GE 130 - 139MM HG: CPT | Performed by: NURSE PRACTITIONER

## 2024-06-14 NOTE — PROGRESS NOTES
Subjective   Patient ID: Chelita Quiroz is a 59 y.o. female.    HPI  59-year-old female for follow-up of ulcerative colitis  Previously seen 6/13/2023  She is currently on mesalamine 4.8 g  New diagnosis of breast cancer since last visit and endometrial cancer- lumpectomy DCIS- radiation, total hysterectomy and SBO laparoscopic 5/17/24 4/19/2022 colonoscopy showed a normal ileum, altered vascular and erythematous inflamed and ulcerated mucosa in sigmoid, descending, transverse, hepatic flexure and ascending colon  Pathology showed right and left colon focal active colitis, patchy cryptitis noted  Terminal ileum and rectum showed no significant path  Labs reviewed 5/7/2024  Normal CMP  Sed rate 40  CRP 1.05  Iron 59  H&H 14.3 and 45.8  Negative C. Difficile  Fecal juan f: 33  Feeling tired  Loose stools - using metamucil and has helped some  Water- drinking  [facility] specialty pharmacist    Objective   Physical Exam  Cardiovascular:      Rate and Rhythm: Normal rate and regular rhythm.      Pulses: Normal pulses.      Heart sounds: Normal heart sounds.   Pulmonary:      Effort: Pulmonary effort is normal.      Breath sounds: Normal breath sounds.   Abdominal:      General: Bowel sounds are normal.      Palpations: Abdomen is soft.         Assessment/Plan     Ulcerative colitis- I would recommend continuing the mesalamine 2 tables in am and 2 in the pm.    Anemia- this may be due surgeries you have had recently. I would recommend repeating your blood work in July.    I will call you with your results and determine follow up for the lab work. I would like to see you back for routine follow up in 6 months.

## 2024-06-14 NOTE — PATIENT INSTRUCTIONS
Ulcerative colitis- I would recommend continuing the mesalamine 2 tables in am and 2 in the pm.    Anemia- this may be due surgeries you have had recently. I would recommend repeating your blood work in July.    I will call you with your results and determine follow up for the lab work. I would like to see you back for routine follow up in 6 months.

## 2024-06-18 ENCOUNTER — APPOINTMENT (OUTPATIENT)
Dept: PRIMARY CARE | Facility: CLINIC | Age: 59
End: 2024-06-18
Payer: COMMERCIAL

## 2024-06-18 ENCOUNTER — PATIENT MESSAGE (OUTPATIENT)
Dept: PRIMARY CARE | Facility: CLINIC | Age: 59
End: 2024-06-18

## 2024-06-18 VITALS — SYSTOLIC BLOOD PRESSURE: 139 MMHG | DIASTOLIC BLOOD PRESSURE: 73 MMHG

## 2024-06-18 DIAGNOSIS — E78.5 DYSLIPIDEMIA: Primary | ICD-10-CM

## 2024-06-18 DIAGNOSIS — I10 PRIMARY HYPERTENSION: ICD-10-CM

## 2024-06-18 PROCEDURE — 99213 OFFICE O/P EST LOW 20 MIN: CPT | Performed by: INTERNAL MEDICINE

## 2024-06-18 PROCEDURE — 3075F SYST BP GE 130 - 139MM HG: CPT | Performed by: INTERNAL MEDICINE

## 2024-06-18 PROCEDURE — 3078F DIAST BP <80 MM HG: CPT | Performed by: INTERNAL MEDICINE

## 2024-06-18 PROCEDURE — 1036F TOBACCO NON-USER: CPT | Performed by: INTERNAL MEDICINE

## 2024-06-18 RX ORDER — ROSUVASTATIN CALCIUM 10 MG/1
10 TABLET, COATED ORAL DAILY
Qty: 90 TABLET | Refills: 3 | Status: SHIPPED | OUTPATIENT
Start: 2024-06-18 | End: 2024-06-19 | Stop reason: SDUPTHER

## 2024-06-18 RX ORDER — EZETIMIBE 10 MG/1
10 TABLET ORAL DAILY
Qty: 90 TABLET | Refills: 3 | Status: SHIPPED | OUTPATIENT
Start: 2024-06-18 | End: 2025-06-13

## 2024-06-18 NOTE — PROGRESS NOTES
Subjective   Patient ID: Chelita Quiroz is a 59 y.o. female who presents for No chief complaint on file..        HPI   Patient is a 58-year-old female with past medical history of chronic GERD hypothyroidism hypertension cholelithiasis and hearing loss, dyslipidemia who presents for follow-up    Last visit patient had lab work showing control of the LDL however there were elevated triglycerides.  Rosuvastatin was increased to 20 mg.  Most recent LDL showing slight increase relative to prior but no change in the triglycerides.  No significant weight loss.  Blood pressure is slightly higher than it was previously but patient reports it has been better at home.  She also reports side effects after increasing the statin including restless nights myalgias and fatigue            Review of Systems  Constitutional: No fever or chills, No Night Sweats  Eyes: No Blurry Vision or Eye sight problems  ENT: No Nasal Discharge, Hoarseness, sore throat  Cardiovascular: no chest pain, no palpitations and no syncope.   Respiratory: no cough, no shortness of breath during exertion and no shortness of breath at rest.   Gastrointestinal: no abdominal pain, no nausea and no vomiting.   : No vaginal discharge, burning with urination, no blood in urine or stools  Skin: No Skin rashes or Lesions  Neuro: No Headache, no dizziness or Numbness or tingling  Psych: No Anxiety, depression or sleeping problems  Heme: No Easy bleeding or brusing.     Objective   /73   LMP  (LMP Unknown)     Physical Exam  Patient declined Chaperone  Constitutional: Alert and in no acute distress. Well developed, well nourished.   Head and Face: Head and face: Normal.    Eyes: Normal external exam. Pupils were equal in size, round, reactive to light (PERRL) with normal accommodation and extraocular movements intact (EOMI).   Ears, Nose, Mouth, and Throat: External inspection of ears and nose: Normal.  Hearing: Normal.  Nasal mucosa, septum, and turbinates:  Normal.  Lips, teeth, and gums: Normal.  Oropharynx: Normal.   Neck: No neck mass was observed. Supple. Thyroid not enlarged and there were no palpable thyroid nodules.   Cardiovascular: Heart rate and rhythm were normal, normal S1 and S2. Pedal pulses: Normal. No peripheral edema.   Pulmonary: No respiratory distress. Clear bilateral breath sounds.   Breast: Normal Appearance, No Masses or lumps palpated  Abdomen: Soft nontender; no abdominal mass palpated. Normal bowel sounds. No organomegaly.   : Deferred   Musculoskeletal: No joint swelling seen, normal movements of all extremities. Range of motion: Normal.  Muscle strength/tone: Normal.    Skin: Normal skin color and pigmentation, normal skin turgor, and no rash.   Neurologic: Deep tendon reflexes were 2+ and symmetric.   Psychiatric: Judgment and insight: Intact. Mood and affect: Normal.  Lymphatic: No cervical lymphadenopathy. Palpation of lymph nodes in axillae: Normal.  Palpation of lymph nodes in groin: Normal.    Lab Results   Component Value Date    WBC 6.6 05/07/2024    HGB 14.3 05/07/2024    HCT 45.8 05/07/2024     05/07/2024    CHOL 176 05/07/2024    TRIG 225 (H) 05/07/2024    HDL 47.1 05/07/2024    ALT 20 05/07/2024    AST 17 05/07/2024     05/07/2024    K 4.2 05/07/2024     05/07/2024    CREATININE 0.75 05/07/2024    BUN 16 05/07/2024    CO2 28 05/07/2024    TSH 1.12 02/12/2024    HGBA1C 5.4 02/08/2021       Rad Onc CT Sim Images  These images are not reportable by radiology and will not be interpreted   by  Radiologists.      Assessment/Plan   Problem List Items Addressed This Visit       Hypertension     Considering blood pressure is consistently less than 130 systolic at home will hold off on adjusting medications and recheck next visit         Relevant Medications    rosuvastatin (Crestor) 10 mg tablet    Other Relevant Orders    Follow Up In [facility] - PCP - Established    Dyslipidemia - Primary      Considering no changes in triglycerides with increase of Crestor as well as side effects to the higher dose of Crestor we will decrease the Crestor to 10 mg and add Zetia.  Recheck LDL 6 months.  Advised Mediterranean diet         Relevant Medications    ezetimibe (Zetia) 10 mg tablet    Other Relevant Orders    Follow Up In [facility] - PCP - Established

## 2024-06-18 NOTE — ASSESSMENT & PLAN NOTE
Considering no changes in triglycerides with increase of Crestor as well as side effects to the higher dose of Crestor we will decrease the Crestor to 10 mg and add Zetia.  Recheck LDL 6 months.  Advised Mediterranean diet

## 2024-06-18 NOTE — ASSESSMENT & PLAN NOTE
Considering blood pressure is consistently less than 130 systolic at home will hold off on adjusting medications and recheck next visit

## 2024-06-19 DIAGNOSIS — I10 PRIMARY HYPERTENSION: ICD-10-CM

## 2024-06-19 RX ORDER — ROSUVASTATIN CALCIUM 10 MG/1
10 TABLET, COATED ORAL DAILY
Qty: 90 TABLET | Refills: 3 | Status: SHIPPED | OUTPATIENT
Start: 2024-06-19

## 2024-06-21 ENCOUNTER — TELEPHONE (OUTPATIENT)
Dept: GYNECOLOGIC ONCOLOGY | Facility: HOSPITAL | Age: 59
End: 2024-06-21
Payer: COMMERCIAL

## 2024-06-21 NOTE — TELEPHONE ENCOUNTER
Patient's short-term disability paper work faxed to [facility]. Patient notified via [employer]

## 2024-08-08 NOTE — PROGRESS NOTES
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  2) 2022.  Bilateral mammogram.  Scattered fibroglandular tissue bilaterally.  BI-RADS 1.  3) 2023.  Scattered fibroglandular tissue.  Right breast negative.  Left breast upper outer calcifications.  Left breast asymmetry, BI-RADS 0.  4) 2023.  Left breast diagnostic imaging.  Left breast indeterminate calcifications are confirmed.  Left breast asymmetry resolves.  Targeted ultrasound was performed.  No suspicious findings.  BI-RADS 4.  Left breast stereotactic biopsy of calcifications is recommended.  5) 2024.Left breast calcifications biopsy.  High-grade DCIS, ER negative.  Dumbbell Tri Jesse clip is in good position.  Calcifications span approximately 1 cm on mammogram.  6) 2/15/2024. Left PM 3.6 cm grade 3 DCIS, close anterior, lateral, posterior margins pTis  7) 2024 left re-excision no residual disease  8) completed XRT  9) 2024 TLH/BSO/SLNB for stage  1 A grade1 endometrial adenocarcinoma  10) genetic testing with [facility] in [place]    She presents today for FU  No breast concerns    Ob/gyn history:  Menarche 12  Menopause 49  , age of first delivery 29  no OCPs, no fertility treatments, no HRT    Mother with lung cancer at 72  Father with liver cancer at 62  Brother with lung cancer at 63  Brother with lung cancer at 66    Review of systems  A comprehensive ROS was taken on the patient intake form and reviewed with the patient. This form is scanned into the electronic medical record.    Constitutional symptoms: Denies generalized fatigue. Denies weight change, fevers/chills, difficulty sleeping   Eyes: Denies double vision, glaucoma, cataracts.  Ear/nose/throat/mouth: Denies hearing changes,  sore throat, sinus problems.  Cardiovascular: No chest pain. Denies irregular heartbeat. Denies ankle swelling.  Respiratory: No wheezing, cough, or shortness of breath.  Gastrointestinal: No abdominal pain, No nausea/vomiting. No indigestion/heartburn. No change in bowel habits. No constipation or diarrhea.   Genitourinary: No urinary incontinence. No urinary frequency. No painful urination.  Musculoskeletal: No bone pain, no muscle pain, no joint pain.   Integumentary: No rash. No masses. No changes in moles. No easy bruising.  Neurological: No headaches. No tremors. No numbness/tingling.  Psychiatric: No anxiety. No depression.  Endocrine: No excessive thirst. Not too hot or too cold. Not tired or fatigued.   Hematological/lymphatic: No swollen glands or blood clotting problems. No bruising.     Physical exam  A chaperone was offered for all portions of the physical exam. The patient declined.     General appearance: appears stated age, alert and oriented x 3  Head: Normocephalic, atraumatic  Eyes: conjunctivae/corneas clear.  Ears: External ears are normal, hearing is grossly intact.  Lungs: normal breathing  Heart: regular   Abdomen: Soft, nontender, nondistended.  Neurologic: grossly normal  Lymph nodes: No cervical, supraclavicular or axillary lymphadenopathy bilaterally    Breast: A comprehensive breast exam was performed in the seated and supine positions. Breasts are symmetrical. Bilateral nipples are everted. There are no skin changes on arm maneuvers. Bra size: 44D   Right: No masses   Left: healed circumareolar incision. Radiation changes.     Results  Imaging  All breast imaging personally reviewed by me.  See HPI    Pathology  January 22, 2024.Left breast calcifications biopsy.  High-grade DCIS, ER negative.  Dumbbell Tri Jesse clip is in good position.  Calcifications span approximately 1 cm on mammogram.    Impression:   1) 59 yo Ashkenazi Spiritism,  female here with New left breast DCIS, ER  negative.  Now s/p left PM, 3.6 cm grade 3 DCIS close margins.   Now s/p left PM re-excision, no residual disease  pTis  Completed XRT  2) Co-morbidities include hypertension, hyperlipidemia.. Non-smoker  3) No family history of breast or ovarian cancer.  [facility]      Plan:   She is here alone today.  She completed radiation.  She underwent a hysterectomy for endometrial cancer.  She is followed by [facility].  She has no breast concerns.  Clinical exam is normal.  We discussed follow-up.  Bilateral mammogram is due in January.  She can follow-up with the nurse practitioner after her mammogram.  She should call with any sooner concerns.

## 2024-08-12 ENCOUNTER — OFFICE VISIT (OUTPATIENT)
Dept: SURGICAL ONCOLOGY | Facility: CLINIC | Age: 59
End: 2024-08-12
Payer: COMMERCIAL

## 2024-08-12 VITALS
BODY MASS INDEX: 36.85 KG/M2 | SYSTOLIC BLOOD PRESSURE: 134 MMHG | WEIGHT: 208 LBS | HEART RATE: 60 BPM | DIASTOLIC BLOOD PRESSURE: 80 MMHG

## 2024-08-12 DIAGNOSIS — D05.12 DUCTAL CARCINOMA IN SITU (DCIS) OF LEFT BREAST: Primary | ICD-10-CM

## 2024-08-12 DIAGNOSIS — Z85.3 PERSONAL HISTORY OF BREAST CANCER: ICD-10-CM

## 2024-08-12 PROCEDURE — 99214 OFFICE O/P EST MOD 30 MIN: CPT | Performed by: SURGERY

## 2024-08-12 PROCEDURE — 3075F SYST BP GE 130 - 139MM HG: CPT | Performed by: SURGERY

## 2024-08-12 PROCEDURE — 3079F DIAST BP 80-89 MM HG: CPT | Performed by: SURGERY

## 2024-08-12 ASSESSMENT — PAIN SCALES - GENERAL: PAINLEVEL: 0-NO PAIN

## 2024-08-20 DIAGNOSIS — Z00.00 ENCOUNTER FOR GENERAL ADULT MEDICAL EXAMINATION WITHOUT ABNORMAL FINDINGS: ICD-10-CM

## 2024-08-20 DIAGNOSIS — I10 PRIMARY HYPERTENSION: ICD-10-CM

## 2024-08-20 RX ORDER — PROPRANOLOL HYDROCHLORIDE 80 MG/1
80 CAPSULE, EXTENDED RELEASE ORAL DAILY
Qty: 90 CAPSULE | Refills: 1 | Status: SHIPPED | OUTPATIENT
Start: 2024-08-20

## 2024-10-07 NOTE — PROGRESS NOTES
"Heidi Quiroz female   1965 59 y.o.   [id number]      Chief Complaint  Left breast DCIS    History Of Present Illness  Heidi Quiroz \"Yasmeen" is a 59 y.o. female presenting with breast cancer surveillance and mammogram. On 2024 she underwent a left breast core biopsy which revealed ductal carcinoma in situ, grade 3. Estrogen receptors were negative. She underwent a left partial mastectomy on 2/15/2024. Pathology revealed a 3.6 cm ductal carcinoma in situ of the cribriform, micropapillary and solid subtypes, nuclear grade 3. The resection margins were negative although DCIS was microns from the anterior margin and less than 1 mm from the lateral and posterior margins.      - She underwent a reexcision on 2024 which was negative. Referred for radiation.      - 4/3/24 - 24: Radiation to the left breast consisting of a dose of 42.56 Gy with additional 10 Gy to the tumor bed for a total of 52.56 Gy.     - 24: Laparoscopic hysterectomy TLH/BSO/SLNB for stage  1 A grade1 endometrial adenocarcinoma     genetic testing with [facility] in [facility]    BREAST IMAGING: 10/10/2024 bilateral diagnostic mammogram, BI-RADS Category 2.     REPRODUCTIVE HISTORY: menarche age 12, , first birth age 29, , no OCP's, menopause age 49, no HRT, scattered fibroglandular tissue.                                   FAMILY CANCER HISTORY:   Mother with lung cancer at 72  Father with liver cancer at 62  Brother with lung cancer at 63  Brother with lung cancer at 66     Surgical History  She has a past surgical history that includes Colonoscopy; Total hip arthroplasty (Left, ); Dilation and curettage of uterus; [place] tooth extraction; Breast lumpectomy (Left, 02/15/2024); and Endometrial biopsy.     Social History  She reports that she quit smoking about 29 years ago. Her smoking use included cigarettes. She started smoking about 36 years ago. She has a 3.5 pack-year smoking history. She has been exposed to " tobacco smoke. She has never used smokeless tobacco. She reports current alcohol use. She reports that she does not use drugs.    Family History  Family History   Problem Relation Name Age of Onset    Heart attack Mother      Lung cancer Mother      Hypertension Father      Liver cancer Father      Hypertension Brother      Lung cancer Brother      Other (psoriatic arthritis) Son          Allergies  Patient has no known allergies.    Medications  Current Outpatient Medications   Medication Instructions    acetaminophen (TYLENOL) 975 mg, oral, Every 6 hours PRN    amoxicillin (AMOXIL) 2,000 mg, oral, Daily, 1 hour prior to dental appointments    cholecalciferol (Vitamin D-3) 50 mcg (2,000 unit) capsule 1 capsule, oral, Daily    ezetimibe (ZETIA) 10 mg, oral, Daily    mesalamine (LIALDA) 4.8 g, oral, Daily with evening meal    ondansetron (ZOFRAN) 4 mg, oral, Every 6 hours PRN    polyethylene glycol (GLYCOLAX, MIRALAX) 17 g, oral, Daily PRN    propranolol LA (INDERAL LA) 80 mg, oral, Daily, Do not crush, chew, or split.    rosuvastatin (CRESTOR) 10 mg, oral, Daily    traMADol (ULTRAM) 50 mg, oral, Every 6 hours PRN         REVIEW OF SYSTEMS    Constitutional:  Negative for appetite change, fatigue, fever and unexpected weight change.   HENT:  Negative for ear pain, hearing loss, nosebleeds, sore throat and trouble swallowing.    Eyes:  Negative for discharge, itching and visual disturbance.   Respiratory:  Negative for cough, chest tightness and shortness of breath.    Cardiovascular:  Negative for chest pain, palpitations and leg swelling.   Breast: as indicated in HPI  Gastrointestinal:  Negative for abdominal pain, constipation, diarrhea and nausea.   Endocrine: Negative for cold intolerance and heat intolerance.   Genitourinary:  Negative for dysuria, frequency, hematuria, pelvic pain and vaginal bleeding.   Musculoskeletal:  Negative for arthralgias, back pain, gait problem, joint swelling and myalgias.   Skin:   Negative for color change and rash.   Allergic/Immunologic: Negative for environmental allergies and food allergies.   Neurological:  Negative for dizziness, tremors, speech difficulty, weakness, numbness and headaches.   Hematological:  Does not bruise/bleed easily.   Psychiatric/Behavioral:  Negative for agitation, dysphoric mood and sleep disturbance. The patient is not nervous/anxious.         Past Medical History  She has a past medical history of Anemia, Arthritis, Cholelithiasis, Chronic pain disorder, Ductal carcinoma in situ (DCIS) of left breast, Endometrial cancer (Multi), Essential (primary) hypertension, GERD (gastroesophageal reflux disease), Hearing loss, Insomnia, Irritable bowel syndrome, Lung nodule, Nontoxic multinodular goiter, PMB (postmenopausal bleeding), Pure hypercholesterolemia, unspecified, Ulcerative colitis, and Vitamin D deficiency, unspecified.     Physical Exam  Patient is alert and oriented x3 and in a relaxed and appropriate mood. Her gait is steady and hand grasps are equal. Sclera is clear. The breasts are nearly symmetrical. Left breast partial circumareolar incision well healed minimal hyperpigmentation secondary to radiation therapy. The tissue is soft without palpable abnormalities, discrete nodules or masses. The skin and nipples appear normal. There is no cervical, supraclavicular or axillary lymphadenopathy. Heart rate and rhythm normal, S1 and S2 appreciated. The lungs are clear to auscultation bilaterally. Abdomen is soft and non-tender.       Physical Exam     Last Recorded Vitals  Vitals:    10/10/24 1451   BP: 149/81   Pulse: 69   Resp: 18   Temp: 36.2 °C (97.2 °F)   SpO2: 94%       Relevant Results   Time was spent viewing digital images of the radiology testing with the patient. I explained the results in depth, along with suggested explanation for follow up recommendations based on the testing results. BI-RADS Category 2    Imaging  Interpreted By:  Karri Don,   Pelon Olivia   STUDY:  BI MAMMO BILATERAL DIAGNOSTIC TOMOSYNTHESIS;  10/10/2024 2:43 pm      ACCESSION NUMBER(S):  [id number]      ORDERING CLINICIAN:  NASIR QUINTERO      INDICATION:  Patient presents for 1 week bilateral diffuse breast pain. Left  lumpectomy with radiation. Right excisional biopsy.      ,Z85.3 Personal history of malignant neoplasm of breast      COMPARISON:  Mammogram 02/08/2024, biopsy images 1/22/2024.      FINDINGS:  MAMMOGRAPHY: 2D and tomosynthesis images were reviewed at 1 mm slice  thickness.      Density:  There are scattered areas of fibroglandular density.      Postsurgical changes in the upper-outer quadrant of the left breast  with biopsy clips. No suspicious masses or calcifications are  identified in either breast.      IMPRESSION:  No mammographic evidence of malignancy.      BI-RADS CATEGORY:  BI-RADS Category:  2 Benign.  Recommendation:  Annual Screening.  Recommended Date:  1 Year.  Laterality:  Bilateral.        Assessment/Plan       PLAN:  Normal clinical exam and imaging- left breast DCIS.    Patient Discussion/Summary  Your clinical examination and imaging are normal. Please return in one year for bilateral screening mammogram and office visit or sooner if you have any problems or concerns.     You can see your health information, review clinical summaries from office visits & test results online when you follow your health with MY  Chart, a personal health record. To sign up go to [email or web address]. If you need assistance with signing up or trouble getting into your account call "[email or web address]" Patient Line 24/7 at [phone].    My office phone number is [phone] if you need to get in touch with me or have additional questions or concerns. Thank you for choosing [facility] and trusting me as your healthcare provider. I look forward to seeing you again at your next office visit. I am honored to be a provider on your health care team and I remain  dedicated to helping you achieve your health goals.      Shannon Kelly, APRN-CNP

## 2024-10-10 ENCOUNTER — APPOINTMENT (OUTPATIENT)
Dept: SURGICAL ONCOLOGY | Facility: HOSPITAL | Age: 59
End: 2024-10-10
Payer: COMMERCIAL

## 2024-10-10 ENCOUNTER — HOSPITAL ENCOUNTER (OUTPATIENT)
Dept: RADIOLOGY | Facility: HOSPITAL | Age: 59
Discharge: HOME | End: 2024-10-10
Payer: COMMERCIAL

## 2024-10-10 VITALS
BODY MASS INDEX: 37.34 KG/M2 | RESPIRATION RATE: 18 BRPM | HEART RATE: 69 BPM | WEIGHT: 210.76 LBS | DIASTOLIC BLOOD PRESSURE: 81 MMHG | HEIGHT: 63 IN | SYSTOLIC BLOOD PRESSURE: 149 MMHG | OXYGEN SATURATION: 94 % | TEMPERATURE: 97.2 F

## 2024-10-10 VITALS — HEIGHT: 63 IN | BODY MASS INDEX: 36.86 KG/M2 | WEIGHT: 208 LBS

## 2024-10-10 DIAGNOSIS — Z85.3 PERSONAL HISTORY OF BREAST CANCER: ICD-10-CM

## 2024-10-10 DIAGNOSIS — D05.12 DUCTAL CARCINOMA IN SITU (DCIS) OF LEFT BREAST: Primary | ICD-10-CM

## 2024-10-10 DIAGNOSIS — N64.4 MASTODYNIA: ICD-10-CM

## 2024-10-10 PROCEDURE — 3077F SYST BP >= 140 MM HG: CPT

## 2024-10-10 PROCEDURE — 99213 OFFICE O/P EST LOW 20 MIN: CPT

## 2024-10-10 PROCEDURE — 77062 BREAST TOMOSYNTHESIS BI: CPT

## 2024-10-10 PROCEDURE — 3079F DIAST BP 80-89 MM HG: CPT

## 2024-10-10 PROCEDURE — 1036F TOBACCO NON-USER: CPT

## 2024-10-10 PROCEDURE — 3008F BODY MASS INDEX DOCD: CPT

## 2024-10-10 ASSESSMENT — PAIN SCALES - GENERAL: PAINLEVEL: 2

## 2024-10-10 NOTE — PATIENT INSTRUCTIONS
Your clinical examination and imaging are normal. Please return in one year for bilateral screening mammogram and office visit or sooner if you have any problems or concerns.     You can see your health information, review clinical summaries from office visits & test results online when you follow your health with MY  Chart, a personal health record. To sign up go to [email or web address]. If you need assistance with signing up or trouble getting into your account call [employer] Patient Line 24/7 at [phone].    My office phone number is [phone] if you need to get in touch with me or have additional questions or concerns. Thank you for choosing [facility] and trusting me as your healthcare provider. I look forward to seeing you again at your next office visit. I am honored to be a provider on your health care team and I remain dedicated to helping you achieve your health goals.

## 2024-10-14 ENCOUNTER — HOSPITAL ENCOUNTER (OUTPATIENT)
Dept: RADIOLOGY | Facility: CLINIC | Age: 59
Discharge: HOME | End: 2024-10-14
Payer: COMMERCIAL

## 2024-10-14 ENCOUNTER — APPOINTMENT (OUTPATIENT)
Dept: OBSTETRICS AND GYNECOLOGY | Facility: CLINIC | Age: 59
End: 2024-10-14
Payer: COMMERCIAL

## 2024-10-14 VITALS
HEIGHT: 63 IN | WEIGHT: 211 LBS | SYSTOLIC BLOOD PRESSURE: 118 MMHG | BODY MASS INDEX: 37.39 KG/M2 | DIASTOLIC BLOOD PRESSURE: 74 MMHG

## 2024-10-14 DIAGNOSIS — E04.2 MULTINODULAR THYROID: ICD-10-CM

## 2024-10-14 DIAGNOSIS — Z01.419 ENCOUNTER FOR GYNECOLOGICAL EXAMINATION WITHOUT ABNORMAL FINDING: Primary | ICD-10-CM

## 2024-10-14 PROCEDURE — 3078F DIAST BP <80 MM HG: CPT | Performed by: OBSTETRICS & GYNECOLOGY

## 2024-10-14 PROCEDURE — 3074F SYST BP LT 130 MM HG: CPT | Performed by: OBSTETRICS & GYNECOLOGY

## 2024-10-14 PROCEDURE — 76536 US EXAM OF HEAD AND NECK: CPT

## 2024-10-14 PROCEDURE — 99396 PREV VISIT EST AGE 40-64: CPT | Performed by: OBSTETRICS & GYNECOLOGY

## 2024-10-14 PROCEDURE — 3008F BODY MASS INDEX DOCD: CPT | Performed by: OBSTETRICS & GYNECOLOGY

## 2024-10-14 PROCEDURE — 76536 US EXAM OF HEAD AND NECK: CPT | Performed by: RADIOLOGY

## 2024-10-14 NOTE — PROGRESS NOTES
Subjective   Chelita Quiroz is a 59 y.o. female here for a routine exam.  She has a history of a hysterectomy with BSO for endometrial cancer in May 2024.  She has a history of left breast cancer.    She has no postmenopausal bleeding or discharge.  No dysuria, no change in bowel habits or pelvic pain.    Bone density in  was normal.  She is current on her colonoscopy.    Personal health questionnaire reviewed: yes.     Gynecologic History  No LMP recorded (lmp unknown). Patient is postmenopausal.  Contraception: status post hysterectomy  Last Pap: 10/2022. Results were: normal  Last mammogram: 10/10/24. Results were: normal    Obstetric History  OB History    Para Term  AB Living   2 1 1         SAB IAB Ectopic Multiple Live Births                  # Outcome Date GA Lbr Víctor/2nd Weight Sex Type Anes PTL Lv   2             1 Term                Objective   Constitutional: Alert and in no acute distress. Well developed, well nourished.   Head and Face: Head and face: Normal.    Eyes: Normal external exam - nonicteric sclera, extraocular movements intact (EOMI) and no ptosis.   Neck: No neck asymmetry. Supple. Thyroid not enlarged and there were no palpable thyroid nodules.    Pulmonary: No respiratory distress.   Chest: Breasts: Normal appearance, no nipple discharge and no skin changes. Palpation of breasts and axillae: No palpable mass and no axillary lymphadenopathy.   Abdomen: Soft nontender; no abdominal mass palpated. No organomegaly. No hernias.   Genitourinary: External genitalia: Normal. No inguinal lymphadenopathy. Bartholin's Urethral and Skenes Glands: Normal. Urethra: Normal.  Bladder: Normal on palpation. Vagina: Normal. Cervix: Absent.  Uterus: Absent.  Right Adnexa/parametria: Absent.  Left Adnexa/parametria: Absent.  Inspection of Perianal Area: Normal.   Musculoskeletal: No joint swelling seen, normal movements of all extremities.   Skin: Normal skin color and pigmentation,  normal skin turgor, and no rash.   Neurologic: Non-focal. Grossly intact.   Psychiatric: Alert and oriented x 3. Affect normal to patient baseline. Mood: Appropriate.  Physical Exam     Assessment/Plan   Healthy female exam.  This is a 59-year-old female with a normal exam.  She is now status post hysterectomy with BSO.  She is high risk HPV negative.  Likely no further Pap smears are needed.  She is current on her breast imaging with the oncology team.    I will see her routinely in 1 year.  Education reviewed: self breast exams.

## 2024-10-16 NOTE — PROGRESS NOTES
"Patient ID: Chelita Quiroz is a 59 y.o. female.  Referring Physician: No referring provider defined for this encounter.  Primary Care Provider: Holden Hare DO      Subjective    HPI  HPI:   Heidi \"Chelita\" Gabriella is a 59 y.o. Fm with a PMHx notable for left breast DCIS (s/p partial mastectomy and currently undergoing radiation) now with endometrial cancer    Interval History:  Notes an achy pain occurring rarely, she is eating and drinking normally, note sciatic pain causes her discomfort during bowel movements, denies abnormal vaginal bleeding or discharge and lower extremity edema.    They deny fever, chills, constipation, diarrhea, vaginal bleeding, nausea, vomiting, or any other symptoms other than those listed in the interval history.    PMH:  - recent diagnosis of left breast DCIS, grade 3, ER negative s/p partial mastectomy and undergoing radiation- states that her last dose of radiation is next week  - GERD  - hypothyroidism I/s/o multinodular goiter   - HTN  - cholelithiasis  - hearing loss  - HLD  - Ulcerative colitis   - Endometrial cancer     PSH:  - left breast partial mastectomy (2/15/2024, with reexcision on )  - L total hip arthroplasty  - D&C  - TLH/BSO/SLN    OBHx:  The patient is a . She underwent menopause at age 49. She used COCs for 0 years (states she took progesterone in her teens for 1-2 months). She did not use HRT. She underwent menarche at age 12    Social:  They deny recreational drug use. Endorses occasional alcohol use. History of tobacco use with cigarettes 0.5 ppd for 8 years. The patient lives at home with alone. The patient works at home.     FamHx:  - Mother: lung cancer at 72  - Father: liver cancer at 62  - Brother: lung cancer at 63  - Brother: lung cancer at 66  Their history is otherwise negative for a history of breast, ovarian, uterine, colon, pancreatic, and GI cancer.     Screening:  Cervical cancer: 10/3/2022: negative, HPV high risk negative   Mammogram:  24: " post procedure mammogram   Colonoscopy: 4/19/2022: ulcerative colitis, follow up in 5-7 years     Review of Systems - Oncology     Objective   BSA: 2.06 meters squared  /79 (BP Location: Left arm, Patient Position: Sitting, BP Cuff Size: Adult)   Pulse 70   Temp 36.3 °C (97.3 °F) (Core)   Resp 18   Wt 95.9 kg (211 lb 6.4 oz)   LMP  (LMP Unknown)   SpO2 93%   BMI 37.45 kg/m²      Family History   Problem Relation Name Age of Onset    Heart attack Mother      Lung cancer Mother      Hypertension Father      Liver cancer Father      Hypertension Brother      Lung cancer Brother      Other (psoriatic arthritis) Son         Heidi Quiroz  reports that she quit smoking about 29 years ago. Her smoking use included cigarettes. She started smoking about 36 years ago. She has a 3.5 pack-year smoking history. She has been exposed to tobacco smoke. She has never used smokeless tobacco.  She  reports current alcohol use.  She  reports no history of drug use.    Physical Exam  Constitutional:       General: She is not in acute distress.     Appearance: Normal appearance. She is normal weight. She is not toxic-appearing.   HENT:      Head: Normocephalic.      Mouth/Throat:      Mouth: Mucous membranes are moist.      Pharynx: Oropharynx is clear.   Eyes:      Extraocular Movements: Extraocular movements intact.      Conjunctiva/sclera: Conjunctivae normal.      Pupils: Pupils are equal, round, and reactive to light.   Cardiovascular:      Rate and Rhythm: Normal rate and regular rhythm.      Heart sounds: Normal heart sounds. No murmur heard.     No friction rub. No gallop.   Pulmonary:      Effort: Pulmonary effort is normal.      Breath sounds: Normal breath sounds. No wheezing or rhonchi.   Abdominal:      General: Abdomen is flat. Bowel sounds are normal. There is no distension.      Palpations: Abdomen is soft.      Tenderness: There is no abdominal tenderness.      Comments: Well healed laparoscopic incision  "sites    Genitourinary:     Comments: Normal external female genitalia without lesions or masses  Speculum exam: Smooth vagina without lesions or masses  Bimanual exam: smooth vagina without lesions or masses, surgically absent uterus, cervix, adnexa    Musculoskeletal:         General: No swelling. Normal range of motion.      Cervical back: Normal range of motion.   Skin:     General: Skin is warm and dry.   Neurological:      General: No focal deficit present.      Mental Status: She is alert and oriented to person, place, and time.   Psychiatric:         Mood and Affect: Mood normal.         Behavior: Behavior normal.         Performance Status:  Asymptomatic    Assessment/Plan      Oncology History Overview Note   - 5/17/23: TLH/BSO/SLN with stage IA grade 1 endometrioid endometrial adenocarcinoma MMRp, p53wt), no LVSI      Ductal carcinoma in situ (DCIS) of left breast   1/31/2024 Initial Diagnosis    Ductal carcinoma in situ (DCIS) of left breast     3/6/2024 Cancer Staged    Staging form: Breast, AJCC 8th Edition, Clinical stage from 3/6/2024: cTis (DCIS), cM0, G3, ER- - Signed by Samia Banda MD on 3/6/2024     3/6/2024 Cancer Staged    Staging form: Breast, AJCC 8th Edition, Pathologic stage from 3/6/2024: Stage 0 (pTis (DCIS), pN0, cM0, G3, ER-) - Signed by Samia Banda MD on 3/6/2024     Endometrial cancer determined by uterine biopsy (Multi)   4/25/2024 Initial Diagnosis    Endometrial cancer determined by uterine biopsy (Multi)     5/17/2024 Cancer Staged    Staging form: Corpus Uteri - Carcinoma and Carcinosarcoma, AJCC 8th Edition, Clinical stage from 5/17/2024: FIGO Stage IA (cT1a, cN0(sn), cM0) - Signed by Anahy Eckert MD on 6/12/2024         Heidi \"Chelita\" Gabriella is a 59 y.o. Fm with a PMHx notable for left breast DCIS (s/p partial mastectomy and currently undergoing radiation) s/p TLH/BSO/SLN in 5/2024 with stage IA grade 1  endometrial adenocarcinoma endometrioid type (p53-wt, MMR-p, no " LVSI) presenting for surveillance    # Endometrial cancer  - No evidence of recurrence by exam or symptoms   - We discussed surveillance  - We reviewed signs and symptoms of recurrence  - Plan for surveillance visit in 4 months       Scribe Attestation  By signing my name below, I, Isaac Graves   attest that this documentation has been prepared under the direction and in the presence of Anahy Eckert MD.     Provider Attestation - Scribe documentation    All medical record entries made by the Scribe were at my direction and personally dictated by me. I have reviewed the chart and agree that the record accurately reflects my personal performance of the history, physical exam, discussion and plan.    Anahy Eckert MD

## 2024-10-17 ENCOUNTER — OFFICE VISIT (OUTPATIENT)
Dept: GYNECOLOGIC ONCOLOGY | Facility: CLINIC | Age: 59
End: 2024-10-17
Payer: COMMERCIAL

## 2024-10-17 VITALS
BODY MASS INDEX: 37.45 KG/M2 | OXYGEN SATURATION: 93 % | TEMPERATURE: 97.3 F | RESPIRATION RATE: 18 BRPM | SYSTOLIC BLOOD PRESSURE: 122 MMHG | DIASTOLIC BLOOD PRESSURE: 79 MMHG | WEIGHT: 211.4 LBS | HEART RATE: 70 BPM

## 2024-10-17 DIAGNOSIS — C54.1 ENDOMETRIAL CANCER DETERMINED BY UTERINE BIOPSY (MULTI): Primary | ICD-10-CM

## 2024-10-17 DIAGNOSIS — Z85.42 ENCOUNTER FOR FOLLOW-UP SURVEILLANCE OF UTERINE CANCER: ICD-10-CM

## 2024-10-17 DIAGNOSIS — Z08 ENCOUNTER FOR FOLLOW-UP SURVEILLANCE OF UTERINE CANCER: ICD-10-CM

## 2024-10-17 PROCEDURE — 99214 OFFICE O/P EST MOD 30 MIN: CPT | Performed by: STUDENT IN AN ORGANIZED HEALTH CARE EDUCATION/TRAINING PROGRAM

## 2024-10-17 ASSESSMENT — PAIN SCALES - GENERAL: PAINLEVEL_OUTOF10: 0-NO PAIN

## 2024-11-04 ENCOUNTER — APPOINTMENT (OUTPATIENT)
Dept: SURGERY | Facility: CLINIC | Age: 59
End: 2024-11-04
Payer: COMMERCIAL

## 2024-11-04 VITALS
HEART RATE: 92 BPM | TEMPERATURE: 98 F | SYSTOLIC BLOOD PRESSURE: 134 MMHG | DIASTOLIC BLOOD PRESSURE: 84 MMHG | BODY MASS INDEX: 37.48 KG/M2 | WEIGHT: 211.6 LBS

## 2024-11-04 DIAGNOSIS — E04.2 MULTINODULAR THYROID: Primary | ICD-10-CM

## 2024-11-04 PROCEDURE — 88112 CYTOPATH CELL ENHANCE TECH: CPT | Performed by: STUDENT IN AN ORGANIZED HEALTH CARE EDUCATION/TRAINING PROGRAM

## 2024-11-04 PROCEDURE — 88112 CYTOPATH CELL ENHANCE TECH: CPT

## 2024-11-04 PROCEDURE — 99214 OFFICE O/P EST MOD 30 MIN: CPT | Performed by: SURGERY

## 2024-11-04 PROCEDURE — 88173 CYTOPATH EVAL FNA REPORT: CPT

## 2024-11-04 PROCEDURE — 88173 CYTOPATH EVAL FNA REPORT: CPT | Performed by: STUDENT IN AN ORGANIZED HEALTH CARE EDUCATION/TRAINING PROGRAM

## 2024-11-04 ASSESSMENT — PAIN SCALES - GENERAL: PAINLEVEL_OUTOF10: 2

## 2024-11-04 NOTE — PROGRESS NOTES
"Subjective   Patient ID: Heidi Quiroz \"Chelita\" is a 59 y.o. female who presents for follow-up visit of multinodular thyroid gland.    HPI I saw Mrs. Quiroz back in surgery clinic today.  I have been following her since 2018 for multinodular thyroid gland.  At her last office visit in November 2023 one of her nodules on the right side looks slightly bigger.  I discussed with her doing a biopsy.  She was not interested in a biopsy at that point and wanted to continue to follow with ultrasound.  Therefore we ordered a follow-up ultrasound which was just completed October 2024.  It shows that the right sided thyroid nodule last year that was 2.9 cm continued to increase slightly and is now up to 3.3 cm in size and is TI-RADS 4.  I would recommend that we biopsy this for her now.  Her other nodules are stable.    Since I saw her last year she has had a fairly eventful medical year.  She was diagnosed with breast cancer and underwent a lumpectomy with radiation treatment.  In addition she was also diagnosed with endometrial cancer and underwent a total abdominal hysterectomy with a bilateral salpingo-oophorectomy.  She said she is doing well from both of these issues.    She does report perhaps some occasional pressure in her anterior neck which she thinks is new.  No real true dysphagia or dyspnea.  Occasional hoarseness of her voice.    Review of Systems    Objective   Physical Exam  Vitals reviewed.   Constitutional:       Appearance: Normal appearance.      Comments: Her voice sounds fairly good in the office today.  No real raspiness noted.   Neck:      Comments: Mild palpable fullness of the right thyroid lobe compared to the left.  This would be consistent with her ultrasound.  Lymphadenopathy:      Cervical: No cervical adenopathy.   Neurological:      Mental Status: She is alert.         Narrative & Impression   Interpreted By:  Amanda Velasquez and Sullivan Shannon   STUDY:  US THYROID;  10/14/2024 8:57 am    "   INDICATION:  Signs/Symptoms:thyroid nodule surveillance.      ,E04.2 Nontoxic multinodular goiter      COMPARISON:  Thyroid ultrasound 11/01/2021      ACCESSION NUMBER(S):  [id number]      ORDERING CLINICIAN:  VIVIANA WHITMORE      TECHNIQUE:  Multiple ultrasonographic images of the thyroid gland were obtained  with color Doppler evaluation.      FINDINGS:  The thyroid is persistently enlarged with multiple nodules  identified. The thyroid lobes and 5 described nodules (1 right, 3  left, 1 isthmus) are compared to prior ultrasound 10/10/2023:      RIGHT LOBE:  The right lobe of the thyroid measures 3.6 cm x 2.8 cm x 6.9 cm. The  right lobe of the thyroid is heterogeneous in echotexture and  demonstrates a solitary nodule.      NODULE #1:  Within the mid to lower right thyroid lobe, there is a nodule with  the following features: Size: 3.3 x 2.7 x 3.3 cm, previously 2.9 x  2.7 x 3.1 cm. Composition: Solid or almost completely solid (2)  Echogenicity: Hypoechoic (2)  Shape: Wider-than-tall (0)  Margin: Smooth (0)  Echogenic Foci: None or Large comet-tail artifacts (0)  The total score of this nodule is 4 corresponding to a TI-RADS  category 4; Moderately suspicious. FNA is recommended if >1.5cm,  Follow up if >1.0cm in 1, 2, 3, and 5 years.. No significant interval  change in size, features, or TI-RADS category.          LEFT LOBE:  The left lobe of the thyroid measures 2.0 cm x 2.3 cm x 5.1 cm. The  left lobe of the thyroid is heterogeneous in echotexture and  demonstrates three nodules.      NODULE #2:  Within the mid left thyroid lobe, there is a nodule with the  following features: Size: 1.2 x 0.6 x 1.1 cm, previously 1.2 x 0.9 x  1.0 cm. Composition: spongiform (0)  The total score of this nodule is 0 corresponding to a TI-RADS  category 1; benign. No follow up or FNA is needed.. No significant  interval change in size, features, or TI-RADS category.      NODULE #3:  Within the lower left thyroid lobe, there is  a nodule with the  following features: Size: 1.2 x 1.1 x 1.2 cm, previously 1.3 x 0.9 x  1.1 cm. Composition: Solid or almost completely solid (2)  Echogenicity: Hypoechoic (2)  Shape: Wider-than-tall (0)  Margin: Ill-defined (0)  Echogenic Foci: None or Large comet-tail artifacts (0)  The total score of this nodule is 4 corresponding to a TI-RADS  category 4; Moderately suspicious. FNA is recommended if >1.5cm,  Follow up if >1.0cm in 1, 2, 3, and 5 years.. No significant interval  change in size, features, or TI-RADS category.      NODULE #4:  Within the lower left thyroid lobe, there is a nodule with the  following features: Size: 1.0 x 0.6 x 0.8 cm, previously 1.0 x 0.7 x  0.8 cm. Composition: mixed cystic and solid (1)  Echogenicity: Hypoechoic (2)  Shape: Wider-than-tall (0)  Margin: Ill-defined (0)  Echogenic Foci: None or Large comet-tail artifacts (0)  The total score of this nodule is 3 corresponding to a TI-RADS  category 3; Mildly suspicious. FNA is recommended if >2.5cm, Follow  up if >1.5cm in 1, 3, and 5 years.. No significant interval change in  size, features, or TI-RADS category.          ISTHMUS:  The isthmus measures approximately 0.8 cm and is heterogeneous in  echotexture and demonstrates a solitary nodule.      NODULE #5:  Within the right aspect of the isthmus, there is a nodule with the  following features: Size: 0.7 x 0.5 x 0.7 cm, new from prior.  Composition: Solid or almost completely solid (2)  Echogenicity: Hypoechoic (2)  Shape: Wider-than-tall (0)  Margin: Ill-defined (0)  Echogenic Foci: None or Large comet-tail artifacts (0)  The total score of this nodule is 4 corresponding to a TI-RADS  category 4; Moderately suspicious. FNA is recommended if >1.5cm,  Follow up if >1.0cm in 1, 2, 3, and 5 years..          CERVICAL LYMPH NODES:  A prominent nonenlarged left cervical neck lymph node with normal  fatty hilar morphology is noted posterior to the left thyroid lobe,  measuring 0.7 cm  "in the short axis (image 49 of 54). This is favored  to be reactive.      IMPRESSION:      1. Persistent multinodular thyroid goiter with one new and four known  nodules without interval change in TI-RADS category.  2. New TI-RADS 4 nodule in the isthmus right aspect (nodule #5).  Based on size criteria, no further dedicated follow-up or FNA is  required.  3. TI-RADS 4 mid to lower right thyroid nodule (nodule #1). Based on  size criteria, FNA is again recommended if not previously performed.       Patient ID: Heidi Quiroz \"Yasmeen" is a 59 y.o. female.    Biopsy    Date/Time: 11/4/2024 9:55 AM    Performed by: Janusz Regalado MD  Authorized by: Janusz Regalado MD    Consent:     Consent obtained:  Verbal    Consent given by:  Patient    Risks, benefits, and alternatives were discussed: yes      Risks discussed:  Bleeding and pain    Alternatives discussed:  No treatment and observation  Universal protocol:     Procedure explained and questions answered to patient or proxy's satisfaction: yes      Relevant documents present and verified: yes      Test results available: yes      Imaging studies available: yes      Required blood products, implants, devices, and special equipment available: yes      Site/side marked: no      Immediately prior to procedure, a time out was called: yes      Patient identity confirmed:  Verbally with patient  Pre-procedure details:     Skin preparation:  Povidone-iodine  Sedation:     Sedation type:  None  Anesthesia:     Anesthesia method:  Local infiltration    Local anesthetic:  Lidocaine 1% w/o epi  Procedure specific details:      Ultrasound-guided thyroid biopsy.    In the office today had an ultrasound-guided biopsy of the patient's 3.3 cm TI-RADS 3 nodule in the right thyroid lobe.  This was done under sterile conditions with 1% lidocaine for pain control.  Needle placement into the nodule was done utilizing real-time ultrasound guidance with a 6-15 MHz linear ultrasound probe. "  2 samples were taken.  Images were captured.    Patient tolerated the biopsy well with no complications.  Post-procedure details:     Procedure completion:  Tolerated      Assessment/Plan   Mrs Quiroz has a known history of a multi-nodular thyroid gland.  All of her left-sided thyroid nodules and isthmus nodule remained stable around 1 cm or less.  None of these have any worrisome features or require biopsy at this point.  However her right sided thyroid nodule continues to slowly increase now from 2.9 up to 3.3 cm over the last year.    Since I saw her last she had a new diagnosis of both breast cancer and endometrial cancer and had surgery for both issues.  Given all this along with the ongoing growth I told her I would definitely recommend a biopsy of her nodule which she was willing to proceed forward with today.    Biopsy was successfully carried out under ultrasound guidance in clinic.  She tolerated this well with no complications.    Plan    1.  I told her that I will call her toward the end of the week with biopsy reports.  If she has not heard from my office within 1 week, she can feel free to contact my office.    2.  Any further decision for surgical excision versus ongoing radiographic evaluation will be based on biopsy reports and patient symptoms.         Janusz Regalado MD 11/04/24 9:37 AM

## 2024-11-07 LAB
LABORATORY COMMENT REPORT: NORMAL
LABORATORY COMMENT REPORT: NORMAL
PATH REPORT.FINAL DX SPEC: NORMAL
PATH REPORT.GROSS SPEC: NORMAL
PATH REPORT.RELEVANT HX SPEC: NORMAL
PATH REPORT.TOTAL CANCER: NORMAL

## 2024-11-08 ENCOUNTER — TELEPHONE (OUTPATIENT)
Dept: SURGICAL ONCOLOGY | Facility: HOSPITAL | Age: 59
End: 2024-11-08
Payer: COMMERCIAL

## 2024-11-08 NOTE — TELEPHONE ENCOUNTER
Result Communication-I notified her that her biopsy was benign.  No surgery needed.    We will plan for a 1 year follow-up ultrasound to continue to monitor her nodule for any additional growth or change.    Resulted Orders   Cytology Consultation (Non-Gynecologic)   Result Value Ref Range    Case Report       Non-gynecologic Cytology                          Case: [id number]                                   Authorizing Provider:  Janusz Regalado MD        Collected:           11/04/2024 [facility]              Ordering Location:     [facility]   Received:            11/04/2024 [address] [place] [address]                                                                       Pathologist:           Annette Cortes DO                                                          Specimen:    THYROID FINE NEEDLE ASPIRATION RIGHT MID LOBE                                              Final Cytological Interpretation         A. THYROID FINE NEEDLE ASPIRATION RIGHT MID LOBE         Cytologic findings consistent with a benign follicular nodule with cystic degeneration                       Slide(s) initially screened by RENETTA SETH at [facility] [address] [facility] [id number]  By the signature on this report, the individual or group listed as making the Final Interpretation/Diagnosis certifies that they have reviewed this case.       Clinical History       59-year-old woman with history of breast and endometrial cancer with enlarging 3.3 cm TI-RADS 4 nodule right thyroid lobe      Specimen Description       A. THYROID FINE NEEDLE ASPIRATION RIGHT MID LOBE.  Received 4 direct smears (2 air-dried Diff-Quik and 2 spray-fixed) and 30 ml red clear needle rinse in Cytolyt with particles.         Specimen Processing Detail       A1 Slides Only (No Block)   A1-1 Diff Quik Stain Smear NGYN   A1-2 Diff Quik Stain Smear NGYN   A1-3 Pap Stain Smear NGYN   A1-4 Pap Stain Smear  NGYN   A1-5 Pap Stain NGYN ThinPrep          2:20 PM      Results were successfully communicated with the patient and they acknowledged their understanding.

## 2024-11-09 DIAGNOSIS — E04.2 MULTINODULAR THYROID: ICD-10-CM

## 2024-11-29 ENCOUNTER — APPOINTMENT (OUTPATIENT)
Dept: OBSTETRICS AND GYNECOLOGY | Facility: CLINIC | Age: 59
End: 2024-11-29
Payer: COMMERCIAL

## 2024-12-11 ENCOUNTER — APPOINTMENT (OUTPATIENT)
Dept: SURGERY | Facility: CLINIC | Age: 59
End: 2024-12-11
Payer: COMMERCIAL

## 2024-12-18 ENCOUNTER — OFFICE VISIT (OUTPATIENT)
Dept: GASTROENTEROLOGY | Facility: CLINIC | Age: 59
End: 2024-12-18
Payer: COMMERCIAL

## 2024-12-18 VITALS
DIASTOLIC BLOOD PRESSURE: 78 MMHG | BODY MASS INDEX: 38.45 KG/M2 | WEIGHT: 217 LBS | TEMPERATURE: 97.9 F | SYSTOLIC BLOOD PRESSURE: 135 MMHG | HEIGHT: 63 IN | HEART RATE: 71 BPM

## 2024-12-18 DIAGNOSIS — K51.00 ULCERATIVE PANCOLITIS WITHOUT COMPLICATION (MULTI): Primary | ICD-10-CM

## 2024-12-18 PROCEDURE — 1036F TOBACCO NON-USER: CPT | Performed by: NURSE PRACTITIONER

## 2024-12-18 PROCEDURE — 3075F SYST BP GE 130 - 139MM HG: CPT | Performed by: NURSE PRACTITIONER

## 2024-12-18 PROCEDURE — 3078F DIAST BP <80 MM HG: CPT | Performed by: NURSE PRACTITIONER

## 2024-12-18 PROCEDURE — 99214 OFFICE O/P EST MOD 30 MIN: CPT | Performed by: NURSE PRACTITIONER

## 2024-12-18 PROCEDURE — 3008F BODY MASS INDEX DOCD: CPT | Performed by: NURSE PRACTITIONER

## 2024-12-18 RX ORDER — SODIUM, POTASSIUM,MAG SULFATES 17.5-3.13G
SOLUTION, RECONSTITUTED, ORAL ORAL
Qty: 354 ML | Refills: 0 | Status: SHIPPED | OUTPATIENT
Start: 2024-12-18

## 2024-12-18 ASSESSMENT — PAIN SCALES - GENERAL: PAINLEVEL_OUTOF10: 0-NO PAIN

## 2024-12-18 NOTE — PATIENT INSTRUCTIONS
Ulcerative colitis, you are due for labs as follows CBC, CMP, iron, CRP.  I would also recommend a colonoscopy for assessment of your disease state.  Please follow the prep instructions that we discussed in clinic today and will be provided to you in writing once you are scheduled.  Please continue your mesalamine 4 tablets daily.    I will see you back for follow up in 6 months

## 2024-12-18 NOTE — PROGRESS NOTES
Subjective   Patient ID: Chelita Quiroz is a 59 y.o. female.    HPI  59-year-old female for follow-up visit of ulcerative colitis  Previously seen 6/14/2020 for  And she is currently on mesalamine 4 tablets daily  Medical history includes breast cancer and endometrial cancer  4/19/2022 colonoscopy showed a normal ileum, altered vascular and erythematous inflamed and ulcerated mucosa in sigmoid, descending, transverse, hepatic flexure and ascending colon  Pathology showed right and left colon focal active colitis, patchy cryptitis noted  Terminal ileum and rectum showed no significant path  Feeling good  Done with treatment  No diarrhea  When she drinks coffee gets some reflux      Objective   Physical Exam  Cardiovascular:      Rate and Rhythm: Normal rate and regular rhythm.      Pulses: Normal pulses.      Heart sounds: Normal heart sounds.   Pulmonary:      Effort: Pulmonary effort is normal.      Breath sounds: Normal breath sounds.   Abdominal:      General: Bowel sounds are normal.      Palpations: Abdomen is soft.         Assessment/Plan     Ulcerative colitis, you are due for labs as follows CBC, CMP, iron, CRP.  I would also recommend a colonoscopy for assessment of your disease state.  Please follow the prep instructions that we discussed in clinic today and will be provided to you in writing once you are scheduled.  Please continue your mesalamine 4 tablets daily.    I will see you back for follow up in 6 months

## 2024-12-20 ENCOUNTER — HOSPITAL ENCOUNTER (OUTPATIENT)
Dept: RADIATION ONCOLOGY | Facility: CLINIC | Age: 59
Setting detail: RADIATION/ONCOLOGY SERIES
Discharge: HOME | End: 2024-12-20
Payer: COMMERCIAL

## 2024-12-20 ENCOUNTER — APPOINTMENT (OUTPATIENT)
Dept: RADIATION ONCOLOGY | Facility: CLINIC | Age: 59
End: 2024-12-20
Payer: COMMERCIAL

## 2024-12-20 VITALS
HEART RATE: 60 BPM | OXYGEN SATURATION: 96 % | DIASTOLIC BLOOD PRESSURE: 71 MMHG | RESPIRATION RATE: 18 BRPM | SYSTOLIC BLOOD PRESSURE: 132 MMHG | BODY MASS INDEX: 38.19 KG/M2 | WEIGHT: 215.6 LBS | TEMPERATURE: 98.4 F

## 2024-12-20 DIAGNOSIS — Z08 ENCOUNTER FOR FOLLOW-UP SURVEILLANCE OF UTERINE CANCER: Primary | ICD-10-CM

## 2024-12-20 DIAGNOSIS — Z85.42 ENCOUNTER FOR FOLLOW-UP SURVEILLANCE OF UTERINE CANCER: Primary | ICD-10-CM

## 2024-12-20 DIAGNOSIS — D05.12 DUCTAL CARCINOMA IN SITU (DCIS) OF LEFT BREAST: ICD-10-CM

## 2024-12-20 PROCEDURE — 99213 OFFICE O/P EST LOW 20 MIN: CPT | Performed by: NURSE PRACTITIONER

## 2024-12-20 ASSESSMENT — PAIN SCALES - GENERAL: PAINLEVEL_OUTOF10: 0-NO PAIN

## 2024-12-20 NOTE — PROGRESS NOTES
"Radiation Oncology Follow-Up    Patient Name:  Heidi Quiroz  MRN:  [id number]  :  1965    Referring Provider: Nano Sultana, APR*  Primary Care Provider: Holden Hare DO  Care Team: Patient Care Team:  Holden Hare DO as PCP - General (Internal Medicine)  Anahy Eckert MD as Consulting Physician (Obstetrics and Gynecology)  Ana Gerard MD MPH as Consulting Physician (Obstetrics and Gynecology)    Date of Service: 2024   59 y.o. female with Ductal carcinoma in situ (DCIS) of left breast, Clinical: cTis (DCIS), cM0, G3, ER-     Oncologic history:    - Routine screening mammogram on 2023 which revealed grouped calcifications in the upper outer quadrant of the left breast.     - Diagnostic views performed 2023 revealed indeterminate calcifications in the upper outer quadrant. Ultrasound directed at the 1 o'clock position, 10 cm from the nipple revealed a cyst.     - On 2024 she underwent a left breast core biopsy which revealed ductal carcinoma in situ, grade 3. Estrogen receptors were negative. She underwent a left partial mastectomy on 2/15/2024. Pathology revealed a 3.6 cm ductal carcinoma in situ of the cribriform, micropapillary and solid subtypes, nuclear grade 3. The resection margins were negative although DCIS was microns from the anterior margin and less than 1 mm from the lateral and posterior margins.     - She underwent a reexcision on 2024 which was negative. Referred for radiation.     - 4/3/24 - 24: Radiation to the left breast consisting of a dose of 42.56 Gy with additional 10 Gy to the tumor bed for a total of 52.56 Gy.     - 24: Laparoscopic hysterectomy     SUBJECTIVE  History of Present Illness:   Heidi Quiroz \"Chelita\" is here today for routine radiation follow up/initial radiation survivorship visit.  She is doing well post treatment.  Endorses mild left breast tenderness and skin intact.  No swelling of left arm or difficulty with ROM. "  Denies headaches, fever, chills, cough, SOB, chest pain, n/v/c/d or bony pain.  ER- so no adjuvant endocrine therapy. Mammogram in October 2024 without evidence of malignancy.    Review of Systems:    Review of Systems   All other systems reviewed and are negative.    Performance Status:   The Karnofsky performance scale today is 100%   OBJECTIVE    Current Outpatient Medications:     acetaminophen (Tylenol) 325 mg tablet, Take 3 tablets (975 mg) by mouth every 6 hours if needed for mild pain (1 - 3) for up to 90 doses., Disp: 90 tablet, Rfl: 0    amoxicillin (Amoxil) 500 mg tablet, Take 4 tablets (2,000 mg) by mouth once daily. 1 hour prior to dental appointments, Disp: , Rfl:     cholecalciferol (Vitamin D-3) 50 mcg (2,000 unit) capsule, Take 1 capsule (50 mcg) by mouth early in the morning.., Disp: , Rfl:     ezetimibe (Zetia) 10 mg tablet, Take 1 tablet (10 mg) by mouth once daily., Disp: 90 tablet, Rfl: 3    mesalamine (Lialda) 1.2 gram EC tablet, TAKE 4 TABLETS BY MOUTH ONCE DAILY WITH THE EVENING MEAL., Disp: 360 tablet, Rfl: 2    propranolol LA (Inderal LA) 80 mg 24 hr capsule, TAKE 1 CAPSULE (80 MG) BY MOUTH ONCE DAILY. DO NOT CRUSH, CHEW, OR SPLIT., Disp: 90 capsule, Rfl: 1    rosuvastatin (Crestor) 10 mg tablet, Take 1 tablet (10 mg) by mouth once daily., Disp: 90 tablet, Rfl: 3    sodium,potassium,mag sulfates (Suprep Bowel Prep Kit) 17.5-3.13-1.6 gram solution, Take one bottle twice as directed by the prep instructions, Disp: 354 mL, Rfl: 0     Physical Exam  Vitals reviewed.   Constitutional:       Appearance: Normal appearance.   HENT:      Head: Normocephalic and atraumatic.      Nose: Nose normal.      Mouth/Throat:      Mouth: Mucous membranes are moist.      Pharynx: Oropharynx is clear.   Eyes:      Conjunctiva/sclera: Conjunctivae normal.      Pupils: Pupils are equal, round, and reactive to light.   Cardiovascular:      Heart sounds: Normal heart sounds.   Pulmonary:      Effort: Pulmonary  effort is normal.      Breath sounds: Normal breath sounds.   Chest:   Breasts:     Right: No swelling, inverted nipple, mass, nipple discharge or skin change.      Left: No swelling, inverted nipple, mass, nipple discharge or skin change.       Abdominal:      Palpations: Abdomen is soft.   Musculoskeletal:         General: No swelling. Normal range of motion.      Cervical back: Normal range of motion and neck supple.   Lymphadenopathy:      Cervical: No cervical adenopathy.      Upper Body:      Right upper body: No supraclavicular or axillary adenopathy.      Left upper body: No supraclavicular or axillary adenopathy.   Skin:     General: Skin is warm and dry.   Neurological:      General: No focal deficit present.      Mental Status: She is alert and oriented to person, place, and time.   Psychiatric:         Mood and Affect: Mood normal.         Behavior: Behavior normal.        Interpreted By:  Karri Don,   ADDENDUM:  There was a likely voice recognition error in the final sentence of  the indication.      It should read re-excision (at the left lumpectomy) rather than right  excision.      The remainder of the report is unchanged.      BI-RADS Category:  2 Benign.  Recommendation:  Annual Screening.  Recommended Date:  1 Year.  Laterality:  Bilateral.      Signed by: Karri Don 10/10/2024 5:03 PM      -------- ORIGINAL REPORT --------  Dictation workstation:   [id number]   Addended by Karri Don MD on 10/10/2024  5:03 PM     Study Result    Narrative & Impression   Interpreted By:  Karri Don and Liller Gregory   STUDY:  BI MAMMO BILATERAL DIAGNOSTIC TOMOSYNTHESIS;  10/10/2024 2:43 pm      ACCESSION NUMBER(S):  [id number]      ORDERING CLINICIAN:  NASIR QUINTERO      INDICATION:  Patient presents for 1 week bilateral diffuse breast pain. Left  lumpectomy with radiation. Right excisional biopsy.      ,Z85.3 Personal history of malignant neoplasm of breast      COMPARISON:  Mammogram  02/08/2024, biopsy images 1/22/2024.      FINDINGS:  MAMMOGRAPHY: 2D and tomosynthesis images were reviewed at 1 mm slice  thickness.      Density:  There are scattered areas of fibroglandular density.      Postsurgical changes in the upper-outer quadrant of the left breast  with biopsy clips. No suspicious masses or calcifications are  identified in either breast.      IMPRESSION:  No mammographic evidence of malignancy.      BI-RADS CATEGORY:  BI-RADS Category:  2 Benign.  Recommendation:  Annual Screening.  Recommended Date:  1 Year.  Laterality:  Bilateral.      ASSESSMENT/PLAN:  59 y.o. female with DCIS left breast s/p breast conserving surgery followed by radiation.  FUV with Dr. Banda for mammograms.  Follow up with Dr. Eckert for endometrial cancer. Radiation follow up in 6 mo. Call with any questions or concerns.     Padmini Sultana CNP  [phone]

## 2024-12-30 ENCOUNTER — APPOINTMENT (OUTPATIENT)
Dept: PRIMARY CARE | Facility: CLINIC | Age: 59
End: 2024-12-30
Payer: COMMERCIAL

## 2025-01-06 ENCOUNTER — APPOINTMENT (OUTPATIENT)
Dept: PRIMARY CARE | Facility: CLINIC | Age: 60
End: 2025-01-06
Payer: COMMERCIAL

## 2025-01-06 VITALS
OXYGEN SATURATION: 94 % | DIASTOLIC BLOOD PRESSURE: 77 MMHG | WEIGHT: 214 LBS | BODY MASS INDEX: 37.91 KG/M2 | SYSTOLIC BLOOD PRESSURE: 130 MMHG | HEART RATE: 72 BPM

## 2025-01-06 DIAGNOSIS — Z23 NEED FOR PROPHYLACTIC VACCINATION AGAINST STREPTOCOCCUS PNEUMONIAE (PNEUMOCOCCUS): Primary | ICD-10-CM

## 2025-01-06 DIAGNOSIS — I10 PRIMARY HYPERTENSION: ICD-10-CM

## 2025-01-06 DIAGNOSIS — K52.9 INFLAMMATORY BOWEL DISEASES (IBD): ICD-10-CM

## 2025-01-06 DIAGNOSIS — E78.5 DYSLIPIDEMIA: ICD-10-CM

## 2025-01-06 PROCEDURE — 1036F TOBACCO NON-USER: CPT | Performed by: INTERNAL MEDICINE

## 2025-01-06 PROCEDURE — 3078F DIAST BP <80 MM HG: CPT | Performed by: INTERNAL MEDICINE

## 2025-01-06 PROCEDURE — 90471 IMMUNIZATION ADMIN: CPT | Performed by: INTERNAL MEDICINE

## 2025-01-06 PROCEDURE — 99213 OFFICE O/P EST LOW 20 MIN: CPT | Performed by: INTERNAL MEDICINE

## 2025-01-06 PROCEDURE — 3075F SYST BP GE 130 - 139MM HG: CPT | Performed by: INTERNAL MEDICINE

## 2025-01-06 PROCEDURE — 90677 PCV20 VACCINE IM: CPT | Performed by: INTERNAL MEDICINE

## 2025-01-06 NOTE — PROGRESS NOTES
Subjective   Patient ID: Chelita Quiroz is a 59 y.o. female who presents for Follow-up.        HPI   Patient is a 58-year-old female with past medical history of chronic GERD hypothyroidism hypertension cholelithiasis ulcerative colitis and breast cancer and hearing loss, dyslipidemia who presents for follow-up    Last visit patient had lab work showing control of the LDL however there were elevated triglycerides.  Rosuvastatin was increased to 20 mg.  Most recent LDL showing slight increase relative to prior but no change in the triglycerides.  No significant weight loss.  Blood pressure is slightly higher than it was previously but patient reports it has been better at home.  Here for lipid recheck.            Review of Systems  Constitutional: No fever or chills, No Night Sweats  Eyes: No Blurry Vision or Eye sight problems  ENT: No Nasal Discharge, Hoarseness, sore throat  Cardiovascular: no chest pain, no palpitations and no syncope.   Respiratory: no cough, no shortness of breath during exertion and no shortness of breath at rest.   Gastrointestinal: no abdominal pain, no nausea and no vomiting.   : No vaginal discharge, burning with urination, no blood in urine or stools  Skin: No Skin rashes or Lesions  Neuro: No Headache, no dizziness or Numbness or tingling  Psych: No Anxiety, depression or sleeping problems  Heme: No Easy bleeding or brusing.     Objective   /77   Pulse 72   Wt 97.1 kg (214 lb)   LMP  (LMP Unknown)   SpO2 94%   BMI 37.91 kg/m²     Physical Exam  Patient declined Chaperone  Constitutional: Alert and in no acute distress. Well developed, well nourished.   Head and Face: Head and face: Normal.    Eyes: Normal external exam. Pupils were equal in size, round, reactive to light (PERRL) with normal accommodation and extraocular movements intact (EOMI).   Ears, Nose, Mouth, and Throat: External inspection of ears and nose: Normal.  Hearing: Normal.  Nasal mucosa, septum, and  turbinates: Normal.  Lips, teeth, and gums: Normal.  Oropharynx: Normal.   Neck: No neck mass was observed. Supple. Thyroid not enlarged and there were no palpable thyroid nodules.   Cardiovascular: Heart rate and rhythm were normal, normal S1 and S2. Pedal pulses: Normal. No peripheral edema.   Pulmonary: No respiratory distress. Clear bilateral breath sounds.   Breast: Normal Appearance, No Masses or lumps palpated  Abdomen: Soft nontender; no abdominal mass palpated. Normal bowel sounds. No organomegaly.   : Deferred   Musculoskeletal: No joint swelling seen, normal movements of all extremities. Range of motion: Normal.  Muscle strength/tone: Normal.    Skin: Normal skin color and pigmentation, normal skin turgor, and no rash.   Neurologic: Deep tendon reflexes were 2+ and symmetric.   Psychiatric: Judgment and insight: Intact. Mood and affect: Normal.  Lymphatic: No cervical lymphadenopathy. Palpation of lymph nodes in axillae: Normal.  Palpation of lymph nodes in groin: Normal.    Lab Results   Component Value Date    WBC 6.6 05/07/2024    HGB 14.3 05/07/2024    HCT 45.8 05/07/2024     05/07/2024    CHOL 176 05/07/2024    TRIG 225 (H) 05/07/2024    HDL 47.1 05/07/2024    ALT 20 05/07/2024    AST 17 05/07/2024     05/07/2024    K 4.2 05/07/2024     05/07/2024    CREATININE 0.75 05/07/2024    BUN 16 05/07/2024    CO2 28 05/07/2024    TSH 1.12 02/12/2024    HGBA1C 5.4 02/08/2021       US thyroid  Narrative: Interpreted By:  Amanda Velasquez and Sullivan Shannon   STUDY:  US THYROID;  10/14/2024 8:57 am      INDICATION:  Signs/Symptoms:thyroid nodule surveillance.      ,E04.2 Nontoxic multinodular goiter      COMPARISON:  Thyroid ultrasound 11/01/2021      ACCESSION NUMBER(S):  [id number]      ORDERING CLINICIAN:  VIVIANA WHITMORE      TECHNIQUE:  Multiple ultrasonographic images of the thyroid gland were obtained  with color Doppler evaluation.      FINDINGS:  The thyroid is persistently  enlarged with multiple nodules  identified. The thyroid lobes and 5 described nodules (1 right, 3  left, 1 isthmus) are compared to prior ultrasound 10/10/2023:      RIGHT LOBE:  The right lobe of the thyroid measures 3.6 cm x 2.8 cm x 6.9 cm. The  right lobe of the thyroid is heterogeneous in echotexture and  demonstrates a solitary nodule.      NODULE #1:  Within the mid to lower right thyroid lobe, there is a nodule with  the following features: Size: 3.3 x 2.7 x 3.3 cm, previously 2.9 x  2.7 x 3.1 cm. Composition: Solid or almost completely solid (2)  Echogenicity: Hypoechoic (2)  Shape: Wider-than-tall (0)  Margin: Smooth (0)  Echogenic Foci: None or Large comet-tail artifacts (0)  The total score of this nodule is 4 corresponding to a TI-RADS  category 4; Moderately suspicious. FNA is recommended if >1.5cm,  Follow up if >1.0cm in 1, 2, 3, and 5 years.. No significant interval  change in size, features, or TI-RADS category.          LEFT LOBE:  The left lobe of the thyroid measures 2.0 cm x 2.3 cm x 5.1 cm. The  left lobe of the thyroid is heterogeneous in echotexture and  demonstrates three nodules.      NODULE #2:  Within the mid left thyroid lobe, there is a nodule with the  following features: Size: 1.2 x 0.6 x 1.1 cm, previously 1.2 x 0.9 x  1.0 cm. Composition: spongiform (0)  The total score of this nodule is 0 corresponding to a TI-RADS  category 1; benign. No follow up or FNA is needed.. No significant  interval change in size, features, or TI-RADS category.      NODULE #3:  Within the lower left thyroid lobe, there is a nodule with the  following features: Size: 1.2 x 1.1 x 1.2 cm, previously 1.3 x 0.9 x  1.1 cm. Composition: Solid or almost completely solid (2)  Echogenicity: Hypoechoic (2)  Shape: Wider-than-tall (0)  Margin: Ill-defined (0)  Echogenic Foci: None or Large comet-tail artifacts (0)  The total score of this nodule is 4 corresponding to a TI-RADS  category 4; Moderately suspicious. FNA  is recommended if >1.5cm,  Follow up if >1.0cm in 1, 2, 3, and 5 years.. No significant interval  change in size, features, or TI-RADS category.      NODULE #4:  Within the lower left thyroid lobe, there is a nodule with the  following features: Size: 1.0 x 0.6 x 0.8 cm, previously 1.0 x 0.7 x  0.8 cm. Composition: mixed cystic and solid (1)  Echogenicity: Hypoechoic (2)  Shape: Wider-than-tall (0)  Margin: Ill-defined (0)  Echogenic Foci: None or Large comet-tail artifacts (0)  The total score of this nodule is 3 corresponding to a TI-RADS  category 3; Mildly suspicious. FNA is recommended if >2.5cm, Follow  up if >1.5cm in 1, 3, and 5 years.. No significant interval change in  size, features, or TI-RADS category.          ISTHMUS:  The isthmus measures approximately 0.8 cm and is heterogeneous in  echotexture and demonstrates a solitary nodule.      NODULE #5:  Within the right aspect of the isthmus, there is a nodule with the  following features: Size: 0.7 x 0.5 x 0.7 cm, new from prior.  Composition: Solid or almost completely solid (2)  Echogenicity: Hypoechoic (2)  Shape: Wider-than-tall (0)  Margin: Ill-defined (0)  Echogenic Foci: None or Large comet-tail artifacts (0)  The total score of this nodule is 4 corresponding to a TI-RADS  category 4; Moderately suspicious. FNA is recommended if >1.5cm,  Follow up if >1.0cm in 1, 2, 3, and 5 years..          CERVICAL LYMPH NODES:  A prominent nonenlarged left cervical neck lymph node with normal  fatty hilar morphology is noted posterior to the left thyroid lobe,  measuring 0.7 cm in the short axis (image 49 of 54). This is favored  to be reactive.      Impression:     1. Persistent multinodular thyroid goiter with one new and four known  nodules without interval change in TI-RADS category.  2. New TI-RADS 4 nodule in the isthmus right aspect (nodule #5).  Based on size criteria, no further dedicated follow-up or FNA is  required.  3. TI-RADS 4 mid to lower right  thyroid nodule (nodule #1). Based on  size criteria, FNA is again recommended if not previously performed.          Please note that these statements are based on the recommendations of  the American College [employer]              TI-RADS grading system. ACR TI-RADS recommendations (apply to nodules  which have NOT been biopsied):              TR5 (=7 points) highly suspicious - FNA if = 1cm, follow-up if 0.5  -0.9 cm every year for 5 years. Aggregate cancer risk 35%.      TR4 (4-6 points) moderately suspicious - FNA if = 1.5cm, follow-up if  1 -1.4 cm in 1, 2, 3 and 5 years. Aggregate cancer risk 9.1%      TR3 (3 points) mildly suspicious - FNA if = 2.5cm, follow-up if 1.5  -2.4 cm in 1, 3 and 5 years. Aggregate cancer risk 4.8%      TR2 (2 points) not suspicious. No FNA or follow-up.Aggregate cancer  risk 1.5%      TR1 (0 points) benign - No FNA or follow-up. Aggregate cancer risk  0.3%                  I personally reviewed the images/study and I agree with the findings  as stated by Radiology resident Dr. Childress.      MACRO:  None      Signed by: Amanda Velasquez 10/14/2024 2:16 PM  Dictation workstation:   [id number]      Assessment/Plan   Problem List Items Addressed This Visit       Hypertension    Relevant Orders    TSH with reflex to Free T4 if abnormal    Follow Up In [facility] - PCP - Health Maintenance    Dyslipidemia    Relevant Orders    Lipid Panel     Other Visit Diagnoses       Need for prophylactic vaccination against Streptococcus pneumoniae (pneumococcus)    -  Primary    Relevant Orders    Pneumococcal conjugate vaccine, 20-valent (PREVNAR 20) (Completed)    TSH with reflex to Free T4 if abnormal    Inflammatory bowel diseases (IBD)        Relevant Orders    Vitamin D 25-Hydroxy,Total (for eval of Vitamin D levels)    TSH with reflex to Free T4 if abnormal          Continue with current dosing of statin.  Check LDL.  If still elevated may need further adjustments in medications.   Advised Mediterranean diet.  Follow-up 6 months.  Continue following with breast team for regular breast exams.

## 2025-01-14 ENCOUNTER — APPOINTMENT (OUTPATIENT)
Dept: RADIOLOGY | Facility: CLINIC | Age: 60
End: 2025-01-14
Payer: COMMERCIAL

## 2025-01-14 ENCOUNTER — APPOINTMENT (OUTPATIENT)
Dept: SURGICAL ONCOLOGY | Facility: CLINIC | Age: 60
End: 2025-01-14
Payer: COMMERCIAL

## 2025-02-11 LAB
25(OH)D3+25(OH)D2 SERPL-MCNC: 41 NG/ML (ref 30–100)
ALBUMIN SERPL-MCNC: 4.1 G/DL (ref 3.6–5.1)
ALP SERPL-CCNC: 77 U/L (ref 37–153)
ALT SERPL-CCNC: 17 U/L (ref 6–29)
ANION GAP SERPL CALCULATED.4IONS-SCNC: 9 MMOL/L (CALC) (ref 7–17)
AST SERPL-CCNC: 18 U/L (ref 10–35)
BILIRUB SERPL-MCNC: 0.5 MG/DL (ref 0.2–1.2)
BUN SERPL-MCNC: 14 MG/DL (ref 7–25)
CALCIUM SERPL-MCNC: 9 MG/DL (ref 8.6–10.4)
CHLORIDE SERPL-SCNC: 105 MMOL/L (ref 98–110)
CHOLEST SERPL-MCNC: 158 MG/DL
CHOLEST/HDLC SERPL: 3 (CALC)
CO2 SERPL-SCNC: 27 MMOL/L (ref 20–32)
CREAT SERPL-MCNC: 0.82 MG/DL (ref 0.5–1.03)
CRP SERPL-MCNC: 7.2 MG/L
EGFRCR SERPLBLD CKD-EPI 2021: 82 ML/MIN/1.73M2
ERYTHROCYTE [DISTWIDTH] IN BLOOD BY AUTOMATED COUNT: 13.1 % (ref 11–15)
ERYTHROCYTE [SEDIMENTATION RATE] IN BLOOD BY WESTERGREN METHOD: 14 MM/H
GLUCOSE SERPL-MCNC: 95 MG/DL (ref 65–99)
HCT VFR BLD AUTO: 43.7 % (ref 35–45)
HDLC SERPL-MCNC: 53 MG/DL
HGB BLD-MCNC: 14.1 G/DL (ref 11.7–15.5)
IRON SATN MFR SERPL: 30 % (CALC) (ref 16–45)
IRON SERPL-MCNC: 85 MCG/DL (ref 45–160)
LDLC SERPL CALC-MCNC: 75 MG/DL (CALC)
MCH RBC QN AUTO: 27.4 PG (ref 27–33)
MCHC RBC AUTO-ENTMCNC: 32.3 G/DL (ref 32–36)
MCV RBC AUTO: 85 FL (ref 80–100)
NONHDLC SERPL-MCNC: 105 MG/DL (CALC)
PLATELET # BLD AUTO: 191 THOUSAND/UL (ref 140–400)
PMV BLD REES-ECKER: 11.5 FL (ref 7.5–12.5)
POTASSIUM SERPL-SCNC: 4.1 MMOL/L (ref 3.5–5.3)
PROT SERPL-MCNC: 6.7 G/DL (ref 6.1–8.1)
RBC # BLD AUTO: 5.14 MILLION/UL (ref 3.8–5.1)
SODIUM SERPL-SCNC: 141 MMOL/L (ref 135–146)
TIBC SERPL-MCNC: 288 MCG/DL (CALC) (ref 250–450)
TRIGL SERPL-MCNC: 209 MG/DL
TSH SERPL-ACNC: 0.44 MIU/L (ref 0.4–4.5)
WBC # BLD AUTO: 6.1 THOUSAND/UL (ref 3.8–10.8)

## 2025-02-26 NOTE — PROGRESS NOTES
"Patient ID: Chelita Quiroz is a 59 y.o. female.  Referring Physician: No referring provider defined for this encounter.  Primary Care Provider: Holden Hare DO      Subjective    HPI  HPI:   Heidi \"Chelita\" Gabriella is a 59 y.o. Fm with a PMHx notable for left breast DCIS (s/p partial mastectomy and currently undergoing radiation) now with endometrial cancer    Interval History:  Chelita is going to the bathroom normally and eating and drinking normally and denies abnormal vaginal discharge and spotting/bleeding. Notes stable lower extremity edema and denies pain.     They deny fever, chills, constipation, diarrhea, vaginal bleeding, nausea, vomiting, or any other symptoms other than those listed in the interval history.    PMH:  - recent diagnosis of left breast DCIS, grade 3, ER negative s/p partial mastectomy and undergoing radiation- states that her last dose of radiation is next week  - GERD  - hypothyroidism I/s/o multinodular goiter   - HTN  - cholelithiasis  - hearing loss  - HLD  - Ulcerative colitis   - Endometrial cancer     PSH:  - left breast partial mastectomy (2/15/2024, with reexcision on )  - L total hip arthroplasty  - D&C  - TLH/BSO/SLN    OBHx:  The patient is a . She underwent menopause at age 49. She used COCs for 0 years (states she took progesterone in her teens for 1-2 months). She did not use HRT. She underwent menarche at age 12    Social:  They deny recreational drug use. Endorses occasional alcohol use. History of tobacco use with cigarettes 0.5 ppd for 8 years. The patient lives at home with alone. The patient works at home.     FamHx:  - Mother: lung cancer at 72  - Father: liver cancer at 62  - Brother: lung cancer at 63  - Brother: lung cancer at 66  Their history is otherwise negative for a history of breast, ovarian, uterine, colon, pancreatic, and GI cancer.     Screening:  Cervical cancer: 10/3/2022: negative, HPV high risk negative   Mammogram:  24: post procedure mammogram "   Colonoscopy: 4/19/2022: ulcerative colitis, follow up in 5-7 years     Review of Systems - Oncology     Objective   BSA: 2.11 meters squared  /85   Pulse 66   Temp 35.7 °C (96.3 °F) (Core)   Resp 18   Wt 99.7 kg (219 lb 14.5 oz)   LMP  (LMP Unknown)   SpO2 95%   BMI 38.96 kg/m²      Family History   Problem Relation Name Age of Onset    Heart attack Mother      Lung cancer Mother      Hypertension Father      Liver cancer Father      Hypertension Brother      Lung cancer Brother      Other (psoriatic arthritis) Son         Heidi Quiroz  reports that she quit smoking about 30 years ago. Her smoking use included cigarettes. She started smoking about 37 years ago. She has a 3.5 pack-year smoking history. She has been exposed to tobacco smoke. She has never used smokeless tobacco.  She  reports current alcohol use.  She  reports no history of drug use.    Physical Exam  Constitutional:       General: She is not in acute distress.     Appearance: Normal appearance. She is normal weight. She is not toxic-appearing.   HENT:      Head: Normocephalic.      Mouth/Throat:      Mouth: Mucous membranes are moist.      Pharynx: Oropharynx is clear.   Eyes:      Extraocular Movements: Extraocular movements intact.      Conjunctiva/sclera: Conjunctivae normal.      Pupils: Pupils are equal, round, and reactive to light.   Cardiovascular:      Rate and Rhythm: Normal rate and regular rhythm.      Heart sounds: Normal heart sounds. No murmur heard.     No friction rub. No gallop.   Pulmonary:      Effort: Pulmonary effort is normal.      Breath sounds: Normal breath sounds. No wheezing or rhonchi.   Abdominal:      General: Abdomen is flat. Bowel sounds are normal. There is no distension.      Palpations: Abdomen is soft.      Tenderness: There is no abdominal tenderness.      Comments: Well healed laparoscopic incision sites    Genitourinary:     Comments: Normal external female genitalia without lesions or  "masses  Speculum exam: Smooth vagina without lesions or masses  Bimanual exam: smooth vagina without lesions or masses, surgically absent uterus, cervix, adnexa    Musculoskeletal:         General: No swelling. Normal range of motion.      Cervical back: Normal range of motion.   Skin:     General: Skin is warm and dry.   Neurological:      General: No focal deficit present.      Mental Status: She is alert and oriented to person, place, and time.   Psychiatric:         Mood and Affect: Mood normal.         Behavior: Behavior normal.         Performance Status:  Asymptomatic    Assessment/Plan      Oncology History Overview Note   - 5/17/24: TLH/BSO/SLN with stage IA grade 1 endometrioid endometrial adenocarcinoma MMRp, p53wt), no LVSI      Ductal carcinoma in situ (DCIS) of left breast   1/31/2024 Initial Diagnosis    Ductal carcinoma in situ (DCIS) of left breast     3/6/2024 Cancer Staged    Staging form: Breast, AJCC 8th Edition, Clinical stage from 3/6/2024: cTis (DCIS), cM0, G3, ER- - Signed by Samia Banda MD on 3/6/2024     3/6/2024 Cancer Staged    Staging form: Breast, AJCC 8th Edition, Pathologic stage from 3/6/2024: Stage 0 (pTis (DCIS), pN0, cM0, G3, ER-) - Signed by Samia Banda MD on 3/6/2024     Endometrial cancer determined by uterine biopsy (Multi)   4/25/2024 Initial Diagnosis    Endometrial cancer determined by uterine biopsy (Multi)     5/17/2024 Cancer Staged    Staging form: Corpus Uteri - Carcinoma and Carcinosarcoma, AJCC 8th Edition, Clinical stage from 5/17/2024: FIGO Stage IA (cT1a, cN0(sn), cM0) - Signed by Anahy Eckert MD on 6/12/2024         Heidi \"Chelita\" Gabriella is a 59 y.o. Fm with a PMHx notable for left breast DCIS (s/p partial mastectomy and currently undergoing radiation) s/p TLH/BSO/SLN in 5/2024 with stage IA grade 1 endometrial adenocarcinoma endometrioid type (p53-wt, MMR-p, no LVSI) presenting for surveillance    # Endometrial cancer  - No evidence of recurrence by " exam or symptoms   - We discussed surveillance  - We reviewed signs and symptoms of recurrence  - Plan for surveillance visit in 4 months       Scribe Attestation  By signing my name below, I, Isaac Graves   attest that this documentation has been prepared under the direction and in the presence of Anahy Eckert MD.     Provider Attestation - Scribe documentation    All medical record entries made by the Scribe were at my direction and personally dictated by me. I have reviewed the chart and agree that the record accurately reflects my personal performance of the history, physical exam, discussion and plan.    Anahy Eckert MD

## 2025-02-27 ENCOUNTER — OFFICE VISIT (OUTPATIENT)
Dept: GYNECOLOGIC ONCOLOGY | Facility: CLINIC | Age: 60
End: 2025-02-27
Payer: COMMERCIAL

## 2025-02-27 VITALS
WEIGHT: 219.91 LBS | TEMPERATURE: 96.3 F | DIASTOLIC BLOOD PRESSURE: 85 MMHG | SYSTOLIC BLOOD PRESSURE: 129 MMHG | BODY MASS INDEX: 38.96 KG/M2 | HEART RATE: 66 BPM | RESPIRATION RATE: 18 BRPM | OXYGEN SATURATION: 95 %

## 2025-02-27 DIAGNOSIS — Z85.42 ENCOUNTER FOR FOLLOW-UP SURVEILLANCE OF UTERINE CANCER: Primary | ICD-10-CM

## 2025-02-27 DIAGNOSIS — Z08 ENCOUNTER FOR FOLLOW-UP SURVEILLANCE OF UTERINE CANCER: Primary | ICD-10-CM

## 2025-02-27 DIAGNOSIS — C54.1 ENDOMETRIAL CANCER DETERMINED BY UTERINE BIOPSY (MULTI): ICD-10-CM

## 2025-02-27 PROCEDURE — 99214 OFFICE O/P EST MOD 30 MIN: CPT | Performed by: STUDENT IN AN ORGANIZED HEALTH CARE EDUCATION/TRAINING PROGRAM

## 2025-02-27 PROCEDURE — 3079F DIAST BP 80-89 MM HG: CPT | Performed by: STUDENT IN AN ORGANIZED HEALTH CARE EDUCATION/TRAINING PROGRAM

## 2025-02-27 PROCEDURE — 3074F SYST BP LT 130 MM HG: CPT | Performed by: STUDENT IN AN ORGANIZED HEALTH CARE EDUCATION/TRAINING PROGRAM

## 2025-02-27 PROCEDURE — 1036F TOBACCO NON-USER: CPT | Performed by: STUDENT IN AN ORGANIZED HEALTH CARE EDUCATION/TRAINING PROGRAM

## 2025-02-27 ASSESSMENT — ENCOUNTER SYMPTOMS
DEPRESSION: 0
LOSS OF SENSATION IN FEET: 0
OCCASIONAL FEELINGS OF UNSTEADINESS: 0

## 2025-02-27 ASSESSMENT — PAIN SCALES - GENERAL: PAINLEVEL_OUTOF10: 0-NO PAIN

## 2025-03-24 ENCOUNTER — APPOINTMENT (OUTPATIENT)
Dept: OPHTHALMOLOGY | Facility: CLINIC | Age: 60
End: 2025-03-24
Payer: COMMERCIAL

## 2025-03-24 DIAGNOSIS — H35.371 EPIRETINAL MEMBRANE (ERM) OF RIGHT EYE: Primary | ICD-10-CM

## 2025-03-24 DIAGNOSIS — H52.03 HYPEROPIA, BILATERAL: ICD-10-CM

## 2025-03-24 DIAGNOSIS — H52.4 PRESBYOPIA: ICD-10-CM

## 2025-03-24 PROCEDURE — 92134 CPTRZ OPH DX IMG PST SGM RTA: CPT | Performed by: OPHTHALMOLOGY

## 2025-03-24 PROCEDURE — 92004 COMPRE OPH EXAM NEW PT 1/>: CPT | Performed by: OPHTHALMOLOGY

## 2025-03-24 PROCEDURE — 92015 DETERMINE REFRACTIVE STATE: CPT | Performed by: OPHTHALMOLOGY

## 2025-03-24 ASSESSMENT — REFRACTION_MANIFEST
OD_AXIS: 110
OS_CYLINDER: -1.00
OD_SPHERE: +1.75
OS_AXIS: 090
OD_SPHERE: +1.50
OS_SPHERE: +2.00
OS_SPHERE: +1.50
METHOD_AUTOREFRACTION: 1
OD_AXIS: 120
OD_CYLINDER: -1.00
OS_AXIS: 080
OS_ADD: +2.75
OS_CYLINDER: -1.25
OD_ADD: +2.75
OD_CYLINDER: -0.75

## 2025-03-24 ASSESSMENT — VISUAL ACUITY
OS_CC: 20/20
CORRECTION_TYPE: GLASSES
OD_CC: 20/20
OD_CC+: -1
OS_CC+: -1
METHOD: SNELLEN - LINEAR

## 2025-03-24 ASSESSMENT — CONF VISUAL FIELD
OS_SUPERIOR_NASAL_RESTRICTION: 0
OS_INFERIOR_TEMPORAL_RESTRICTION: 0
OD_INFERIOR_NASAL_RESTRICTION: 0
OS_INFERIOR_NASAL_RESTRICTION: 0
METHOD: COUNTING FINGERS
OD_SUPERIOR_NASAL_RESTRICTION: 0
OD_INFERIOR_TEMPORAL_RESTRICTION: 0
OD_NORMAL: 1
OS_SUPERIOR_TEMPORAL_RESTRICTION: 0
OD_SUPERIOR_TEMPORAL_RESTRICTION: 0
OS_NORMAL: 1

## 2025-03-24 ASSESSMENT — TONOMETRY
IOP_METHOD: TONOPEN
OS_IOP_MMHG: 20
OD_IOP_MMHG: 19

## 2025-03-24 ASSESSMENT — ENCOUNTER SYMPTOMS
RESPIRATORY NEGATIVE: 0
NEUROLOGICAL NEGATIVE: 0
ALLERGIC/IMMUNOLOGIC NEGATIVE: 0
EYES NEGATIVE: 1
GASTROINTESTINAL NEGATIVE: 0
ENDOCRINE NEGATIVE: 0
HEMATOLOGIC/LYMPHATIC NEGATIVE: 0
MUSCULOSKELETAL NEGATIVE: 0
CONSTITUTIONAL NEGATIVE: 0
CARDIOVASCULAR NEGATIVE: 0
PSYCHIATRIC NEGATIVE: 0

## 2025-03-24 ASSESSMENT — EXTERNAL EXAM - LEFT EYE: OS_EXAM: NORMAL

## 2025-03-24 ASSESSMENT — CUP TO DISC RATIO
OS_RATIO: 0.3
OD_RATIO: 0.3

## 2025-03-24 ASSESSMENT — EXTERNAL EXAM - RIGHT EYE: OD_EXAM: NORMAL

## 2025-03-24 NOTE — PROGRESS NOTES
Assessment/Plan   Diagnoses and all orders for this visit:  Epiretinal membrane (ERM) of right eye  -     OCT, Retina - OU - Both Eyes    Hyperopia, bilateral  Presbyopia  Glasses prescription given to patient today   Retina consult

## 2025-04-21 ENCOUNTER — APPOINTMENT (OUTPATIENT)
Dept: OPHTHALMOLOGY | Facility: CLINIC | Age: 60
End: 2025-04-21
Payer: COMMERCIAL

## 2025-04-24 DIAGNOSIS — K51.90 ULCERATIVE COLITIS, UNSPECIFIED, WITHOUT COMPLICATIONS (MULTI): ICD-10-CM

## 2025-04-24 RX ORDER — MESALAMINE 1.2 G/1
4.8 TABLET, DELAYED RELEASE ORAL
Qty: 360 TABLET | Refills: 2 | Status: SHIPPED | OUTPATIENT
Start: 2025-04-24

## 2025-04-28 ENCOUNTER — APPOINTMENT (OUTPATIENT)
Dept: GASTROENTEROLOGY | Facility: HOSPITAL | Age: 60
End: 2025-04-28
Payer: COMMERCIAL

## 2025-05-06 DIAGNOSIS — I10 PRIMARY HYPERTENSION: ICD-10-CM

## 2025-05-07 RX ORDER — PROPRANOLOL HYDROCHLORIDE 80 MG/1
80 CAPSULE, EXTENDED RELEASE ORAL DAILY
Qty: 90 CAPSULE | Refills: 1 | Status: SHIPPED | OUTPATIENT
Start: 2025-05-07

## 2025-05-19 ENCOUNTER — APPOINTMENT (OUTPATIENT)
Dept: OPHTHALMOLOGY | Facility: CLINIC | Age: 60
End: 2025-05-19
Payer: COMMERCIAL

## 2025-05-19 DIAGNOSIS — H35.371 EPIRETINAL MEMBRANE (ERM) OF RIGHT EYE: Primary | ICD-10-CM

## 2025-05-19 PROCEDURE — 92134 CPTRZ OPH DX IMG PST SGM RTA: CPT

## 2025-05-19 PROCEDURE — 99213 OFFICE O/P EST LOW 20 MIN: CPT

## 2025-05-19 ASSESSMENT — ENCOUNTER SYMPTOMS
ALLERGIC/IMMUNOLOGIC NEGATIVE: 0
MUSCULOSKELETAL NEGATIVE: 0
EYES NEGATIVE: 1
RESPIRATORY NEGATIVE: 0
NEUROLOGICAL NEGATIVE: 0
CONSTITUTIONAL NEGATIVE: 0
CARDIOVASCULAR NEGATIVE: 0
HEMATOLOGIC/LYMPHATIC NEGATIVE: 0
ENDOCRINE NEGATIVE: 0
PSYCHIATRIC NEGATIVE: 0
GASTROINTESTINAL NEGATIVE: 0

## 2025-05-19 ASSESSMENT — REFRACTION_WEARINGRX
OS_ADD: +2.75
OD_AXIS: 120
OS_AXIS: 090
OD_ADD: +2.75
OD_CYLINDER: -1.00
OS_CYLINDER: -1.25
OD_SPHERE: +1.50
OS_SPHERE: +1.50

## 2025-05-19 ASSESSMENT — VISUAL ACUITY
OS_CC: 20/20
CORRECTION_TYPE: GLASSES
OD_PH_CC: 20/25
METHOD: SNELLEN - LINEAR
OD_CC+: -2
OD_CC: 20/60

## 2025-05-19 ASSESSMENT — CUP TO DISC RATIO
OS_RATIO: 0.3
OD_RATIO: 0.3

## 2025-05-19 ASSESSMENT — TONOMETRY
OS_IOP_MMHG: 14
IOP_METHOD: TONOPEN
OD_IOP_MMHG: 17

## 2025-05-19 ASSESSMENT — EXTERNAL EXAM - LEFT EYE: OS_EXAM: NORMAL

## 2025-05-19 ASSESSMENT — EXTERNAL EXAM - RIGHT EYE: OD_EXAM: NORMAL

## 2025-05-19 NOTE — PROGRESS NOTES
Epiretinal membrane (ERM) of left eyeH35.372  - Mild metamorphopsia or blurring of vision  - VA 20/25 with PH    OCT 05/19/25     - Right eye (OD): Normal foveal contour, intact RPE  outer and inner retinal layers. No IRF or SRF    - Left eye (OS): Stage 1 ERM; no significant retinal thickening, intact RPE, outer and inner retinal layers. No IRF or SRF     #Plan#  - Observe  - RTC in 6 month

## 2025-05-19 NOTE — PROGRESS NOTES
Assessment/Plan   Diagnoses and all orders for this visit:  Epiretinal membrane (ERM) of right eye  Referred     OCT : 05/19/25   OD: ERM  OS: Normal Foveal Contour & Retinal laminations, EZ line preserved, (-) IRF/subretinal fluid (SRF), CST-WNL    Hyperopia, bilateral  Presbyopia  Managed by Dr. Alonso

## 2025-06-04 NOTE — PROGRESS NOTES
"Patient ID: Chelita Quiroz is a 60 y.o. female.  Referring Physician: No referring provider defined for this encounter.  Primary Care Provider: Holden Hare DO      Subjective    HPI  HPI:   Heidi \"Chelita\" Gabriella is a 60 y.o. Fm with a PMHx notable for left breast DCIS (s/p partial mastectomy and currently undergoing radiation) now with endometrial cancer    Interval History:  Notes weight fluctuations +/- 10lbs, notes HARRIS that is worsening.   They deny fever, chills, constipation, diarrhea, vaginal bleeding, nausea, vomiting, or any other symptoms other than those listed in the interval history.    PMH:  - recent diagnosis of left breast DCIS, grade 3, ER negative s/p partial mastectomy and undergoing radiation- states that her last dose of radiation is next week  - GERD  - hypothyroidism I/s/o multinodular goiter   - HTN  - cholelithiasis  - hearing loss  - HLD  - Ulcerative colitis   - Endometrial cancer     PSH:  - left breast partial mastectomy (2/15/2024, with reexcision on )  - L total hip arthroplasty  - D&C  - TLH/BSO/SLN    OBHx:  The patient is a . She underwent menopause at age 49. She used COCs for 0 years (states she took progesterone in her teens for 1-2 months). She did not use HRT. She underwent menarche at age 12    Social:  They deny recreational drug use. Endorses occasional alcohol use. History of tobacco use with cigarettes 0.5 ppd for 8 years. The patient lives at home with alone. The patient works at home.     FamHx:  - Mother: lung cancer at 72  - Father: liver cancer at 62  - Brother: lung cancer at 63  - Brother: lung cancer at 66  Their history is otherwise negative for a history of breast, ovarian, uterine, colon, pancreatic, and GI cancer.     Screening:  Cervical cancer: 10/3/2022: negative, HPV high risk negative   Mammogram:  24: post procedure mammogram   Colonoscopy: 2022: ulcerative colitis, follow up in 5-7 years     Review of Systems - Oncology     Objective "   BSA: 2.08 meters squared  /89   Pulse 65   Temp 36.7 °C (98.1 °F)   Resp 18   Wt 97 kg (213 lb 14.4 oz)   LMP  (LMP Unknown)   SpO2 94%   BMI 37.89 kg/m²      Family History   Problem Relation Name Age of Onset    Heart attack Mother Victoria Quiroz     Lung cancer Mother Victoria Lojadisrochelle     Arthritis Mother Victoria Quiroz     Heart disease Mother Victoria Lojadish     Hyperlipidemia Mother Victoria Lojadish     Hypertension Mother Victoria Lojadish     Obesity Mother Victoria Lojadish     Cancer Mother Victoria Lojadisrochelle     Hypertension Father Phineas Kadish     Liver cancer Father Phineas Kadish     Arthritis Father Phineas Kadish     Hyperlipidemia Father Phineas Kadish     Obesity Father Phineas Kadish     Cancer Father Phineas Kadish     Hypertension Brother Babar Kadish     Lung cancer Brother Babar Quiroz     Other (psoriatic arthritis) Son      Hyperlipidemia Brother Titi Kadish     Hypertension Brother Titi Kadish     Lung cancer Brother Titi Kadish     Obesity Brother Titi Kadish     Cancer Brother Titi Kadish     Hyperlipidemia Brother Marcos Kadish     Hypertension Brother Marcos Kadish     Lung cancer Brother Marcos Kadish     Obesity Brother Marcos Kadish     Cancer Brother Marcos Kadisrochelle     Hyperlipidemia Brother Lotus Kaconsuelo     Hypertension Brother Lotus Osorioh        Heidi Quiroz  reports that she quit smoking about 30 years ago. Her smoking use included cigarettes. She started smoking about 37 years ago. She has a 3.5 pack-year smoking history. She has been exposed to tobacco smoke. She has never used smokeless tobacco.  She  reports current alcohol use.  She  reports no history of drug use.    Physical Exam  Constitutional:       General: She is not in acute distress.     Appearance: Normal appearance. She is normal weight. She is not toxic-appearing.   HENT:      Head: Normocephalic.      Mouth/Throat:      Mouth: Mucous membranes are moist.      Pharynx: Oropharynx is clear.   Eyes:      Extraocular  Movements: Extraocular movements intact.      Conjunctiva/sclera: Conjunctivae normal.      Pupils: Pupils are equal, round, and reactive to light.   Cardiovascular:      Rate and Rhythm: Normal rate and regular rhythm.      Heart sounds: Normal heart sounds. No murmur heard.     No friction rub. No gallop.   Pulmonary:      Effort: Pulmonary effort is normal.      Breath sounds: Normal breath sounds. No wheezing or rhonchi.   Abdominal:      General: Abdomen is flat. Bowel sounds are normal. There is no distension.      Palpations: Abdomen is soft.      Tenderness: There is no abdominal tenderness.      Comments: Well healed laparoscopic incision sites    Genitourinary:     Comments: Normal external female genitalia without lesions or masses  Speculum exam: Smooth vagina without lesions or masses  Bimanual exam: smooth vagina without lesions or masses, surgically absent uterus, cervix, adnexa    Musculoskeletal:         General: No swelling. Normal range of motion.      Cervical back: Normal range of motion.   Skin:     General: Skin is warm and dry.   Neurological:      General: No focal deficit present.      Mental Status: She is alert and oriented to person, place, and time.   Psychiatric:         Mood and Affect: Mood normal.         Behavior: Behavior normal.         Performance Status:  Asymptomatic    Assessment/Plan      Oncology History Overview Note   - 5/17/24: TLH/BSO/SLN with stage IA grade 1 endometrioid endometrial adenocarcinoma MMRp, p53wt), no LVSI      Ductal carcinoma in situ (DCIS) of left breast   1/31/2024 Initial Diagnosis    Ductal carcinoma in situ (DCIS) of left breast     3/6/2024 Cancer Staged    Staging form: Breast, AJCC 8th Edition, Clinical stage from 3/6/2024: cTis (DCIS), cM0, G3, ER- - Signed by Samia Banda MD on 3/6/2024     3/6/2024 Cancer Staged    Staging form: Breast, AJCC 8th Edition, Pathologic stage from 3/6/2024: Stage 0 (pTis (DCIS), pN0, cM0, G3, ER-) - Signed by  "Samia Banda MD on 3/6/2024     Endometrial cancer determined by uterine biopsy (Multi)   4/25/2024 Initial Diagnosis    Endometrial cancer determined by uterine biopsy (Multi)     5/17/2024 Cancer Staged    Staging form: Corpus Uteri - Carcinoma and Carcinosarcoma, AJCC 8th Edition, Clinical stage from 5/17/2024: FIGO Stage IA (cT1a, cN0(sn), cM0) - Signed by Anahy Eckert MD on 6/12/2024         Heidi \"Chelita\" Gabriella is a 60 y.o. Fm with a PMHx notable for left breast DCIS (s/p partial mastectomy and currently undergoing radiation) s/p TLH/BSO/SLN in 5/2024 with stage IA grade 1 endometrial adenocarcinoma endometrioid type (p53-wt, MMR-p, no LVSI) presenting for surveillance    # Endometrial cancer  - No evidence of recurrence by exam or symptoms   - We discussed surveillance  - We reviewed signs and symptoms of recurrence  - Plan for surveillance visit in 4 months     Anahy Eckert MD, MS       "

## 2025-06-05 ENCOUNTER — OFFICE VISIT (OUTPATIENT)
Dept: GYNECOLOGIC ONCOLOGY | Facility: CLINIC | Age: 60
End: 2025-06-05
Payer: COMMERCIAL

## 2025-06-05 VITALS
BODY MASS INDEX: 37.89 KG/M2 | TEMPERATURE: 98.1 F | RESPIRATION RATE: 18 BRPM | WEIGHT: 213.9 LBS | OXYGEN SATURATION: 94 % | DIASTOLIC BLOOD PRESSURE: 89 MMHG | HEART RATE: 65 BPM | SYSTOLIC BLOOD PRESSURE: 148 MMHG

## 2025-06-05 DIAGNOSIS — I10 PRIMARY HYPERTENSION: ICD-10-CM

## 2025-06-05 DIAGNOSIS — Z85.42 ENCOUNTER FOR FOLLOW-UP SURVEILLANCE OF UTERINE CANCER: ICD-10-CM

## 2025-06-05 DIAGNOSIS — E78.5 DYSLIPIDEMIA: ICD-10-CM

## 2025-06-05 DIAGNOSIS — Z08 ENCOUNTER FOR FOLLOW-UP SURVEILLANCE OF UTERINE CANCER: ICD-10-CM

## 2025-06-05 DIAGNOSIS — C54.1 ENDOMETRIAL CANCER DETERMINED BY UTERINE BIOPSY (MULTI): Primary | ICD-10-CM

## 2025-06-05 PROCEDURE — 3077F SYST BP >= 140 MM HG: CPT | Performed by: STUDENT IN AN ORGANIZED HEALTH CARE EDUCATION/TRAINING PROGRAM

## 2025-06-05 PROCEDURE — 99214 OFFICE O/P EST MOD 30 MIN: CPT | Performed by: STUDENT IN AN ORGANIZED HEALTH CARE EDUCATION/TRAINING PROGRAM

## 2025-06-05 PROCEDURE — 1036F TOBACCO NON-USER: CPT | Performed by: STUDENT IN AN ORGANIZED HEALTH CARE EDUCATION/TRAINING PROGRAM

## 2025-06-05 PROCEDURE — 3079F DIAST BP 80-89 MM HG: CPT | Performed by: STUDENT IN AN ORGANIZED HEALTH CARE EDUCATION/TRAINING PROGRAM

## 2025-06-05 ASSESSMENT — PAIN SCALES - GENERAL: PAINLEVEL_OUTOF10: 0-NO PAIN

## 2025-06-06 RX ORDER — ROSUVASTATIN CALCIUM 10 MG/1
10 TABLET, COATED ORAL DAILY
Qty: 90 TABLET | Refills: 0 | Status: SHIPPED | OUTPATIENT
Start: 2025-06-06

## 2025-06-06 RX ORDER — EZETIMIBE 10 MG/1
10 TABLET ORAL DAILY
Qty: 90 TABLET | Refills: 0 | Status: SHIPPED | OUTPATIENT
Start: 2025-06-06

## 2025-06-16 ENCOUNTER — APPOINTMENT (OUTPATIENT)
Dept: OPHTHALMOLOGY | Facility: CLINIC | Age: 60
End: 2025-06-16
Payer: COMMERCIAL

## 2025-06-20 ENCOUNTER — HOSPITAL ENCOUNTER (OUTPATIENT)
Dept: RADIATION ONCOLOGY | Facility: CLINIC | Age: 60
Setting detail: RADIATION/ONCOLOGY SERIES
Discharge: HOME | End: 2025-06-20
Payer: COMMERCIAL

## 2025-06-20 VITALS
HEART RATE: 71 BPM | WEIGHT: 215.1 LBS | BODY MASS INDEX: 38.1 KG/M2 | RESPIRATION RATE: 18 BRPM | SYSTOLIC BLOOD PRESSURE: 135 MMHG | TEMPERATURE: 98.4 F | OXYGEN SATURATION: 98 % | DIASTOLIC BLOOD PRESSURE: 73 MMHG

## 2025-06-20 DIAGNOSIS — Z85.42 ENCOUNTER FOR FOLLOW-UP SURVEILLANCE OF UTERINE CANCER: ICD-10-CM

## 2025-06-20 DIAGNOSIS — Z08 ENCOUNTER FOR FOLLOW-UP SURVEILLANCE OF UTERINE CANCER: ICD-10-CM

## 2025-06-20 PROCEDURE — 99213 OFFICE O/P EST LOW 20 MIN: CPT | Performed by: NURSE PRACTITIONER

## 2025-06-20 ASSESSMENT — PAIN SCALES - GENERAL: PAINLEVEL_OUTOF10: 0-NO PAIN

## 2025-06-20 NOTE — PROGRESS NOTES
"Radiation Oncology Follow-Up    Patient Name:  Heidi Quiroz  MRN:  [id number]  :  1965    Referring Provider: Nano Sultana, APR*  Primary Care Provider: Holden Hare DO  Care Team: Patient Care Team:  Holden Hare DO as PCP - General (Internal Medicine)  Anahy Eckert MD, MS as Consulting Physician (Obstetrics and Gynecology)  Ana Gerard MD MPH as Consulting Physician (Obstetrics and Gynecology)    Date of Service: 2025   60 y.o. female with Ductal carcinoma in situ (DCIS) of left breast, Clinical: cTis (DCIS), cM0, G3, ER-     Oncologic history:    - Routine screening mammogram on 2023 which revealed grouped calcifications in the upper outer quadrant of the left breast.     - Diagnostic views performed 2023 revealed indeterminate calcifications in the upper outer quadrant. Ultrasound directed at the 1 o'clock position, 10 cm from the nipple revealed a cyst.     - On 2024 she underwent a left breast core biopsy which revealed ductal carcinoma in situ, grade 3. Estrogen receptors were negative. She underwent a left partial mastectomy on 2/15/2024. Pathology revealed a 3.6 cm ductal carcinoma in situ of the cribriform, micropapillary and solid subtypes, nuclear grade 3. The resection margins were negative although DCIS was microns from the anterior margin and less than 1 mm from the lateral and posterior margins.     - She underwent a reexcision on 2024 which was negative. Referred for radiation.     - 4/3/24 - 24: Radiation to the left breast consisting of a dose of 42.56 Gy with additional 10 Gy to the tumor bed for a total of 52.56 Gy.     - 24: Laparoscopic hysterectomy     SUBJECTIVE  History of Present Illness:   Heidi Quiroz \"Chelita\" is here today for routine radiation follow up/surveillance visit.  She is doing well post treatment.  Endorses mild left breast tenderness and skin intact.  No swelling of left arm or difficulty with ROM.  Denies " headaches, fever, chills, cough, SOB, chest pain, n/v/c/d or bony pain.  ER- so no adjuvant endocrine therapy. Mammogram in October 2024 without evidence of malignancy.    Review of Systems:    Review of Systems   All other systems reviewed and are negative.    Performance Status:   The Karnofsky performance scale today is 100%   OBJECTIVE    Current Outpatient Medications:     acetaminophen (Tylenol) 325 mg tablet, Take 3 tablets (975 mg) by mouth every 6 hours if needed for mild pain (1 - 3) for up to 90 doses., Disp: 90 tablet, Rfl: 0    amoxicillin (Amoxil) 500 mg tablet, Take 4 tablets (2,000 mg) by mouth once daily. 1 hour prior to dental appointments, Disp: , Rfl:     cholecalciferol (Vitamin D-3) 50 mcg (2,000 unit) capsule, Take 1 capsule (50 mcg) by mouth early in the morning.., Disp: , Rfl:     ezetimibe (Zetia) 10 mg tablet, Take 1 tablet (10 mg) by mouth once daily., Disp: 90 tablet, Rfl: 0    mesalamine (Lialda) 1.2 gram EC tablet, TAKE 4 TABLETS BY MOUTH ONCE DAILY WITH THE EVENING MEAL., Disp: 360 tablet, Rfl: 2    propranolol LA (Inderal LA) 80 mg 24 hr capsule, TAKE 1 CAPSULE (80 MG) BY MOUTH ONCE DAILY. DO NOT CRUSH, CHEW, OR SPLIT., Disp: 90 capsule, Rfl: 1    rosuvastatin (Crestor) 10 mg tablet, Take 1 tablet (10 mg) by mouth once daily., Disp: 90 tablet, Rfl: 0    sodium,potassium,mag sulfates (Suprep Bowel Prep Kit) 17.5-3.13-1.6 gram solution, Take one bottle twice as directed by the prep instructions, Disp: 354 mL, Rfl: 0     Physical Exam  Vitals reviewed.   Constitutional:       Appearance: Normal appearance.   HENT:      Head: Normocephalic and atraumatic.      Nose: Nose normal.      Mouth/Throat:      Mouth: Mucous membranes are moist.      Pharynx: Oropharynx is clear.   Eyes:      Conjunctiva/sclera: Conjunctivae normal.      Pupils: Pupils are equal, round, and reactive to light.   Cardiovascular:      Heart sounds: Normal heart sounds.   Pulmonary:      Effort: Pulmonary effort is  normal.      Breath sounds: Normal breath sounds.   Chest:   Breasts:     Right: No swelling, inverted nipple, mass, nipple discharge or skin change.      Left: No swelling, inverted nipple, mass, nipple discharge or skin change.       Abdominal:      Palpations: Abdomen is soft.   Musculoskeletal:         General: No swelling. Normal range of motion.      Cervical back: Normal range of motion and neck supple.   Lymphadenopathy:      Cervical: No cervical adenopathy.      Upper Body:      Right upper body: No supraclavicular or axillary adenopathy.      Left upper body: No supraclavicular or axillary adenopathy.   Skin:     General: Skin is warm and dry.   Neurological:      General: No focal deficit present.      Mental Status: She is alert and oriented to person, place, and time.   Psychiatric:         Mood and Affect: Mood normal.         Behavior: Behavior normal.        Interpreted By:  Karri Don,   ADDENDUM:  There was a likely voice recognition error in the final sentence of  the indication.      It should read re-excision (at the left lumpectomy) rather than right  excision.      The remainder of the report is unchanged.      BI-RADS Category:  2 Benign.  Recommendation:  Annual Screening.  Recommended Date:  1 Year.  Laterality:  Bilateral.      Signed by: Karri Don 10/10/2024 5:03 PM      -------- ORIGINAL REPORT --------  Dictation workstation:   [id number]   Addended by Karri Don MD on 10/10/2024  5:03 PM     Study Result    Narrative & Impression   Interpreted By:  Karri Don and Liller Gregory   STUDY:  BI MAMMO BILATERAL DIAGNOSTIC TOMOSYNTHESIS;  10/10/2024 2:43 pm      ACCESSION NUMBER(S):  [id number]      ORDERING CLINICIAN:  NASIR QUINTERO      INDICATION:  Patient presents for 1 week bilateral diffuse breast pain. Left  lumpectomy with radiation. Right excisional biopsy.      ,Z85.3 Personal history of malignant neoplasm of breast      COMPARISON:  Mammogram 02/08/2024,  biopsy images 1/22/2024.      FINDINGS:  MAMMOGRAPHY: 2D and tomosynthesis images were reviewed at 1 mm slice  thickness.      Density:  There are scattered areas of fibroglandular density.      Postsurgical changes in the upper-outer quadrant of the left breast  with biopsy clips. No suspicious masses or calcifications are  identified in either breast.      IMPRESSION:  No mammographic evidence of malignancy.      BI-RADS CATEGORY:  BI-RADS Category:  2 Benign.  Recommendation:  Annual Screening.  Recommended Date:  1 Year.  Laterality:  Bilateral.      ASSESSMENT/PLAN:  60 y.o. female with DCIS left breast s/p breast conserving surgery followed by radiation.  FUV with Dr. Banda/surg onc team for mammograms.  Follow up with Dr. Eckert for endometrial cancer. Radiation follow up in 12 mo. Call with any questions or concerns.     Padmini Sultana CNP  [phone]

## 2025-07-14 ENCOUNTER — ANESTHESIA EVENT (OUTPATIENT)
Dept: GASTROENTEROLOGY | Facility: HOSPITAL | Age: 60
End: 2025-07-14
Payer: COMMERCIAL

## 2025-07-14 ENCOUNTER — ANESTHESIA (OUTPATIENT)
Dept: GASTROENTEROLOGY | Facility: HOSPITAL | Age: 60
End: 2025-07-14
Payer: COMMERCIAL

## 2025-07-14 ENCOUNTER — APPOINTMENT (OUTPATIENT)
Dept: PRIMARY CARE | Facility: CLINIC | Age: 60
End: 2025-07-14
Payer: COMMERCIAL

## 2025-07-14 ENCOUNTER — HOSPITAL ENCOUNTER (OUTPATIENT)
Dept: GASTROENTEROLOGY | Facility: HOSPITAL | Age: 60
Discharge: HOME | End: 2025-07-14
Payer: COMMERCIAL

## 2025-07-14 VITALS
BODY MASS INDEX: 37.03 KG/M2 | RESPIRATION RATE: 14 BRPM | SYSTOLIC BLOOD PRESSURE: 130 MMHG | WEIGHT: 209 LBS | OXYGEN SATURATION: 99 % | HEIGHT: 63 IN | HEART RATE: 58 BPM | DIASTOLIC BLOOD PRESSURE: 77 MMHG | TEMPERATURE: 97 F

## 2025-07-14 DIAGNOSIS — K51.00 ULCERATIVE PANCOLITIS WITHOUT COMPLICATION (MULTI): ICD-10-CM

## 2025-07-14 PROCEDURE — 3700000001 HC GENERAL ANESTHESIA TIME - INITIAL BASE CHARGE

## 2025-07-14 PROCEDURE — A45380 PR COLONOSCOPY,BIOPSY

## 2025-07-14 PROCEDURE — 7100000009 HC PHASE TWO TIME - INITIAL BASE CHARGE

## 2025-07-14 PROCEDURE — 3700000002 HC GENERAL ANESTHESIA TIME - EACH INCREMENTAL 1 MINUTE

## 2025-07-14 PROCEDURE — 7100000010 HC PHASE TWO TIME - EACH INCREMENTAL 1 MINUTE

## 2025-07-14 PROCEDURE — 2500000004 HC RX 250 GENERAL PHARMACY W/ HCPCS (ALT 636 FOR OP/ED)

## 2025-07-14 PROCEDURE — A45380 PR COLONOSCOPY,BIOPSY: Performed by: ANESTHESIOLOGY

## 2025-07-14 PROCEDURE — 45380 COLONOSCOPY AND BIOPSY: CPT | Performed by: INTERNAL MEDICINE

## 2025-07-14 RX ORDER — LIDOCAINE HCL/PF 100 MG/5ML
SYRINGE (ML) INTRAVENOUS AS NEEDED
Status: DISCONTINUED | OUTPATIENT
Start: 2025-07-14 | End: 2025-07-14

## 2025-07-14 RX ORDER — MIDAZOLAM HYDROCHLORIDE 2 MG/2ML
INJECTION, SOLUTION INTRAMUSCULAR; INTRAVENOUS AS NEEDED
Status: DISCONTINUED | OUTPATIENT
Start: 2025-07-14 | End: 2025-07-14

## 2025-07-14 RX ORDER — PROPOFOL 10 MG/ML
INJECTION, EMULSION INTRAVENOUS AS NEEDED
Status: DISCONTINUED | OUTPATIENT
Start: 2025-07-14 | End: 2025-07-14

## 2025-07-14 RX ORDER — GLYCOPYRROLATE 0.2 MG/ML
INJECTION INTRAMUSCULAR; INTRAVENOUS AS NEEDED
Status: DISCONTINUED | OUTPATIENT
Start: 2025-07-14 | End: 2025-07-14

## 2025-07-14 RX ORDER — SODIUM CHLORIDE, SODIUM LACTATE, POTASSIUM CHLORIDE, CALCIUM CHLORIDE 600; 310; 30; 20 MG/100ML; MG/100ML; MG/100ML; MG/100ML
INJECTION, SOLUTION INTRAVENOUS CONTINUOUS PRN
Status: DISCONTINUED | OUTPATIENT
Start: 2025-07-14 | End: 2025-07-14

## 2025-07-14 RX ORDER — ONDANSETRON HYDROCHLORIDE 2 MG/ML
INJECTION, SOLUTION INTRAVENOUS AS NEEDED
Status: DISCONTINUED | OUTPATIENT
Start: 2025-07-14 | End: 2025-07-14

## 2025-07-14 RX ADMIN — SODIUM CHLORIDE, POTASSIUM CHLORIDE, SODIUM LACTATE AND CALCIUM CHLORIDE: 600; 310; 30; 20 INJECTION, SOLUTION INTRAVENOUS at 12:50

## 2025-07-14 RX ADMIN — PROPOFOL 11.55 MG: 10 INJECTION, EMULSION INTRAVENOUS at 13:00

## 2025-07-14 RX ADMIN — PROPOFOL 150 MCG/KG/MIN: 10 INJECTION, EMULSION INTRAVENOUS at 12:56

## 2025-07-14 RX ADMIN — MIDAZOLAM HYDROCHLORIDE 2 MG: 2 INJECTION, SOLUTION INTRAMUSCULAR; INTRAVENOUS at 12:50

## 2025-07-14 RX ADMIN — ONDANSETRON 4 MG: 2 INJECTION INTRAMUSCULAR; INTRAVENOUS at 12:56

## 2025-07-14 RX ADMIN — GLYCOPYRROLATE 0.2 MG: 0.2 SOLUTION INTRAMUSCULAR; INTRAVENOUS at 12:59

## 2025-07-14 RX ADMIN — PROPOFOL 50 MG: 10 INJECTION, EMULSION INTRAVENOUS at 12:55

## 2025-07-14 RX ADMIN — LIDOCAINE HYDROCHLORIDE 100 MG: 20 INJECTION, SOLUTION INTRAVENOUS at 12:56

## 2025-07-14 SDOH — HEALTH STABILITY: MENTAL HEALTH: CURRENT SMOKER: 0

## 2025-07-14 ASSESSMENT — PAIN - FUNCTIONAL ASSESSMENT
PAIN_FUNCTIONAL_ASSESSMENT: 0-10

## 2025-07-14 ASSESSMENT — PAIN SCALES - GENERAL
PAINLEVEL_OUTOF10: 0 - NO PAIN
PAIN_LEVEL: 0
PAINLEVEL_OUTOF10: 0 - NO PAIN

## 2025-07-14 NOTE — DISCHARGE INSTRUCTIONS
During the first 24 hours after your procedure, you should:    - Resume normal diet, unless otherwise directed by your doctor.  - Resume your home medications, unless otherwise directed by your doctor.  - Refrain from driving or operative heavy machinery.  - Drink plenty of liquids.  - Avoid consuming alcohol.  - Avoid strenuous activity or heavy lifting.    After 24 hours, you can resume regular activity.    Call your doctor office immediately ([phone]) or come to the nearest emergency room if you experience:    - Abdominal tenderness  - Blood in your stool or vomit  - Difficulty urinating or passing stools  - Difficulty breathing  - Chest pain  - Fever

## 2025-07-14 NOTE — H&P
"Subjective     History of Present Illness:   Heidi Quiroz \"Chelita\" is a 60 y.o. female with ulcerative colitis, breast cancer, and endometrial caner who presented for surveillance colonoscopy.  She takes mesalamine 4.8 grams daily and appears to be in clinical remission.  She has no GI complaints.    Review of Systems  Constitutional: denies in acute distress  Cardiovascular: denies chest pain  Respiratory: denies shortness of breath  GI: see HPI  Neurologic: denies altered mental status  Dermatology: denies jaundice    Past Medical History   has a past medical history of Allergic (2 years old), Anemia, Arthritis (2014), Breast cancer (01/2024), Cholelithiasis, Chronic pain disorder, Depression (1997), Disease of thyroid gland (?), Ductal carcinoma in situ (DCIS) of left breast, Endometrial cancer (Multi), Essential (primary) hypertension, GERD (gastroesophageal reflux disease) (?), Hearing loss, Insomnia, Irritable bowel syndrome, Lung nodule, Nontoxic multinodular goiter, Obesity, PMB (postmenopausal bleeding), Pure hypercholesterolemia, unspecified, Shortness of breath (recently), Ulcerative colitis (2022), and Vitamin D deficiency, unspecified.     Social History   reports that she quit smoking about 30 years ago. Her smoking use included cigarettes. She started smoking about 37 years ago. She has a 3.5 pack-year smoking history. She has been exposed to tobacco smoke. She has never used smokeless tobacco. She reports current alcohol use. She reports that she does not use drugs.     Family History  family history includes Arthritis in her father and mother; Cancer in her brother, brother, father, and mother; Heart attack in her mother; Heart disease in her mother; Hyperlipidemia in her brother, brother, brother, father, and mother; Hypertension in her brother, brother, brother, brother, father, and mother; Liver cancer in her father; Lung cancer in her brother, brother, brother, and mother; Obesity in her " "brother, brother, father, and mother; psoriatic arthritis in her son.     Allergies  RX Allergies[1]    Medications  Current Outpatient Medications   Medication Instructions    acetaminophen (TYLENOL) 975 mg, oral, Every 6 hours PRN    amoxicillin (AMOXIL) 2,000 mg, Daily    cholecalciferol (Vitamin D-3) 50 mcg (2,000 unit) capsule 1 capsule, Daily    ezetimibe (ZETIA) 10 mg, oral, Daily    mesalamine (LIALDA) 4.8 g, oral, Daily with evening meal    propranolol LA (INDERAL LA) 80 mg, oral, Daily, Do not crush, chew, or split.    rosuvastatin (CRESTOR) 10 mg, oral, Daily    sodium,potassium,mag sulfates (Suprep Bowel Prep Kit) 17.5-3.13-1.6 gram solution Take one bottle twice as directed by the prep instructions        Objective   Visit Vitals  /76   Pulse 72   Temp 36 °C (96.8 °F) (Temporal)        Physical Exam  General: not in acute distress  CV: regular rate and rhythm  Resp: non-labored breathing  GI: soft, active bowel sounds, non-tender to palpation, no rebound or guarding  Msk: moving all extremities   Derm: no jaundice    Labs  Lab Results   Component Value Date    WBC 6.1 02/10/2025    HGB 14.1 02/10/2025    HCT 43.7 02/10/2025    MCV 85.0 02/10/2025     02/10/2025     Lab Results   Component Value Date    GLUCOSE 95 02/10/2025    CALCIUM 9.0 02/10/2025     02/10/2025    K 4.1 02/10/2025    CO2 27 02/10/2025     02/10/2025    BUN 14 02/10/2025    CREATININE 0.82 02/10/2025     Lab Results   Component Value Date    ALT 17 02/10/2025    AST 18 02/10/2025    ALKPHOS 77 02/10/2025    BILITOT 0.5 02/10/2025     No results found for: \"INR\", \"PROTIME\"    Assessment/Plan   Heidi Quiroz \"Chelita\" is a 60 y.o. female with ulcerative colitis, breast cancer, and endometrial caner who presented for surveillance colonoscopy.   Stable to proceed with planned procedure.      Modesto Tena MD       [1] No Known Allergies    "

## 2025-07-14 NOTE — ANESTHESIA PREPROCEDURE EVALUATION
"Patient: Heidi Quiroz \"Chelita\"    Procedure Information       Date/Time: 07/14/25 1300    Scheduled providers: Modesto Tena MD; Anabella Avalos RN    Procedure: COLONOSCOPY    Location: [facility]            Relevant Problems   Cardiac   (+) Hypertension   (+) Pure hypercholesterolemia      Neuro   (+) Adult situational stress disorder   (+) Sciatica of left side      GI   (+) Ulcerative colitis      Endocrine   (+) Class 2 obesity with alveolar hypoventilation and body mass index (BMI) of 35.0 to 35.9 in adult   (+) Hyperthyroidism   (+) Multinodular thyroid      Hematology   (+) Anemia      HEENT   (+) Bilateral sensorineural hearing loss      GYN   (+) Endometrial cancer determined by uterine biopsy (Multi)       Clinical information reviewed:    Allergies  Meds     OB Status           NPO Detail:  NPO/Void Status  Carbohydrate Drink Given Prior to Surgery? : N  Date of Last Liquid: 07/14/25  Time of Last Liquid: (S) 0830  Date of Last Solid: 07/13/25  Time of Last Solid: 1130  Last Intake Type: Clear fluids  Time of Last Void: 1000         Physical Exam    Airway  Mallampati: II  TM distance: >3 FB  Neck ROM: full     Cardiovascular - normal exam  Rhythm: regular  Rate: normal     Dental        Pulmonary - normal examBreath sounds clear to auscultation     Abdominal (+) obese             Anesthesia Plan    History of general anesthesia?: yes  History of complications of general anesthesia?: no    ASA 3     MAC   (    Risks discussed to Patient's satisfaction  )  The patient is not a current smoker.  Education provided regarding risk of obstructive sleep apnea.  intravenous induction   Anesthetic plan and risks discussed with patient.    Plan discussed with CRNA and CAA.      "

## 2025-07-14 NOTE — ANESTHESIA POSTPROCEDURE EVALUATION
"Patient: Heidi Quiroz \"Chelita\"    Procedure Summary       Date: 07/14/25 Room / Location: [facility]    Anesthesia Start: 1250 Anesthesia Stop: 1345    Procedure: COLONOSCOPY Diagnosis: Ulcerative pancolitis without complication (Multi)    Scheduled Providers: Modesto Tena MD; Anabella Avalos RN Responsible Provider: Marco Mckay MD    Anesthesia Type: MAC ASA Status: 3            Anesthesia Type: MAC    Vitals Value Taken Time   /77 07/14/25 14:14   Temp 36.1 °C (97 °F) 07/14/25 13:42   Pulse 58 07/14/25 14:14   Resp 14 07/14/25 14:14   SpO2 99 % 07/14/25 14:14       Anesthesia Post Evaluation    Patient location during evaluation: PACU  Patient participation: complete - patient participated  Level of consciousness: awake and alert  Pain score: 0  Pain management: adequate  Airway patency: patent  Cardiovascular status: acceptable  Respiratory status: acceptable  Hydration status: acceptable  Postoperative Nausea and Vomiting: none  Comments: Pt. Seen and examined.  No apparent anesthetic complications.          There were no known notable events for this encounter.    "

## 2025-07-21 ENCOUNTER — APPOINTMENT (OUTPATIENT)
Dept: PRIMARY CARE | Facility: CLINIC | Age: 60
End: 2025-07-21
Payer: COMMERCIAL

## 2025-07-21 VITALS
DIASTOLIC BLOOD PRESSURE: 82 MMHG | BODY MASS INDEX: 37.21 KG/M2 | HEART RATE: 63 BPM | WEIGHT: 210 LBS | SYSTOLIC BLOOD PRESSURE: 139 MMHG | HEIGHT: 63 IN | OXYGEN SATURATION: 96 %

## 2025-07-21 DIAGNOSIS — Z00.00 HEALTH CARE MAINTENANCE: Primary | ICD-10-CM

## 2025-07-21 DIAGNOSIS — C54.1 ENDOMETRIAL CANCER DETERMINED BY UTERINE BIOPSY (MULTI): ICD-10-CM

## 2025-07-21 DIAGNOSIS — E05.90 HYPERTHYROIDISM: ICD-10-CM

## 2025-07-21 DIAGNOSIS — I10 PRIMARY HYPERTENSION: ICD-10-CM

## 2025-07-21 DIAGNOSIS — D05.12 DUCTAL CARCINOMA IN SITU (DCIS) OF LEFT BREAST: ICD-10-CM

## 2025-07-21 DIAGNOSIS — C79.82 SECONDARY MALIGNANT NEOPLASM OF GENITAL ORGANS (MULTI): ICD-10-CM

## 2025-07-21 PROBLEM — R03.0 ELEVATED BLOOD PRESSURE READING WITHOUT DIAGNOSIS OF HYPERTENSION: Status: RESOLVED | Noted: 2024-01-19 | Resolved: 2025-07-21

## 2025-07-21 LAB
LABORATORY COMMENT REPORT: NORMAL
PATH REPORT.FINAL DX SPEC: NORMAL
PATH REPORT.GROSS SPEC: NORMAL
PATH REPORT.TOTAL CANCER: NORMAL

## 2025-07-21 PROCEDURE — 3075F SYST BP GE 130 - 139MM HG: CPT | Performed by: INTERNAL MEDICINE

## 2025-07-21 PROCEDURE — 3079F DIAST BP 80-89 MM HG: CPT | Performed by: INTERNAL MEDICINE

## 2025-07-21 PROCEDURE — 3008F BODY MASS INDEX DOCD: CPT | Performed by: INTERNAL MEDICINE

## 2025-07-21 PROCEDURE — 99396 PREV VISIT EST AGE 40-64: CPT | Performed by: INTERNAL MEDICINE

## 2025-07-21 RX ORDER — FUROSEMIDE 20 MG/1
20 TABLET ORAL DAILY
Qty: 90 TABLET | Refills: 3 | Status: SHIPPED | OUTPATIENT
Start: 2025-07-21 | End: 2026-07-21

## 2025-07-21 NOTE — PATIENT INSTRUCTIONS
We kindly ask that you take the lead and scheduling your referral appointments to ensure they align best with your availability by calling [phone].  For laboratory tests we encourage you to schedule an appointment online [email or web address] but walk-ins are available as well.  For radiology testing you can call [phone] or [phone] to schedule.  If for any reason you are having difficulty scheduling your appointments please feel free to reach out at our office by calling [phone] to assist further

## 2025-07-21 NOTE — ASSESSMENT & PLAN NOTE
Needs better blood pressure control.  Start Lasix follow-up annually for blood pressure monitoring.

## 2025-07-21 NOTE — ASSESSMENT & PLAN NOTE
Status post lumpectomy and radiation therapy.  Continue following with gynecology for surveillance

## 2025-07-21 NOTE — PROGRESS NOTES
"Subjective   Patient ID: Heidi Quiroz \"Chelita\" is a 60 y.o. female who presents for Annual Exam.        HPI patient is a 60-year-old female with past medical history of multinodular goiter breast cancer status postlumpectomy and radiation therapy, endometrial cancer status post total abdominal hysterectomy with bilateral salpingo-oophorectomy ulcerative pancolitis hypertension hypothyroidism obesity who presents for wellness exam.  No specific complaints at this time.  Overall doing well.  Has been following with gynecology for breast cancer surveillance.  She had a cancer next expanded test completed which was negative.    Labs are up-to-date.  Denies any complaints.  She lives at home on her own.  Not currently sexually active.  No concern for STDs.  Up-to-date on colonoscopy.  Following with gastroenterology and currently on Lialda for treatment of proctitis    Deferring all vaccines.  Up-to-date on laboratory testing.    Review of Systems  Constitutional: No fever or chills, No Night Sweats  Eyes: No Blurry Vision or Eye sight problems  ENT: No Nasal Discharge, Hoarseness, sore throat  Cardiovascular: no chest pain, no palpitations and no syncope.   Respiratory: no cough, no shortness of breath during exertion and no shortness of breath at rest.   Gastrointestinal: no abdominal pain, no nausea and no vomiting.   : No vaginal discharge, burning with urination, no blood in urine or stools  Skin: No Skin rashes or Lesions  Neuro: No Headache, no dizziness or Numbness or tingling  Psych: No Anxiety, depression or sleeping problems  Heme: No Easy bleeding or brusing.     Objective   /82   Pulse 63   Ht 1.6 m (5' 3\")   Wt 95.3 kg (210 lb)   LMP  (LMP Unknown)   SpO2 96%   BMI 37.20 kg/m²     Physical Exam  Constitutional: Alert and in no acute distress. Well developed, well nourished.   Head and Face: Head and face: Normal.    Eyes: Normal external exam. Pupils were equal in size, round, reactive to " light (PERRL) with normal accommodation and extraocular movements intact (EOMI).   Ears, Nose, Mouth, and Throat: External inspection of ears and nose: Normal.  Hearing: Normal.  Nasal mucosa, septum, and turbinates: Normal.  Lips, teeth, and gums: Normal.  Oropharynx: Normal.   Neck: No neck mass was observed. Supple. Thyroid not enlarged and there were no palpable thyroid nodules.   Cardiovascular: Heart rate and rhythm were normal, normal S1 and S2. Pedal pulses: Normal. No peripheral edema.   Pulmonary: No respiratory distress. Clear bilateral breath sounds.   Breast: Normal Appearance, No Masses or lumps palpated  Abdomen: Soft nontender; no abdominal mass palpated. Normal bowel sounds. No organomegaly.   : Deferred   Musculoskeletal: No joint swelling seen, normal movements of all extremities. Range of motion: Normal.  Muscle strength/tone: Normal.    Skin: Normal skin color and pigmentation, normal skin turgor, and no rash.   Neurologic: Deep tendon reflexes were 2+ and symmetric.   Psychiatric: Judgment and insight: Intact. Mood and affect: Normal.  Lymphatic: No cervical lymphadenopathy. Palpation of lymph nodes in axillae: Normal.  Palpation of lymph nodes in groin: Normal.    Lab Results   Component Value Date    WBC 6.1 02/10/2025    HGB 14.1 02/10/2025    HCT 43.7 02/10/2025     02/10/2025    CHOL 158 02/10/2025    TRIG 209 (H) 02/10/2025    HDL 53 02/10/2025    ALT 17 02/10/2025    AST 18 02/10/2025     02/10/2025    K 4.1 02/10/2025     02/10/2025    CREATININE 0.82 02/10/2025    BUN 14 02/10/2025    CO2 27 02/10/2025    TSH 0.44 02/10/2025    HGBA1C 5.4 02/08/2021       Colonoscopy Screening; High Risk Patient; UC  Table formatting from the original result was not included.  Impression  The terminal ileum and entire colon appeared normal.  Performed forceps biopsies in the cecum, ascending colon, transverse   colon, descending colon and sigmoid colon for dysplasia  screening  Internal small hemorrhoids    Findings  The terminal ileum and entire colon appeared normal.Consistent with   quiescent IBDMayo score 0  Performed multiple random forceps biopsies in the cecum, ascending colon,   transverse colon, descending colon and sigmoid colon for dysplasia   screening. Random biopsy obtained from the right, transverse, and left   colon  Internal small (grade 1) hemorrhoids observed during retroflexion; no   bleeding was observed       Recommendation  Await pathology results   Repeat colonoscopy in 3 years, due: 7/13/2028   Follow up with me in clinic, due: 2/11/2026     Indication  Ulcerative pancolitis without complication (Multi)    Post-Op Diagnosis  Grade I hemorrhoids    Staff  Staff Role   Modesto Tena MD Proceduralist     Medications  See Anesthesia Record.     Preprocedure  A history and physical has been performed, and patient medication   allergies have been reviewed. The patient's tolerance of previous   anesthesia has been reviewed. The risks and benefits of the procedure and   the sedation options and risks were discussed with the patient. All   questions were answered and informed consent obtained.    Details of the Procedure  The patient underwent monitored anesthesia care, which was administered by   an anesthesia professional. The patient's blood pressure, ECG, ETCO2,   heart rate, level of consciousness, oxygen and respirations were monitored   throughout the procedure. A digital rectal exam was performed. The scope   was introduced through the anus and advanced to the terminal ileum.   Retroflexion was performed in the rectum. The quality of bowel preparation   was evaluated using the [place] Bowel Preparation Scale with scores of:   right colon = 2, transverse colon = 2, left colon = 3. The total BBPS   score was 7. Bowel prep was adequate. The patient's estimated blood loss   was minimal. The procedure was not difficult. The patient tolerated the   procedure  well. There were no apparent adverse events.     Events  Procedure Events   Event Event Time   ENDO SCOPE IN TIME 7/14/2025  1:03 PM   ENDO CECUM REACHED 7/14/2025  1:21 PM   ENDO SCOPE OUT TIME 7/14/2025  1:35 PM     Specimens  ID Type Source Tests Collected by Time   1 : Random right colon cold Bx's Tissue ASCENDING COLON BIOPSY SURGICAL   PATHOLOGY EXAM Modesto Tena MD 7/14/2025 1324   2 : Random cold Bx's Tissue COLON - TRANSVERSE BIOPSY SURGICAL PATHOLOGY   EXAM Modesto Tena MD 7/14/2025 1327   3 : Random Left colon cold Bx's Tissue COLON - DESCENDING BIOPSY SURGICAL   PATHOLOGY EXAM Modesto Tena MD 7/14/2025 1330     Procedure Location  [facility]  [address] [facility] [address] [phone]    Referring Provider  Zakiya Morrison, APRN-CNP    Procedure Provider  Modesto Tena MD      Assessment/Plan   Problem List Items Addressed This Visit           ICD-10-CM    Primary hypertension I10    Needs better blood pressure control.  Start Lasix follow-up annually for blood pressure monitoring.         Relevant Medications    furosemide (Lasix) 20 mg tablet    Hyperthyroidism E05.90    Clinically euthyroid and TSH within normal range         Ductal carcinoma in situ (DCIS) of left breast D05.12    Status post lumpectomy and radiation therapy.  Continue following with gynecology for surveillance         Endometrial cancer determined by uterine biopsy (Multi) C54.1    Status post ALFONZO with salpingectomy  Continue following tolerating collagen for surveillance         Secondary malignant neoplasm of genital organs (Multi) C79.82     Other Visit Diagnoses         Codes      Health care maintenance    -  Primary Z00.00              Dear Heidi Quiroz     It was my pleasure to take care of you today in the office. Below are the things we discussed today:    1. 1. Immunizations: Yearly Flu shot is recommended.       Deferring    2. Blood Work: Up-to-date  3. Seen your  dentist twice a year  4. Yearly Eye exam is recommended    5. BMI: 37.2  6: Diet recommendations:   Eat Clean, Try to have as many home cooked meals as possible  Avoid processed foods which contain excess calories, sugar, and sodium.    7. Exercise recommendations:   150 minutes a week to maintain your weight     If you have to loose weight, you need a better diet and exercise plan.     8. Supplements recommended:  a - Calcium 600 mg up to twice a day to get a total of 1200 mg. Each 8 oz of milk or yogurt or 1 oz of cheese, 1 Banana, 1 serving of green Leafy vegetable has about 300 mg of Calcium, so you may subtract that amount. Calcium citrate is the only acceptable supplement to take if you take an acid suppressing medication like Prilosec; otherwise Calcium carbonate is acceptable too (It can cause Constipation).   b - Vitamin D - 2000 IU daily     9. Please get your Living will / Advance directive completed if you do not have one already. Please make sure our office has a copy of the latest one.     10. Colonoscopy: Uptodate  11. Mammogram: Uptodate,   12. PAP: N/A  13. DEXA: N/A  14: Skin Check: Please see Dermatology once a year for a Skin Check.     15.  Follow-up annually    Follow up in one year for a Physical. Please call the office before your Physical to see if you need blood work completed prior to your physical.     Please call me if any questions arise from now until your next visit. I will call you after I am done seeing patients. A Doctor is always available by phone when the office is closed. Please feel free to call for help with any problem that you feel shouldn't wait until the office re-opens.

## 2025-07-31 ENCOUNTER — TELEPHONE (OUTPATIENT)
Facility: CLINIC | Age: 60
End: 2025-07-31
Payer: COMMERCIAL

## 2025-07-31 NOTE — TELEPHONE ENCOUNTER
Pt wanted confirmation regarding if annual is still needed regarding recent surgery.    Patient advised it will be okay to leave detailed message as well.    Please advise.

## 2025-07-31 NOTE — TELEPHONE ENCOUNTER
I reviewed her chart.  She had a hysterectomy for endometrial cancer May 2024, and recently had follow-up June 2025 with the GYN oncologist.  I agree, likely no additional visit is needed this year with me.  She may want to resume her annual GYN visits next year (or when she discontinues follow-up with GYN oncology).

## 2025-08-01 NOTE — TELEPHONE ENCOUNTER
Called pt, no answer, left voicemail for return call  Left detailed message as patient stated it was okay  Gave information from

## 2025-08-13 DIAGNOSIS — I10 PRIMARY HYPERTENSION: ICD-10-CM

## 2025-08-13 RX ORDER — FUROSEMIDE 20 MG/1
20 TABLET ORAL DAILY
Qty: 90 TABLET | Refills: 3 | Status: SHIPPED | OUTPATIENT
Start: 2025-08-13 | End: 2026-08-13

## 2025-09-01 DIAGNOSIS — E78.5 DYSLIPIDEMIA: ICD-10-CM

## 2025-09-01 DIAGNOSIS — I10 PRIMARY HYPERTENSION: ICD-10-CM

## 2025-09-02 RX ORDER — EZETIMIBE 10 MG/1
10 TABLET ORAL DAILY
Qty: 90 TABLET | Refills: 2 | Status: SHIPPED | OUTPATIENT
Start: 2025-09-02

## 2025-09-02 RX ORDER — ROSUVASTATIN CALCIUM 10 MG/1
10 TABLET, COATED ORAL DAILY
Qty: 90 TABLET | Refills: 2 | Status: SHIPPED | OUTPATIENT
Start: 2025-09-02

## 2025-09-05 ENCOUNTER — TELEPHONE (OUTPATIENT)
Dept: GASTROENTEROLOGY | Facility: HOSPITAL | Age: 60
End: 2025-09-05
Payer: COMMERCIAL

## 2025-10-02 ENCOUNTER — APPOINTMENT (OUTPATIENT)
Dept: GYNECOLOGIC ONCOLOGY | Facility: CLINIC | Age: 60
End: 2025-10-02
Payer: COMMERCIAL

## 2025-10-16 ENCOUNTER — APPOINTMENT (OUTPATIENT)
Dept: GYNECOLOGIC ONCOLOGY | Facility: CLINIC | Age: 60
End: 2025-10-16
Payer: COMMERCIAL

## 2025-11-03 ENCOUNTER — APPOINTMENT (OUTPATIENT)
Dept: OBSTETRICS AND GYNECOLOGY | Facility: CLINIC | Age: 60
End: 2025-11-03
Payer: COMMERCIAL

## 2025-12-01 ENCOUNTER — APPOINTMENT (OUTPATIENT)
Dept: OPHTHALMOLOGY | Facility: CLINIC | Age: 60
End: 2025-12-01
Payer: COMMERCIAL

## 2026-04-20 ENCOUNTER — APPOINTMENT (OUTPATIENT)
Dept: OPHTHALMOLOGY | Facility: CLINIC | Age: 61
End: 2026-04-20
Payer: COMMERCIAL

## (undated) DEVICE — SYRINGE, 60 CC, LUER LOCK, MONOJECT, W/O CAP, LF

## (undated) DEVICE — SUTURE, MONOCRYL, 4-0, 18 IN, PS2, UNDYED

## (undated) DEVICE — SUTURE, ETHILON, 3-0, 18 IN, PS1, BLACK

## (undated) DEVICE — TUBE SET, PNEUMOCLEAR, SMOKE EVACU, HIGH-FLOW

## (undated) DEVICE — KIT, MARGINMARKER, 6 INK COLORS, STANDARD

## (undated) DEVICE — SUTURE, VICRYL, 0, 27 IN, UR-6, VIOLET

## (undated) DEVICE — DRESSING, TRANSPARENT, TEGADERM, 4 X 4-3/4 IN

## (undated) DEVICE — MANIFOLD, 4 PORT NEPTUNE STANDARD

## (undated) DEVICE — HOLSTER, JET SAFETY

## (undated) DEVICE — TROCAR SYSTEM, BALLOON, KII GELPORT, 12 X 100MM

## (undated) DEVICE — DRESSING, DRAIN SPONGE, EXCILON AMD, 4X4 IN, 6 PLY, ST

## (undated) DEVICE — PACK, UNIVERSAL

## (undated) DEVICE — SYRINGE, W/CANNULA, VIAL ACCESS, INTERLINK, W/NEEDLE, 15 G

## (undated) DEVICE — CLIP, LIGATING, W/ADHESIVE PAD, MEDIUM, TITANIUM

## (undated) DEVICE — PREP TRAY, SKIN, DRY, W/GLOVES

## (undated) DEVICE — DRESSING, GAUZE, SPONGE, 12 PLY, CURITY, 4 X 4 IN, STERILE

## (undated) DEVICE — SUTURE, VICRYL, 3-0, 27 IN, SH

## (undated) DEVICE — COVER, CART, 45 X 27 X 48 IN, CLEAR

## (undated) DEVICE — Device

## (undated) DEVICE — COVER, MAYO STAND, W/PAD, 23 IN, DISPOSABLE, PLASTIC, LF, STERILE

## (undated) DEVICE — SLEEVE, VASO PRESS, CALF GARMENT, MEDIUM, GREEN

## (undated) DEVICE — STRIP, SKIN CLOSURE, STERI STRIP, REINFORCED, 1/2 X 2 IN

## (undated) DEVICE — LIGASURE, V SEALER/DIVIDER  5MM BLUNT TIP

## (undated) DEVICE — SUTURE, SILK, 2-0, 30 IN, SH, BLACK

## (undated) DEVICE — SYRINGE, LUER LOCK, 12ML

## (undated) DEVICE — SPONGE, LAP, XRAY DECT, 18IN X 18IN, W/MASTER DMT, STERILE

## (undated) DEVICE — SUTURE, MONOCRYL, 4-0, 27 IN, PS-2, UNDYED

## (undated) DEVICE — OCCLUDER, COLPO-PNEUMO

## (undated) DEVICE — DRAPE, PAD, PREP, W/ 9 IN CUFF, 24 X 41, LF, NS

## (undated) DEVICE — SYSTEM, FIOS FIRST ENTRY, 5 X 100MM, KII ADVANCED FIXATION

## (undated) DEVICE — ELECTRODE, OPTI2 LAPAROSCOPIC SPATULA, CURVED

## (undated) DEVICE — SUTURE, VICRYL, 2-0, 27 IN, SH, UNDYED

## (undated) DEVICE — TUBE, SALEM SUMP, 16 FR X 48IN, ENFIT

## (undated) DEVICE — STRIP, SKIN CLOSURE, STERI STRIP, REINFORCED, 0.5 X 4 IN

## (undated) DEVICE — DRESSING, TRANSPARENT, TEGADERM, 4 X 4-3/4 IN, NO LABEL

## (undated) DEVICE — CAUTERY, PENCIL, PUSH BUTTON, SMOKE EVAC, 70MM

## (undated) DEVICE — POSITIONING, THE PINK PAD, PIGAZZI SYSTEM

## (undated) DEVICE — DRESSING, ABDOMINAL PAD, CURITY, 7.5 X 8 IN

## (undated) DEVICE — RETRACTOR, HANDHELD, 250ML, DBL ENDED

## (undated) DEVICE — SYRINGE, 35 CC, LUER LOCK, MONOJECT, W/O CAP, LF

## (undated) DEVICE — TOWEL, SURGICAL, NEURO, O/R, 16 X 26, BLUE, STERILE

## (undated) DEVICE — PROBE COVER, INTRAOPERATIVE, 13 X 244CM (5 X 96IN)

## (undated) DEVICE — DRAPE, TIBURON W/ADHESIVE, 19 X 30

## (undated) DEVICE — RETRACTOR, CERVICAL CUP, VCARE, STANDARD

## (undated) DEVICE — BANDAGE, ELASTIC, ACE, ACE, DOUBLE LENGTH, 6 X 550 IN, LF

## (undated) DEVICE — GOWN, SURGICAL, SMARTGOWN, XLARGE, STERILE

## (undated) DEVICE — ELECTRODE, ELECTROSURGICAL, BLADE, INSULATED, ENT/IMA, STERILE

## (undated) DEVICE — CLIP, LIGATING, LIGACLIP EXTRA, MEDIUM, TITANIUM, WHITE

## (undated) DEVICE — REST, HEAD, BAGEL, 9 IN

## (undated) DEVICE — PUMP, STRYKERFLOW 2 & HANDPIECE W/10FT. IRRIGATION TUBING

## (undated) DEVICE — DRESSING, TRANSPARENT, TEGADERM, 4 X 4.5

## (undated) DEVICE — DRESSING, ADHESIVE, ISLAND, TELFA, 2 X 3.75 IN, LF

## (undated) DEVICE — GLOVE, SURGICAL, PROTEXIS PI BLUE W/NEUTHERA, 6.0, PF, LF

## (undated) DEVICE — SUTURE, VICRYL, 4-0, 18 IN, UNDYED BR PS-2

## (undated) DEVICE — GLOVE, SURGICAL, PROTEXIS PI , 6.0, PF, LF